# Patient Record
Sex: FEMALE | Race: BLACK OR AFRICAN AMERICAN | Employment: FULL TIME | URBAN - METROPOLITAN AREA
[De-identification: names, ages, dates, MRNs, and addresses within clinical notes are randomized per-mention and may not be internally consistent; named-entity substitution may affect disease eponyms.]

---

## 2017-05-05 ENCOUNTER — TELEPHONE (OUTPATIENT)
Dept: FAMILY MEDICINE CLINIC | Age: 57
End: 2017-05-05

## 2017-05-05 NOTE — TELEPHONE ENCOUNTER
Pt has an appointment with Dr. Sanya Rosas ([phone]) on 5/8/17 at 2:15 pm and wants to know if we can do a referral for her to [address] [place] [address] for menopausal issues.

## 2017-05-05 NOTE — TELEPHONE ENCOUNTER
Pt contacted office regarding referral. Pt states that she needs a referral for gynecology. After reviewing Pt chart I informed her that she hadn't been seen since 2014 and would need an appointment. She still wanted the call routed to Dr. Angel Anand. Pt states that she sees Dr. Sarah Euceda at [facility]. Dr. Angel Anand please advise.

## 2017-05-18 ENCOUNTER — OFFICE VISIT (OUTPATIENT)
Dept: FAMILY MEDICINE CLINIC | Age: 57
End: 2017-05-18

## 2017-05-18 ENCOUNTER — HOSPITAL ENCOUNTER (OUTPATIENT)
Dept: LAB | Age: 57
Discharge: HOME | End: 2017-05-18
Payer: OTHER GOVERNMENT

## 2017-05-18 VITALS
OXYGEN SATURATION: 98 % | RESPIRATION RATE: 18 BRPM | BODY MASS INDEX: 29.08 KG/M2 | HEART RATE: 61 BPM | SYSTOLIC BLOOD PRESSURE: 109 MMHG | WEIGHT: 158 LBS | TEMPERATURE: 97.1 F | HEIGHT: 62 IN | DIASTOLIC BLOOD PRESSURE: 66 MMHG

## 2017-05-18 DIAGNOSIS — G47.00 INSOMNIA, UNSPECIFIED TYPE: ICD-10-CM

## 2017-05-18 DIAGNOSIS — Z98.84 S/P GASTRIC BYPASS: ICD-10-CM

## 2017-05-18 DIAGNOSIS — N95.9 MENOPAUSAL AND PERIMENOPAUSAL DISORDER: Primary | ICD-10-CM

## 2017-05-18 DIAGNOSIS — F43.81 PROLONGED GRIEF REACTION: ICD-10-CM

## 2017-05-18 DIAGNOSIS — I10 UNSPECIFIED ESSENTIAL HYPERTENSION: ICD-10-CM

## 2017-05-18 DIAGNOSIS — E78.5 HYPERLIPIDEMIA, UNSPECIFIED HYPERLIPIDEMIA TYPE: ICD-10-CM

## 2017-05-18 DIAGNOSIS — F32.A DEPRESSION, UNSPECIFIED DEPRESSION TYPE: ICD-10-CM

## 2017-05-18 LAB
ALBUMIN SERPL BCP-MCNC: 4 G/DL (ref 3.4–5)
ALBUMIN/GLOB SERPL: 1.1 {RATIO} (ref 0.8–1.7)
ALP SERPL-CCNC: 72 U/L (ref 45–117)
ALT SERPL-CCNC: 14 U/L (ref 13–56)
ANION GAP BLD CALC-SCNC: 9 MMOL/L (ref 3–18)
AST SERPL W P-5'-P-CCNC: 22 U/L (ref 15–37)
BASOPHILS # BLD AUTO: 0 K/UL (ref 0–0.06)
BASOPHILS # BLD: 0 % (ref 0–2)
BILIRUB SERPL-MCNC: 0.4 MG/DL (ref 0.2–1)
BUN SERPL-MCNC: 15 MG/DL (ref 7–18)
BUN/CREAT SERPL: 23 (ref 12–20)
CALCIUM SERPL-MCNC: 9.7 MG/DL (ref 8.5–10.1)
CHLORIDE SERPL-SCNC: 98 MMOL/L (ref 100–108)
CHOLEST SERPL-MCNC: 253 MG/DL
CO2 SERPL-SCNC: 32 MMOL/L (ref 21–32)
CREAT SERPL-MCNC: 0.65 MG/DL (ref 0.6–1.3)
DIFFERENTIAL METHOD BLD: ABNORMAL
EOSINOPHIL # BLD: 0.1 K/UL (ref 0–0.4)
EOSINOPHIL NFR BLD: 1 % (ref 0–5)
ERYTHROCYTE [DISTWIDTH] IN BLOOD BY AUTOMATED COUNT: 15.2 % (ref 11.6–14.5)
FOLATE SERPL-MCNC: >20 NG/ML (ref 3.1–17.5)
GLOBULIN SER CALC-MCNC: 3.5 G/DL (ref 2–4)
GLUCOSE SERPL-MCNC: 91 MG/DL (ref 74–99)
HCT VFR BLD AUTO: 37.2 % (ref 35–45)
HDLC SERPL-MCNC: 132 MG/DL (ref 40–60)
HDLC SERPL: 1.9 {RATIO} (ref 0–5)
HGB BLD-MCNC: 12 G/DL (ref 12–16)
IRON SATN MFR SERPL: 24 %
IRON SERPL-MCNC: 109 UG/DL (ref 50–175)
LDLC SERPL CALC-MCNC: 106.8 MG/DL (ref 0–100)
LIPID PROFILE,FLP: ABNORMAL
LYMPHOCYTES # BLD AUTO: 47 % (ref 21–52)
LYMPHOCYTES # BLD: 2.1 K/UL (ref 0.9–3.6)
MCH RBC QN AUTO: 29.7 PG (ref 24–34)
MCHC RBC AUTO-ENTMCNC: 32.3 G/DL (ref 31–37)
MCV RBC AUTO: 92.1 FL (ref 74–97)
MONOCYTES # BLD: 0.3 K/UL (ref 0.05–1.2)
MONOCYTES NFR BLD AUTO: 7 % (ref 3–10)
NEUTS SEG # BLD: 2.1 K/UL (ref 1.8–8)
NEUTS SEG NFR BLD AUTO: 45 % (ref 40–73)
PLATELET # BLD AUTO: 272 K/UL (ref 135–420)
PMV BLD AUTO: 11.5 FL (ref 9.2–11.8)
POTASSIUM SERPL-SCNC: 4.3 MMOL/L (ref 3.5–5.5)
PROT SERPL-MCNC: 7.5 G/DL (ref 6.4–8.2)
RBC # BLD AUTO: 4.04 M/UL (ref 4.2–5.3)
SODIUM SERPL-SCNC: 139 MMOL/L (ref 136–145)
TIBC SERPL-MCNC: 458 UG/DL (ref 250–450)
TRIGL SERPL-MCNC: 71 MG/DL (ref ?–150)
TSH SERPL DL<=0.05 MIU/L-ACNC: 1.1 UIU/ML (ref 0.36–3.74)
VIT B12 SERPL-MCNC: >2000 PG/ML (ref 211–911)
VLDLC SERPL CALC-MCNC: 14.2 MG/DL
WBC # BLD AUTO: 4.7 K/UL (ref 4.6–13.2)

## 2017-05-18 PROCEDURE — 83540 ASSAY OF IRON: CPT | Performed by: FAMILY MEDICINE

## 2017-05-18 PROCEDURE — 82607 VITAMIN B-12: CPT | Performed by: FAMILY MEDICINE

## 2017-05-18 PROCEDURE — 80061 LIPID PANEL: CPT | Performed by: FAMILY MEDICINE

## 2017-05-18 PROCEDURE — 85025 COMPLETE CBC W/AUTO DIFF WBC: CPT | Performed by: FAMILY MEDICINE

## 2017-05-18 PROCEDURE — 80053 COMPREHEN METABOLIC PANEL: CPT | Performed by: FAMILY MEDICINE

## 2017-05-18 PROCEDURE — 84443 ASSAY THYROID STIM HORMONE: CPT | Performed by: FAMILY MEDICINE

## 2017-05-18 PROCEDURE — 36415 COLL VENOUS BLD VENIPUNCTURE: CPT | Performed by: FAMILY MEDICINE

## 2017-05-18 PROCEDURE — 84425 ASSAY OF VITAMIN B-1: CPT | Performed by: FAMILY MEDICINE

## 2017-05-18 PROCEDURE — 82306 VITAMIN D 25 HYDROXY: CPT | Performed by: FAMILY MEDICINE

## 2017-05-18 RX ORDER — HYDROCHLOROTHIAZIDE 25 MG/1
25 TABLET ORAL DAILY
COMMUNITY
Start: 2017-03-20 | End: 2017-11-21 | Stop reason: SDUPTHER

## 2017-05-18 RX ORDER — ESOMEPRAZOLE MAGNESIUM 40 MG/1
40 CAPSULE, DELAYED RELEASE ORAL DAILY
COMMUNITY
Start: 2016-03-25 | End: 2018-03-08 | Stop reason: SDUPTHER

## 2017-05-18 RX ORDER — TRAZODONE HYDROCHLORIDE 100 MG/1
100-150 TABLET ORAL
Qty: 45 TAB | Refills: 5 | Status: SHIPPED | OUTPATIENT
Start: 2017-05-18 | End: 2017-08-03 | Stop reason: ALTCHOICE

## 2017-05-18 RX ORDER — ESZOPICLONE 3 MG/1
TABLET, FILM COATED ORAL
COMMUNITY
Start: 2017-04-03 | End: 2017-05-18 | Stop reason: ALTCHOICE

## 2017-05-18 RX ORDER — ALPRAZOLAM 0.5 MG/1
0.5 TABLET ORAL
COMMUNITY
Start: 2017-03-05 | End: 2017-08-03 | Stop reason: SDUPTHER

## 2017-05-18 NOTE — PROGRESS NOTES
Manish Castro 55 y.o. female   Chief Complaint   Patient presents with    Follow-up    Hypertension    Menopause     Premarin 0.3 mg not effective, and causing weight gain.  Insomnia     Lunesta 3 mg not effective, pt previously tried and failed [facility]. 1. Have you been to the ER, urgent care clinic since your last visit? Hospitalized since your last visit? No    2. Have you seen or consulted any other health care providers outside of the [facility] since your last visit? Include any pap smears or colon screening.  No

## 2017-05-18 NOTE — PROGRESS NOTES
HISTORY OF PRESENT ILLNESS  Kanu Laguerre is a 55 y.o. female. Chief Complaint   Patient presents with    Follow-up    Hypertension    Menopause     Premarin 0.3 mg not effective, and causing weight gain.  Insomnia     Lunesta 3 mg not effective, pt previously tried and failed [place]. HPI  Pt last seen 2014. She reports that since that time, her mother  of an MI. She has been dealing with depression. She has not seen anyone or been taking any meds for this but does have panic attacks. Her coworker has been prescribing anxiolytics for her. Pt does not visit her dad as often as she would like because it is difficult to be there since her mom  3 yrs ago. Pt is now more easily able to go visit her mother's grave. Pt also reports that she is in menopause. Her gyn is prescribing premarin but she does not feel that it is helping. She has been seeing Dr Roxanna Douglas at [facility] but would like to see someone different. Pt c/o having no sex drive. This has been an issue in the past that was better addressed by Dr Yuni Zavaleta prior to his [facility]. Pt would like to see Dr Malik Ugarte at [facility]. She has an appt for 2017. Pt thinks that she may have a rectocele. She has had trouble with her bowels. Pt had a colonoscopy last month at [facility]. Since then, she has had a sharp pain with stooling. This occurs whether the stool is hard or soft. Review of Systems   Constitutional: Negative. HENT: Negative. Respiratory: Negative. Cardiovascular: Negative. Psychiatric/Behavioral: Positive for depression. The patient is nervous/anxious and has insomnia. All other systems reviewed and are negative. Physical Exam  Physical Exam   Nursing note and vitals reviewed. Constitutional: She is oriented to person, place, and time. She appears well-developed and well-nourished. HENT:   Head: Normocephalic and atraumatic.    Right Ear: External ear normal.   Left Ear: External ear normal.   Nose: Nose normal.   Eyes: Conjunctivae and EOM are normal.   Neck: Normal range of motion. Neck supple. No JVD present. Carotid bruit is not present. No thyromegaly present. Cardiovascular: Normal rate, regular rhythm, normal heart sounds and intact distal pulses. Exam reveals no gallop and no friction rub. No murmur heard. Pulmonary/Chest: Effort normal and breath sounds normal. She has no wheezes. She has no rhonchi. She has no rales. Abdominal: Soft. Bowel sounds are normal.   Musculoskeletal: Normal range of motion. Neurological: She is alert and oriented to person, place, and time. Coordination normal.   Skin: Skin is warm and dry. Psychiatric: She has a normal mood and affect. Her behavior is normal. Judgment and thought content normal.     ASSESSMENT and Jen Metcalf was seen today for follow-up, hypertension, menopause and insomnia. Diagnoses and all orders for this visit:    Menopausal and perimenopausal disorder  -     REFERRAL TO OBSTETRICS AND GYNECOLOGY    Unspecified essential hypertension  -     METABOLIC PANEL, COMPREHENSIVE; Future  -     CBC WITH AUTOMATED DIFF; Future  -     LIPID PANEL; Future  -     TSH 3RD GENERATION; Future  Stable, cont pres tx plan. Hyperlipidemia, unspecified hyperlipidemia type  -     METABOLIC PANEL, COMPREHENSIVE; Future  -     CBC WITH AUTOMATED DIFF; Future  -     LIPID PANEL; Future  -     TSH 3RD GENERATION; Future    S/P gastric bypass  -     METABOLIC PANEL, COMPREHENSIVE; Future  -     CBC WITH AUTOMATED DIFF; Future  -     LIPID PANEL; Future  -     TSH 3RD GENERATION; Future  -     VITAMIN D, 25 HYDROXY; Future  -     IRON PROFILE; Future  -     VITAMIN B12 & FOLATE;  Future  -     VITAMIN B1, WHOLE BLOOD; Future    Depression, unspecified depression type  -     REFERRAL TO PSYCHOLOGY    Prolonged grief reaction  -     REFERRAL TO PSYCHOLOGY      Follow-up Disposition: 1 month; sooner prn

## 2017-05-18 NOTE — MR AVS SNAPSHOT
Visit Information Date & Time Provider Department Dept. Phone Encounter #  
 5/18/2017 10:15 AM Amador Ruiz MD [address] [id number] Your Appointments 6/15/2017 10:15 AM  
Follow Up with Amador Ruiz MD  
[address] (--) Appt Note: Follow up; Follow up [address] [address] [address] [address] [phone]  
  
   
 [address] [address] [address] [address] Upcoming Health Maintenance Date Due DTaP/Tdap/Td series (1 - Tdap) 10/13/1982 FOBT Q 1 YEAR AGE 50-75 10/13/2011 INFLUENZA AGE 9 TO ADULT 8/1/2017 BREAST CANCER SCRN MAMMOGRAM 11/10/2017 Allergies as of 5/18/2017  Review Complete On: 5/18/2017 By: Amador Ruiz MD  
  
 Severity Noted Reaction Type Reactions Ultram [Tramadol] High 05/10/2010    Other (comments) Can't urinate Current Immunizations  Never Reviewed Name Date H1N1 FLU VACCINE 11/2/2009 Influenza Vaccine 11/19/2013 Not reviewed this visit You Were Diagnosed With   
  
 Codes Comments Menopausal and perimenopausal disorder    -  Primary ICD-10-CM: N95.9 ICD-9-CM: 627.9 Unspecified essential hypertension     ICD-10-CM: I10 
ICD-9-CM: 401.9 Hyperlipidemia, unspecified hyperlipidemia type     ICD-10-CM: E78.5 ICD-9-CM: 272.4 S/P gastric bypass     ICD-10-CM: V17.41 ICD-9-CM: V45.86 Depression, unspecified depression type     ICD-10-CM: F32.9 ICD-9-CM: 490 Prolonged grief reaction     ICD-10-CM: F43.29 ICD-9-CM: 309.1 Insomnia, unspecified type     ICD-10-CM: G47.00 ICD-9-CM: 780.52 Vitals BP Pulse Temp Resp Height(growth percentile) Weight(growth percentile) 109/66 61 97.1 °F (36.2 °C) (Oral) 18 5' 2\" (1.575 m) 158 lb (71.7 kg) LMP SpO2 BMI OB Status Smoking Status 12/31/1996 98% 28.9 kg/m2 Hysterectomy Former Smoker Vitals History BMI and BSA Data Body Mass Index Body Surface Area 28.9 kg/m 2 1.77 m 2 Preferred Pharmacy Pharmacy Name Phone [facility] [address] [phone] Your Updated Medication List  
  
   
This list is accurate as of: 5/18/17  4:30 PM.  Always use your most recent med list.  
  
  
  
  
 albuterol 90 mcg/actuation inhaler Commonly known as:  PROVENTIL HFA, VENTOLIN HFA, PROAIR HFA Take 2 Puffs by inhalation every four (4) hours as needed for Wheezing. ALPRAZolam 0.5 mg tablet Commonly known as:  XANAX  
  
 amLODIPine 10 mg tablet Commonly known as:  Sharion [place] Take 1 Tab by mouth daily. biotin 500 mcg Cap Take 500 mcg by mouth daily. calcium carbonate 600 mg calcium (1,500 mg) tablet Commonly known as:  Donzell November Take 600 mg by mouth three (3) times daily. CALCIUM PHOSPHATE-VITAMIN D3 PO Take 1 Tab by Mouth Once a Day. cetirizine 10 mg tablet Commonly known as:  ZYRTEC Take 1 Tab by mouth nightly. [facility] Take  by mouth two (2) times a day. diclofenac 1 % Gel Commonly known as:  VOLTAREN Apply 4 g to affected area four (4) times daily. esomeprazole 40 mg capsule Commonly known as:  NEXIUM  
40 mg.  
  
 fluticasone 50 mcg/actuation nasal spray Commonly known as:  Kandi Mad 2 Sprays by Both Nostrils route daily. hydroCHLOROthiazide 25 mg tablet Commonly known as:  HYDRODIURIL LORazepam 0.5 mg tablet Commonly known as:  ATIVAN Take 0.5-1 tabs by mouth twice daily as needed. omeprazole 10 mg capsule Commonly known as:  PRILOSEC  
TAKE 1 CAPSULE BY MOUTH 2 TIMES A DAY  
  
 ondansetron 4 mg disintegrating tablet Commonly known as:  ZOFRAN ODT Take 4 mg by mouth every eight (8) hours as needed. PREMARIN 0.3 mg tablet Generic drug:  conjugated estrogens Take  by mouth. telmisartan 80 mg tablet Commonly known as:  Jordis Grass Take 1 Tab by mouth daily. traZODone 100 mg tablet Commonly known as:  Redge Dahlgren Take 1-1.5 Tabs by mouth nightly. triamcinolone acetonide 0.1 % topical cream  
Commonly known as:  KENALOG Apply  to affected area two (2) times daily as needed for Skin Irritation. use thin layer Vitamin B Complex-Vit B12 1,200 mcg/mL Drop  
by SubLINGual route daily. zolpidem 10 mg tablet Commonly known as:  AMBIEN  
TAKE ONE TABLET BY MOUTH EVERY NIGHT AT BEDTIME Prescriptions Sent to Pharmacy Refills  
 traZODone (DESYREL) 100 mg tablet 5 Sig: Take 1-1.5 Tabs by mouth nightly. Class: Normal  
 Pharmacy: [id number] [address] [place] [address],[address], [address] Ph #: [phone] Route: Oral  
  
We Performed the Following REFERRAL TO OBSTETRICS AND GYNECOLOGY [REF51 Custom] REFERRAL TO PSYCHOLOGY [NJS54 Custom] Referral Information Referral ID Referred By Referred To  
  
 [id number] Lady KIM MD Joaquin Suarez [address] [place] [address] Phone: [phone] Fax: [phone] Visits Status Start Date End Date  
 20 New Request 5/18/17 5/18/18 If your referral has a status of pending review or denied, additional information will be sent to support the outcome of this decision. Referral ID Referred By Referred To  
 [id number] David Hamm Not Available Visits Status Start Date End Date 1 New Request 5/18/17 5/18/18 If your referral has a status of pending review or denied, additional information will be sent to support the outcome of this decision. Patient Instructions Please contact our office if you have any questions about your visit today. Introducing [facility]! Margarita Webb introduces [email or web address] patient portal. Now you can access parts of your medical record, email your doctor's office, and request medication refills online.    
 
1. In your internet browser, go to [email or web address] ([email or web address] 2. Click on the First Time User? Click Here link in the Sign In box. You will see the New Member Sign Up page. 3. Enter your [employer] Access Code exactly as it appears below. You will not need to use this code after youve completed the sign-up process. If you do not sign up before the expiration date, you must request a new code. · [employer] Access Code: [email or web address] Expires: 8/16/2017 10:09 AM 
 
4. Enter the last four digits of your Social Security Number (xxxx) and Date of Birth (mm/dd/yyyy) as indicated and click Submit. You will be taken to the next sign-up page. 5. Create a [employer] ID. This will be your [employer] login ID and cannot be changed, so think of one that is secure and easy to remember. 6. Create a [employer] password. You can change your password at any time. 7. Enter your Password Reset Question and Answer. This can be used at a later time if you forget your password. 8. Enter your e-mail address. You will receive e-mail notification when new information is available in [address] 9. Click Sign Up. You can now view and download portions of your medical record. 10. Click the Download Summary menu link to download a portable copy of your medical information. If you have questions, please visit the Frequently Asked Questions section of the [employer] website. Remember, [employer] is NOT to be used for urgent needs. For medical emergencies, dial 911. Now available from your iPhone and Android! Please provide this summary of care documentation to your next provider. Your primary care clinician is listed as Geni Ivory. If you have any questions after today's visit, please call [phone].

## 2017-05-19 ENCOUNTER — TELEPHONE (OUTPATIENT)
Dept: FAMILY MEDICINE CLINIC | Age: 57
End: 2017-05-19

## 2017-05-19 LAB — 25(OH)D3 SERPL-MCNC: 26.9 NG/ML (ref 30–100)

## 2017-05-19 NOTE — TELEPHONE ENCOUNTER
Pt took Trazodone yesterday to help her sleep. She got no sleep last night and her mind still keep going on and she felt groggy throughout the day. She is not going to take this. She will continue with her Kodi Douglas and will discuss other options at her next appointment.

## 2017-05-20 LAB — VIT B1 BLD-SCNC: 87 NMOL/L (ref 66.5–200)

## 2017-05-31 ENCOUNTER — TELEPHONE (OUTPATIENT)
Dept: FAMILY MEDICINE CLINIC | Age: 57
End: 2017-05-31

## 2017-05-31 DIAGNOSIS — F32.A DEPRESSION, UNSPECIFIED DEPRESSION TYPE: Primary | ICD-10-CM

## 2017-05-31 DIAGNOSIS — F43.81 PROLONGED GRIEF REACTION: ICD-10-CM

## 2017-05-31 RX ORDER — ESCITALOPRAM OXALATE 10 MG/1
10 TABLET ORAL DAILY
Qty: 30 TAB | Refills: 5 | Status: SHIPPED | OUTPATIENT
Start: 2017-05-31 | End: 2017-08-03 | Stop reason: ALTCHOICE

## 2017-05-31 NOTE — TELEPHONE ENCOUNTER
Pt saw her psychiatrist, Dr. Ed Ayala today and she recommended that Dr. Alexa Virgen put the patient on Lexapro. She will be faxing over a report from her visit. Pt would like this to go to [facility] if it will be a long term medication. If not she uses [address]

## 2017-06-26 ENCOUNTER — TELEPHONE (OUTPATIENT)
Dept: FAMILY MEDICINE CLINIC | Age: 57
End: 2017-06-26

## 2017-06-26 NOTE — TELEPHONE ENCOUNTER
Pt called and is requesting a refill on Eszopiclone (Lunesta) 3 mg tablet. She would [facility].

## 2017-07-10 DIAGNOSIS — F32.A DEPRESSION, UNSPECIFIED DEPRESSION TYPE: ICD-10-CM

## 2017-07-10 DIAGNOSIS — F43.81 PROLONGED GRIEF REACTION: ICD-10-CM

## 2017-07-10 RX ORDER — ESCITALOPRAM OXALATE 10 MG/1
10 TABLET ORAL DAILY
Qty: 90 TAB | Refills: 3 | OUTPATIENT
Start: 2017-07-10

## 2017-07-13 NOTE — TELEPHONE ENCOUNTER
Pt is due for a follow up appt with DR. Rodriguez. Unable to reach the patient on several different attempts.

## 2017-07-26 ENCOUNTER — TELEPHONE (OUTPATIENT)
Dept: FAMILY MEDICINE CLINIC | Age: 57
End: 2017-07-26

## 2017-08-03 ENCOUNTER — OFFICE VISIT (OUTPATIENT)
Dept: FAMILY MEDICINE CLINIC | Age: 57
End: 2017-08-03

## 2017-08-03 ENCOUNTER — HOSPITAL ENCOUNTER (OUTPATIENT)
Dept: LAB | Age: 57
Discharge: HOME | End: 2017-08-03
Payer: OTHER GOVERNMENT

## 2017-08-03 VITALS
HEART RATE: 75 BPM | TEMPERATURE: 98 F | BODY MASS INDEX: 29.35 KG/M2 | DIASTOLIC BLOOD PRESSURE: 78 MMHG | WEIGHT: 159.5 LBS | SYSTOLIC BLOOD PRESSURE: 125 MMHG | HEIGHT: 62 IN | OXYGEN SATURATION: 99 %

## 2017-08-03 DIAGNOSIS — R06.2 WHEEZING: ICD-10-CM

## 2017-08-03 DIAGNOSIS — Z98.84 S/P GASTRIC BYPASS: ICD-10-CM

## 2017-08-03 DIAGNOSIS — Z51.81 MEDICATION MONITORING ENCOUNTER: ICD-10-CM

## 2017-08-03 DIAGNOSIS — I10 UNSPECIFIED ESSENTIAL HYPERTENSION: Primary | ICD-10-CM

## 2017-08-03 DIAGNOSIS — F41.9 ANXIETY: ICD-10-CM

## 2017-08-03 DIAGNOSIS — E78.5 HYPERLIPIDEMIA, UNSPECIFIED HYPERLIPIDEMIA TYPE: ICD-10-CM

## 2017-08-03 DIAGNOSIS — N95.1 HOT FLASHES DUE TO MENOPAUSE: ICD-10-CM

## 2017-08-03 PROCEDURE — 80307 DRUG TEST PRSMV CHEM ANLYZR: CPT | Performed by: FAMILY MEDICINE

## 2017-08-03 RX ORDER — ESZOPICLONE 2 MG/1
2 TABLET, FILM COATED ORAL
COMMUNITY
End: 2017-09-14 | Stop reason: SDUPTHER

## 2017-08-03 RX ORDER — ALBUTEROL SULFATE 90 UG/1
2 AEROSOL, METERED RESPIRATORY (INHALATION)
Qty: 3 INHALER | Refills: 3 | Status: SHIPPED | COMMUNITY
Start: 2017-08-03 | End: 2019-12-13 | Stop reason: ALTCHOICE

## 2017-08-03 RX ORDER — VENLAFAXINE HYDROCHLORIDE 37.5 MG/1
CAPSULE, EXTENDED RELEASE ORAL
Qty: 166 CAP | Refills: 0 | Status: SHIPPED | OUTPATIENT
Start: 2017-08-03 | End: 2017-11-13 | Stop reason: SDUPTHER

## 2017-08-03 RX ORDER — ALPRAZOLAM 0.5 MG/1
0.5 TABLET ORAL
Qty: 90 TAB | Refills: 0 | Status: SHIPPED | OUTPATIENT
Start: 2017-08-03 | End: 2018-01-12 | Stop reason: SDUPTHER

## 2017-08-03 NOTE — MR AVS SNAPSHOT
Visit Information Date & Time Provider Department Dept. Phone Encounter #  
 8/3/2017 10:30 AM Keyana West MD [address] [id number] Your Appointments 8/11/2017 11:30 AM  
PHYSICAL with MD Madisyn Delgado [address] (--) Appt Note: [address] [address]  
  
    
 9/21/2017 10:30 AM  
Follow Up with MD Madisyn Dlegado [address] (--) Appt Note: [address] [facility] [address] [phone]  
  
   
 [address] [facility] [address] Upcoming Health Maintenance Date Due DTaP/Tdap/Td series (1 - Tdap) 10/13/1982 INFLUENZA AGE 9 TO ADULT 8/1/2017 BREAST CANCER SCRN MAMMOGRAM 11/10/2017 COLONOSCOPY 1/12/2027 Allergies as of 8/3/2017  Review Complete On: 8/3/2017 By: Keyana West MD  
  
 Severity Noted Reaction Type Reactions Tioconazole  03/16/2016    Itching Current Immunizations  Never Reviewed Name Date H1N1 FLU VACCINE 11/2/2009 Influenza Vaccine 11/19/2013 Not reviewed this visit You Were Diagnosed With   
  
 Codes Comments Unspecified essential hypertension    -  Primary ICD-10-CM: I10 
ICD-9-CM: 401.9 Hyperlipidemia, unspecified hyperlipidemia type     ICD-10-CM: E78.5 ICD-9-CM: 272.4 S/P gastric bypass     ICD-10-CM: M07.59 ICD-9-CM: V45.86 Wheezing     ICD-10-CM: R06.2 ICD-9-CM: 786.07 Anxiety     ICD-10-CM: F41.9 ICD-9-CM: 300.00 Hot flashes due to menopause     ICD-10-CM: N95.1 ICD-9-CM: 627.2 Medication monitoring encounter     ICD-10-CM: Z51.81 
ICD-9-CM: V58.83 Vitals BP Pulse Temp Height(growth percentile) Weight(growth percentile) LMP  
 125/78 75 98 °F (36.7 °C) (Oral) 5' 2\" (1.575 m) 159 lb 8 oz (72.3 kg) 12/31/1996 SpO2 BMI OB Status Smoking Status 99% 29.17 kg/m2 Hysterectomy Former Smoker BMI and BSA Data Body Mass Index Body Surface Area  
 29.17 kg/m 2 1.78 m 2 Preferred Pharmacy Pharmacy Name Phone [address] [facility] [phone] Your Updated Medication List  
  
   
This list is accurate as of: 8/3/17 11:58 AM.  Always use your most recent med list.  
  
  
  
  
 albuterol 90 mcg/actuation inhaler Commonly known as:  PROVENTIL HFA, VENTOLIN HFA, PROAIR HFA Take 2 Puffs by inhalation every four (4) hours as needed for Wheezing. ALPRAZolam 0.5 mg tablet Commonly known as:  Nancy Hopes Take 1 Tab by mouth daily as needed. amLODIPine 10 mg tablet Commonly known as:  Td Ramires Take 1 Tab by mouth daily. biotin 500 mcg Cap Take 500 mcg by mouth daily. calcium carbonate 600 mg calcium (1,500 mg) tablet Commonly known as:  Onluis enrique Deniselbach Take 600 mg by mouth daily. CALCIUM PHOSPHATE-VITAMIN D3 PO Take 1 Tab by Mouth Once a Day. cetirizine 10 mg tablet Commonly known as:  ZYRTEC Take 1 Tab by mouth nightly. [facility] [facility] Take  by mouth two (2) times a day. diclofenac 1 % Gel Commonly known as:  VOLTAREN Apply 4 g to affected area four (4) times daily. esomeprazole 40 mg capsule Commonly known as:  Harlon Sleeper Take 40 mg by mouth daily. fluticasone 50 mcg/actuation nasal spray Commonly known as:  Cyndra Puna 2 Sprays by Both Nostrils route daily. hydroCHLOROthiazide 25 mg tablet Commonly known as:  HYDRODIURIL Take 25 mg by mouth daily. LUNESTA 2 mg tablet Generic drug:  eszopiclone Take 2 mg by mouth nightly. omeprazole 10 mg capsule Commonly known as:  PRILOSEC  
TAKE 1 CAPSULE BY MOUTH 2 TIMES A DAY  
  
 ondansetron 4 mg disintegrating tablet Commonly known as:  ZOFRAN ODT Take 4 mg by mouth every eight (8) hours as needed. PREMARIN 0.45 mg tablet Generic drug:  estrogens (conjugated) Take 1 Tab by mouth daily. telmisartan 80 mg tablet Commonly known as:  Patricia Ignacio Take 1 Tab by mouth daily. triamcinolone acetonide 0.1 % topical cream  
Commonly known as:  KENALOG Apply  to affected area two (2) times daily as needed for Skin Irritation. use thin layer  
  
 venlafaxine-SR 37.5 mg capsule Commonly known as:  EFFEXOR-XR Take 1 pill daily x 2 wks, then increase to 2 pills daily. Vitamin B Complex-Vit B12 1,200 mcg/mL Drop  
by SubLINGual route daily. zolpidem 10 mg tablet Commonly known as:  AMBIEN  
TAKE ONE TABLET BY MOUTH EVERY NIGHT AT BEDTIME Prescriptions Printed Refills ALPRAZolam (XANAX) 0.5 mg tablet 0 Sig: Take 1 Tab by mouth daily as needed. Class: Print Route: Oral  
  
Prescriptions Sent to Mail Order Refills  
 albuterol (PROVENTIL HFA, VENTOLIN HFA, PROAIR HFA) 90 mcg/actuation inhaler 3 Sig: Take 2 Puffs by inhalation every four (4) hours as needed for Wheezing. Class: Mail Order Pharmacy: [address], [address] Ph #: [phone] Route: Inhalation Prescriptions Sent to Pharmacy Refills  
 venlafaxine-SR (EFFEXOR-XR) 37.5 mg capsule 0 Sig: Take 1 pill daily x 2 wks, then increase to 2 pills daily. Class: Normal  
 Pharmacy: [address], [address] Ph #: [phone] We Performed the Following REFERRAL TO PULMONARY DISEASE [QFZ32 Custom] Referral Information Referral ID Referred By Referred To  
  
 [id number] Claudia KIM MD   
   [address] [place] [address] [address] [facility] [place], [address] [facility] [address] Phone: [phone] Fax: [phone] Visits Status Start Date End Date 1 New Request 8/3/17 8/3/18 If your referral has a status of pending review or denied, additional information will be sent to support the outcome of this decision. Patient Instructions Please contact our office if you have any questions about your visit today. Introducing [facility]! Mic Mayo introduces [employer] patient portal. Now you can access parts of your medical record, email your doctor's office, and request medication refills online. 1. In your internet browser, go to [email or web address] 2. Click on the First Time User? Click Here link in the Sign In box. You will see the New Member Sign Up page. 3. Enter your [employer] Access Code exactly as it appears below. You will not need to use this code after youve completed the sign-up process. If you do not sign up before the expiration date, you must request a new code. · [employer] Access Code: [email or web address] Expires: 8/16/2017 10:09 AM 
 
4. Enter the last four digits of your Social Security Number (xxxx) and Date of Birth (mm/dd/yyyy) as indicated and click Submit. You will be taken to the next sign-up page. 5. Create a [employer] ID. This will be your [employer] login ID and cannot be changed, so think of one that is secure and easy to remember. 6. Create a [employer] password. You can change your password at any time. 7. Enter your Password Reset Question and Answer. This can be used at a later time if you forget your password. 8. Enter your e-mail address. You will receive e-mail notification when new information is available in [address] 9. Click Sign Up. You can now view and download portions of your medical record. 10. Click the Download Summary menu link to download a portable copy of your medical information. If you have questions, please visit the Frequently Asked Questions section of the [employer] website. Remember, [employer] is NOT to be used for urgent needs. For medical emergencies, dial 911. Now available from your iPhone and Android! Please provide this summary of care documentation to your next provider. Your primary care clinician is listed as Christie American. If you have any questions after today's visit, please call [phone].

## 2017-08-03 NOTE — PROGRESS NOTES
Chief Complaint   Patient presents with    Hypertension    Depression     stopped Lexapro, makes her filled tired    Insomnia     stopped trazadone, not effective    Cholesterol Problem    Constipation       Health Maintenance Due   Topic Date Due    DTaP/Tdap/Td series (1 - Tdap) 10/13/1982    FOBT Q 1 YEAR AGE 50-75  10/13/2011    INFLUENZA AGE 9 TO ADULT  08/01/2017    BREAST CANCER SCRN MAMMOGRAM  11/10/2017

## 2017-08-03 NOTE — LETTER
Name:Sebastian Fonseca :1960 MR #:[id number] Provider Name:Nikki Briant Angelucci, MD  
*MKVV-042* BSMG-491 () Page 1 of 5 Initial [facility] CONTROLLED SUBSTANCE AGREEMENT I may be prescribed medications that are controlled substances as part  of my treatment plan for management of my medical condition(s). The goal of my treatment plan is to maintain and/or improve my health and wellbeing. Because controlled substances have an increased risk of abuse or harm, continual re-evaluation is needed determine if the goals of my treatment plan are being met for my safety and the safety of others. Katerin Braswell  am entering into this Controlled Substance Agreement with my provider, Nicho Miller MD at [place] [address] . I understand that successful treatment requires mutual trust and honesty between me and my provider. I understand that there are state and federal laws and regulations which apply to the medications that my provider may prescribe that must be followed. I understand there are risks and benefits ts of taking the medicines that my provider may prescribe. I understand and agree that following this Agreement is necessary in continuing my provider-patient relationship and success of my treatment plan. As a part of my treatment plan, I agree to the following: COMMUNICATION: 
 
1. I will communicate fully with my provider about my medical condition(s), including the effect on my daily life and how well my medications are helping. I will tell my provider all of the medications that I take for any reason, including medications I receive from another health care provider, and will notify my provider about all issues, problems or concerns, including any side effects, which may be related to my medications. I understand that this information allows my provider to adjust my treatment plan to help manage my medical condition.  I understand that this information will become part of my permanent medical record. 2. I will notify my provider if I have a history of alcohol/drug misuse/addiction or if I have had treatment for alcohol/drug addiction in the past, or if I have a new problem with or concern about alcohol/drug use/addiction, because this increases the likelihood of high risk behaviors and may lead to serious medical conditions. 3. Females Only: I will notify my provider if I am or become pregnant, or if I intend to become pregnant, or if I intend to breastfeed. I understand that communication of these issues with my provider is important, due to possible effects my medication could have on an unborn fetus or breastfeeding child. Name:Sebastian Elizondo :1960 MR #:[id number] Provider Name:Batool Hall MD  
*CUBP-972* BSMG-491 () Page 2 of 5 Initial [facility] MISUSE OF MEDICATIONS / DRUGS: 
 
1. I agree to take all controlled substances as prescribed, and will not misuse or abuse any controlled substances prescribed by my provider. For my safety, I will not increase the amount of medicine I take without first talking with and getting permission from my provider. 2. If I have a medical emergency, another health care provider may prescribe me medication. If I seek emergency treatment, I will notify my provider within seventy-two (72) hours. 3. I understand that my provider may discuss my use and/or possible misuse/abuse of controlled substances and alcohol, as appropriate, with any health care provider involved in my care, pharmacist or legal authority. ILLEGAL DRUGS: 
 
1. I will not use illegal drugs of any kind, including but not limited to marijuana, heroin, cocaine, or any prescription drug which is not prescribed to me. DRUG DIVERSION / PRESCRIPTION FRAUD: 
 
1. I will not share, sell, trade, give away, or otherwise misuse my prescriptions or medications. 2. I will not alter any prescriptions provided to me by my provider. SINGLE PROVIDER: 
 
1. I agree that all controlled substances that I take will be prescribed only by my provider (or his/her covering provider) under this Agreement. This agreement does not prevent me from seeking emergency medical treatment or receiving pain management related to a surgery. PROTECTING MEDICATIONS: 
 
1. I am responsible for keeping my prescriptions and medications in a safe and secure place including safeguarding them from loss or theft. I understand that lost, stolen or damaged/destroyed prescriptions or medications will not be replaced. Name:.Anahi Figueroa :1960 MR #:[id number] Provider Name:Batool Mendieta MD  
*KXJF-731* BSMG-491 () Page 3 of 5 Initial [facility] PRESCRIPTION RENEWALS/REFILLS: 
 
1. I will follow my controlled substance medication schedule as prescribed by my provider. 2. I understand and agree that I will make any requests for renewals or refills of my prescriptions only at the time of an office visit or during my providers regular office hours subject to the prescription refill requirements of the individual practice. 3. I understand that my provider may not call in prescriptions for controlled substances to my pharmacy. 4. I understand that my provider may adjust or discontinue these medications as deemed appropriate for my medical treatment plan. This Agreement does not guarantee the prescription of controlled medications. 5. I agree that if my medications are adjusted or discontinued, I will properly dispose of any remaining medications. I understand that I will be required to dispose of any remaining controlled medications prior to being provided with any prescriptions for other controlled medications.  
 
 
1. I authorize my provider and my pharmacy to cooperate fully with any local, state, or federal law enforcement agency in the investigation of any possible misuse, sale, or other diversion of my controlled substance prescriptions or medications. RISKS: 
 
 
1. I understand that if I do not adhere to this Agreement in any way, my provider may change my prescriptions, stop prescribing controlled substances or end our provider-patient relationship. 2. If my provider decides to stop prescribing medication, or decides to end our provider-patient relationship,my provider may require that I taper my medications slowly. If necessary, my provider may also provide a prescription for other medications to treat my withdrawal symptoms. UNDERSTANDING THIS AGREEMENT: 
 
I understand that my provider may adjust or stop my prescriptions for controlled substances based on my medical condition and my treatment plan. I understand that this Agreement does not guarantee that I will be prescribed medications or controlled substances. I understand that controlled substances may be just one part 
of my treatment plan. My initial on each page and my signature below shows that I have read each page of this Agreement, I have had an opportunity to ask questions, and all of my questions have been answered to my satisfaction by my provider. By signing below, I agree to comply with this Agreement, and I understand that if I do not follow the Agreements listed above, my provider may stop 
 
 
 
_________________________________________  Date/Time 8/3/2017 11:44 AM   
             (Patient Signature)

## 2017-08-03 NOTE — PROGRESS NOTES
HISTORY OF PRESENT ILLNESS  Jennifer Lezama is a 55 y.o. female. HPI  Pt is here for follow up of htn, depression/anxiety. Pt had labs on 5/18/17. Labs reviewed in detail with pt. Pt does need medication refills today. New concerns today: pt saw Dr Juliana Art (therapist) with improvement of her feelings regarding her mother's death. This was helpful. Pt notes that her brother was not receptive to her efforts to reconcile their relationship. Pt was advised to try melatonin but it gave her a ha. She is taking xanax daily, usually before going to work. She normally just takes 1/2 tab. Pt is hesitant to try an ssri. She has been on lexapro and wellbutrin in the past.      Pt reports that trazodone puts her to sleep but she feels like she is still awake. Pt feels that she sleeps more soundly with lunesta. Pt has seen gyn for her hot flashes. Her HRT was increased. It is working well for hot flashes. Pt requests refill of albuterol. She feels that she is wheezing at night if she has been outside. Review of Systems   Constitutional: Negative. HENT: Negative. Respiratory: Negative. Cardiovascular: Negative. Psychiatric/Behavioral: Positive for depression. The patient has insomnia. All other systems reviewed and are negative. Physical Exam  Physical Exam   Nursing note and vitals reviewed. Constitutional: She is oriented to person, place, and time. She appears well-developed and well-nourished. HENT:   Head: Normocephalic and atraumatic. Right Ear: External ear normal.   Left Ear: External ear normal.   Nose: Nose normal.   Eyes: Conjunctivae and EOM are normal.   Neck: Normal range of motion. Neck supple. No JVD present. Carotid bruit is not present. No thyromegaly present. Cardiovascular: Normal rate, regular rhythm, normal heart sounds and intact distal pulses. Exam reveals no gallop and no friction rub. No murmur heard.   Pulmonary/Chest: Effort normal and breath sounds normal. She has no wheezes. She has no rhonchi. She has no rales. Abdominal: Soft. Bowel sounds are normal.   Musculoskeletal: Normal range of motion. Neurological: She is alert and oriented to person, place, and time. Coordination normal.   Skin: Skin is warm and dry. Psychiatric: She has a normal mood and affect. Her behavior is normal. Judgment and thought content normal.     ASSESSMENT and PLAN  Diagnoses and all orders for this visit:    1. Unspecified essential hypertension  Stable, cont pres tx plan. 2. Hyperlipidemia, unspecified hyperlipidemia type  Stable, cont pres tx plan. 3. S/P gastric bypass  Stable, cont pres tx plan. 4. Wheezing  -     albuterol (PROVENTIL HFA, VENTOLIN HFA, PROAIR HFA) 90 mcg/actuation inhaler; Take 2 Puffs by inhalation every four (4) hours as needed for Wheezing.  -     REFERRAL TO PULMONARY DISEASE    5. Anxiety  -     ALPRAZolam (XANAX) 0.5 mg tablet; Take 1 Tab by mouth daily as needed. -     venlafaxine-SR (EFFEXOR-XR) 37.5 mg capsule; Take 1 pill daily x 2 wks, then increase to 2 pills daily. (Patient taking differently: Take 37.5 mg by mouth daily. Take 1 pill daily x 2 wks, then increase to 2 pills daily. )  -     COMPLIANCE DRUG SCREEN/PRESCRIPTION MONITORING; Future    6. Hot flashes due to menopause  -     venlafaxine-SR (EFFEXOR-XR) 37.5 mg capsule; Take 1 pill daily x 2 wks, then increase to 2 pills daily. (Patient taking differently: Take 37.5 mg by mouth daily. Take 1 pill daily x 2 wks, then increase to 2 pills daily.)    7. Medication monitoring encounter  -     COMPLIANCE DRUG SCREEN/PRESCRIPTION MONITORING; Future      Follow-up Disposition: 1 month; sooner prn  Over 40 min spent with pt. Greater than 50% of this time spent in counseling. Pt had many concerns today; all issues discussed in detail.

## 2017-08-11 LAB
DRUGS UR: NORMAL
PDF, 451356: NORMAL

## 2017-08-30 ENCOUNTER — OFFICE VISIT (OUTPATIENT)
Dept: FAMILY MEDICINE CLINIC | Age: 57
End: 2017-08-30

## 2017-08-30 VITALS
WEIGHT: 155 LBS | TEMPERATURE: 97.4 F | RESPIRATION RATE: 18 BRPM | BODY MASS INDEX: 28.52 KG/M2 | OXYGEN SATURATION: 99 % | HEIGHT: 62 IN | DIASTOLIC BLOOD PRESSURE: 73 MMHG | HEART RATE: 74 BPM | SYSTOLIC BLOOD PRESSURE: 123 MMHG

## 2017-08-30 DIAGNOSIS — J01.00 ACUTE NON-RECURRENT MAXILLARY SINUSITIS: Primary | ICD-10-CM

## 2017-08-30 DIAGNOSIS — Z98.84 S/P GASTRIC BYPASS: ICD-10-CM

## 2017-08-30 DIAGNOSIS — L30.9 DERMATITIS: ICD-10-CM

## 2017-08-30 DIAGNOSIS — R10.13 EPIGASTRIC ABDOMINAL PAIN: ICD-10-CM

## 2017-08-30 RX ORDER — AMOXICILLIN AND CLAVULANATE POTASSIUM 875; 125 MG/1; MG/1
1 TABLET, FILM COATED ORAL 2 TIMES DAILY
Qty: 20 TAB | Refills: 0 | Status: SHIPPED | OUTPATIENT
Start: 2017-08-30 | End: 2017-09-09

## 2017-08-30 NOTE — PROGRESS NOTES
HISTORY OF PRESENT ILLNESS  Bartolo Sexton Hulon Gitelman is a 55 y.o. female. Chief Complaint   Patient presents with    Ear Pain     Left ear x 1 week     Sinus Pain     with pressure x 1 week       HPI  Pt here for eval. She c/o b maxillary sinus pressure as well as left ear pain x 1 wk. She thinks she has a sinus infection. She has used a neti pot as well as zicam.  It has not improved. She has also tried flonase. Pt c/o rash on her buttocks. It is itchy at times. Pt is concerned about a hernia. She feels that she is burping frequently, hears gastric noises, and has a feeling of fullness. She has improvement with mylanta. She takes nexium periodically. She stopped about 1-2 months ago. Review of Systems   Constitutional: Negative. HENT: Positive for congestion and ear pain. Gastrointestinal: Positive for abdominal pain. Skin: Positive for itching and rash. All other systems reviewed and are negative. Physical Exam  Physical Exam   Nursing note and vitals reviewed. Constitutional: She is oriented to person, place, and time. She appears well-developed and well-nourished. HENT:   Head: Normocephalic and atraumatic. Right Ear: External ear normal.   Left Ear: External ear normal.   Nose: Nose normal.   Eyes: Conjunctivae and EOM are normal.   Neck: Normal range of motion. Neck supple. No JVD present. Carotid bruit is not present. No thyromegaly present. Cardiovascular: Normal rate, regular rhythm, normal heart sounds and intact distal pulses. Exam reveals no gallop and no friction rub. No murmur heard. Pulmonary/Chest: Effort normal and breath sounds normal. She has no wheezes. She has no rhonchi. She has no rales. Abdominal: Soft. Bowel sounds are normal.   Musculoskeletal: Normal range of motion. Neurological: She is alert and oriented to person, place, and time. Coordination normal.   Skin: Skin is warm and dry.    Hyperpigmented lesions on buttocks and in gluteal cleft.  Psychiatric: She has a normal mood and affect. Her behavior is normal. Judgment and thought content normal.     ASSESSMENT and PLAN  Diagnoses and all orders for this visit:    1. Acute non-recurrent maxillary sinusitis  -     amoxicillin-clavulanate (AUGMENTIN) 875-125 mg per tablet; Take 1 Tab by mouth two (2) times a day for 10 days. 2. S/P gastric bypass  -     REFERRAL TO BARIATRIC SURGERY    3. Dermatitis  Recommend trial of antifungal cream bid x 2 wks; refer to derm if not improved after 2 wks.      4. Epigastric abdominal pain  -     REFERRAL TO BARIATRIC SURGERY      Follow-up Disposition: 3 months; sooner prn

## 2017-08-30 NOTE — PROGRESS NOTES
Dorothea Martinez 55 y.o. female   Chief Complaint   Patient presents with    Ear Pain     Left ear x 1 week     Sinus Pain     with pressure x 1 week         1. Have you been to the ER, urgent care clinic since your last visit? Hospitalized since your last visit? No    2. Have you seen or consulted any other health care providers outside of the [address] [place] [address] since your last visit? Include any pap smears or colon screening.  No

## 2017-09-15 RX ORDER — ESZOPICLONE 2 MG/1
2 TABLET, FILM COATED ORAL
Qty: 30 TAB | Refills: 0 | Status: SHIPPED | OUTPATIENT
Start: 2017-09-15 | End: 2017-09-26 | Stop reason: SDUPTHER

## 2017-09-25 ENCOUNTER — TELEPHONE (OUTPATIENT)
Dept: FAMILY MEDICINE CLINIC | Age: 57
End: 2017-09-25

## 2017-09-26 RX ORDER — ESZOPICLONE 2 MG/1
2 TABLET, FILM COATED ORAL
Qty: 90 TAB | Refills: 0 | Status: SHIPPED | OUTPATIENT
Start: 2017-09-26 | End: 2017-12-29 | Stop reason: SDUPTHER

## 2017-10-14 DIAGNOSIS — F41.9 ANXIETY: ICD-10-CM

## 2017-10-14 DIAGNOSIS — N95.1 HOT FLASHES DUE TO MENOPAUSE: ICD-10-CM

## 2017-11-13 DIAGNOSIS — L30.9 DERMATITIS: Primary | ICD-10-CM

## 2017-11-13 DIAGNOSIS — F41.9 ANXIETY: ICD-10-CM

## 2017-11-13 DIAGNOSIS — N95.1 HOT FLASHES DUE TO MENOPAUSE: ICD-10-CM

## 2017-11-13 DIAGNOSIS — Z12.31 ENCOUNTER FOR SCREENING MAMMOGRAM FOR MALIGNANT NEOPLASM OF BREAST: ICD-10-CM

## 2017-11-13 NOTE — TELEPHONE ENCOUNTER
Requested Prescriptions     Pending Prescriptions Disp Refills    venlafaxine-SR (EFFEXOR-XR) 37.5 mg capsule 166 Cap 0     Sig: Take 1 pill daily x 2 wks, then increase to 2 pills daily. Pt stated that she would like 30 day sent to pharmacy. Pt is also concerned if she should still be taking 2 pills with her dosage. Please advise. Patient also stated that she would need a referral for Mammogram as well as Dermatology due to rash. Please advise.

## 2017-11-16 RX ORDER — VENLAFAXINE HYDROCHLORIDE 37.5 MG/1
75 CAPSULE, EXTENDED RELEASE ORAL DAILY
Qty: 60 CAP | Refills: 0 | Status: SHIPPED | OUTPATIENT
Start: 2017-11-16 | End: 2017-11-21 | Stop reason: SDUPTHER

## 2017-11-21 ENCOUNTER — OFFICE VISIT (OUTPATIENT)
Dept: FAMILY MEDICINE CLINIC | Age: 57
End: 2017-11-21

## 2017-11-21 ENCOUNTER — HOSPITAL ENCOUNTER (OUTPATIENT)
Dept: LAB | Age: 57
Discharge: HOME | End: 2017-11-21
Payer: OTHER GOVERNMENT

## 2017-11-21 VITALS
BODY MASS INDEX: 27.97 KG/M2 | DIASTOLIC BLOOD PRESSURE: 72 MMHG | OXYGEN SATURATION: 99 % | TEMPERATURE: 96.8 F | HEIGHT: 62 IN | WEIGHT: 152 LBS | SYSTOLIC BLOOD PRESSURE: 118 MMHG | HEART RATE: 81 BPM | RESPIRATION RATE: 18 BRPM

## 2017-11-21 DIAGNOSIS — N95.1 HOT FLASHES DUE TO MENOPAUSE: ICD-10-CM

## 2017-11-21 DIAGNOSIS — I10 ESSENTIAL HYPERTENSION: ICD-10-CM

## 2017-11-21 DIAGNOSIS — Z98.84 S/P GASTRIC BYPASS: ICD-10-CM

## 2017-11-21 DIAGNOSIS — I10 ESSENTIAL HYPERTENSION: Primary | ICD-10-CM

## 2017-11-21 DIAGNOSIS — J30.89 OTHER ALLERGIC RHINITIS: ICD-10-CM

## 2017-11-21 DIAGNOSIS — F41.9 ANXIETY: ICD-10-CM

## 2017-11-21 DIAGNOSIS — R51.9 CHRONIC INTRACTABLE HEADACHE, UNSPECIFIED HEADACHE TYPE: ICD-10-CM

## 2017-11-21 DIAGNOSIS — G89.29 CHRONIC INTRACTABLE HEADACHE, UNSPECIFIED HEADACHE TYPE: ICD-10-CM

## 2017-11-21 LAB
ALBUMIN SERPL-MCNC: 4.1 G/DL (ref 3.4–5)
ALBUMIN/GLOB SERPL: 1.2 {RATIO} (ref 0.8–1.7)
ALP SERPL-CCNC: 77 U/L (ref 45–117)
ALT SERPL-CCNC: 17 U/L (ref 13–56)
ANION GAP SERPL CALC-SCNC: 12 MMOL/L (ref 3–18)
AST SERPL-CCNC: 25 U/L (ref 15–37)
BASOPHILS # BLD: 0 K/UL (ref 0–0.06)
BASOPHILS NFR BLD: 1 % (ref 0–2)
BILIRUB SERPL-MCNC: 0.5 MG/DL (ref 0.2–1)
BUN SERPL-MCNC: 9 MG/DL (ref 7–18)
BUN/CREAT SERPL: 14 (ref 12–20)
CALCIUM SERPL-MCNC: 9.5 MG/DL (ref 8.5–10.1)
CHLORIDE SERPL-SCNC: 101 MMOL/L (ref 100–108)
CHOLEST SERPL-MCNC: 248 MG/DL
CO2 SERPL-SCNC: 30 MMOL/L (ref 21–32)
CREAT SERPL-MCNC: 0.65 MG/DL (ref 0.6–1.3)
DIFFERENTIAL METHOD BLD: ABNORMAL
EOSINOPHIL # BLD: 0 K/UL (ref 0–0.4)
EOSINOPHIL NFR BLD: 1 % (ref 0–5)
ERYTHROCYTE [DISTWIDTH] IN BLOOD BY AUTOMATED COUNT: 14.4 % (ref 11.6–14.5)
FOLATE SERPL-MCNC: >20 NG/ML (ref 3.1–17.5)
GLOBULIN SER CALC-MCNC: 3.4 G/DL (ref 2–4)
GLUCOSE SERPL-MCNC: 89 MG/DL (ref 74–99)
HCT VFR BLD AUTO: 36.1 % (ref 35–45)
HDLC SERPL-MCNC: 142 MG/DL (ref 40–60)
HDLC SERPL: 1.7 {RATIO} (ref 0–5)
HGB BLD-MCNC: 11.6 G/DL (ref 12–16)
IRON SATN MFR SERPL: 21 %
IRON SERPL-MCNC: 92 UG/DL (ref 50–175)
LDLC SERPL CALC-MCNC: 91.4 MG/DL (ref 0–100)
LIPID PROFILE,FLP: ABNORMAL
LYMPHOCYTES # BLD: 2 K/UL (ref 0.9–3.6)
LYMPHOCYTES NFR BLD: 45 % (ref 21–52)
MCH RBC QN AUTO: 29.4 PG (ref 24–34)
MCHC RBC AUTO-ENTMCNC: 32.1 G/DL (ref 31–37)
MCV RBC AUTO: 91.6 FL (ref 74–97)
MONOCYTES # BLD: 0.3 K/UL (ref 0.05–1.2)
MONOCYTES NFR BLD: 8 % (ref 3–10)
NEUTS SEG # BLD: 1.9 K/UL (ref 1.8–8)
NEUTS SEG NFR BLD: 45 % (ref 40–73)
PLATELET # BLD AUTO: 312 K/UL (ref 135–420)
PMV BLD AUTO: 12 FL (ref 9.2–11.8)
POTASSIUM SERPL-SCNC: 4.5 MMOL/L (ref 3.5–5.5)
PROT SERPL-MCNC: 7.5 G/DL (ref 6.4–8.2)
RBC # BLD AUTO: 3.94 M/UL (ref 4.2–5.3)
SODIUM SERPL-SCNC: 143 MMOL/L (ref 136–145)
TIBC SERPL-MCNC: 433 UG/DL (ref 250–450)
TRIGL SERPL-MCNC: 73 MG/DL (ref ?–150)
VIT B12 SERPL-MCNC: 1323 PG/ML (ref 211–911)
VLDLC SERPL CALC-MCNC: 14.6 MG/DL
WBC # BLD AUTO: 4.2 K/UL (ref 4.6–13.2)

## 2017-11-21 PROCEDURE — 85025 COMPLETE CBC W/AUTO DIFF WBC: CPT | Performed by: FAMILY MEDICINE

## 2017-11-21 PROCEDURE — 83540 ASSAY OF IRON: CPT | Performed by: FAMILY MEDICINE

## 2017-11-21 PROCEDURE — 80061 LIPID PANEL: CPT | Performed by: FAMILY MEDICINE

## 2017-11-21 PROCEDURE — 82607 VITAMIN B-12: CPT | Performed by: FAMILY MEDICINE

## 2017-11-21 PROCEDURE — 84425 ASSAY OF VITAMIN B-1: CPT | Performed by: FAMILY MEDICINE

## 2017-11-21 PROCEDURE — 36415 COLL VENOUS BLD VENIPUNCTURE: CPT | Performed by: FAMILY MEDICINE

## 2017-11-21 PROCEDURE — 80053 COMPREHEN METABOLIC PANEL: CPT | Performed by: FAMILY MEDICINE

## 2017-11-21 RX ORDER — CETIRIZINE HCL 10 MG
10 TABLET ORAL
Qty: 90 TAB | Refills: 1 | Status: SHIPPED | OUTPATIENT
Start: 2017-11-21 | End: 2018-05-02 | Stop reason: SDUPTHER

## 2017-11-21 RX ORDER — VENLAFAXINE HYDROCHLORIDE 75 MG/1
75 CAPSULE, EXTENDED RELEASE ORAL DAILY
Qty: 90 CAP | Refills: 1 | Status: SHIPPED | OUTPATIENT
Start: 2017-11-21 | End: 2018-05-02 | Stop reason: SDUPTHER

## 2017-11-21 RX ORDER — HYDROCHLOROTHIAZIDE 25 MG/1
25 TABLET ORAL DAILY
Qty: 90 TAB | Refills: 1 | Status: SHIPPED | OUTPATIENT
Start: 2017-11-21 | End: 2017-12-29 | Stop reason: SDUPTHER

## 2017-11-21 NOTE — PROGRESS NOTES
Tahir Yates 65 y.o. female   Chief Complaint   Patient presents with    Follow-up    Hypertension    Cholesterol Problem    Allergic Rhinitis    Medication Refill         1. Have you been to the ER, urgent care clinic since your last visit? Hospitalized since your last visit? No    2. Have you seen or consulted any other health care providers outside of the [address] [place] [address] since your last visit? Include any pap smears or colon screening.  No

## 2017-11-21 NOTE — PROGRESS NOTES
HISTORY OF PRESENT ILLNESS  Christianne Adu Meryle Kea is a 65 y.o. female. Chief Complaint   Patient presents with    Follow-up    Hypertension    Cholesterol Problem    Allergic Rhinitis    Medication Refill       HPI  Pt is here for follow up of Htn, lipids, and Allergic Rhinitis. Pt reports that her stomach pain improved when she resumed her nexium so she did not see bariatric surg. Her wheezing stopped after using the abx so she is not using the inhalers. Pt states that she is fasting today for labs. Pt does need medication refills today. Pt requests electronic refills. New concerns today:  Pt c/o ha in the back of her head. Sometimes it is so bad that she cannot lift her head from the pillow. Review of Systems   Constitutional: Negative. HENT: Negative. Respiratory: Negative. Cardiovascular: Negative. Neurological: Positive for headaches. All other systems reviewed and are negative. Physical Exam  Physical Exam   Nursing note and vitals reviewed. Constitutional: She is oriented to person, place, and time. She appears well-developed and well-nourished. HENT:   Head: Normocephalic and atraumatic. Right Ear: External ear normal.   Left Ear: External ear normal.   Nose: Nose normal.   Eyes: Conjunctivae and EOM are normal.   Neck: Normal range of motion. Neck supple. No JVD present. Carotid bruit is not present. No thyromegaly present. Cardiovascular: Normal rate, regular rhythm, normal heart sounds and intact distal pulses. Exam reveals no gallop and no friction rub. No murmur heard. Pulmonary/Chest: Effort normal and breath sounds normal. She has no wheezes. She has no rhonchi. She has no rales. Abdominal: Soft. Bowel sounds are normal.   Musculoskeletal: Normal range of motion. Neurological: She is alert and oriented to person, place, and time. Coordination normal.   Skin: Skin is warm and dry. Psychiatric: She has a normal mood and affect.  Her behavior is normal. Judgment and thought content normal.     ASSESSMENT and PLAN  Diagnoses and all orders for this visit:    1. Essential hypertension  -     hydroCHLOROthiazide (HYDRODIURIL) 25 mg tablet; Take 1 Tab by mouth daily.  -     CBC WITH AUTOMATED DIFF; Future  -     METABOLIC PANEL, COMPREHENSIVE; Future  -     LIPID PANEL; Future  Stable, cont pres tx plan. 2. Anxiety  -     venlafaxine-SR (EFFEXOR-XR) 75 mg capsule; Take 1 Cap by mouth daily. 3. Hot flashes due to menopause  -     venlafaxine-SR (EFFEXOR-XR) 75 mg capsule; Take 1 Cap by mouth daily. 4. Other allergic rhinitis  -     cetirizine (ZYRTEC) 10 mg tablet; Take 1 Tab by mouth nightly. 5. Chronic intractable headache, unspecified headache type  -     REFERRAL TO NEUROLOGY    6. S/P gastric bypass  -     CBC WITH AUTOMATED DIFF; Future  -     METABOLIC PANEL, COMPREHENSIVE; Future  -     LIPID PANEL; Future  -     IRON PROFILE; Future  -     VITAMIN B12 & FOLATE;  Future  -     VITAMIN B1, WHOLE BLOOD; Future      Follow-up Disposition: 3 months; sooner prn

## 2017-11-26 LAB — VIT B1 BLD-SCNC: 62.8 NMOL/L (ref 66.5–200)

## 2017-11-28 ENCOUNTER — TELEPHONE (OUTPATIENT)
Dept: FAMILY MEDICINE CLINIC | Age: 57
End: 2017-11-28

## 2017-11-28 NOTE — TELEPHONE ENCOUNTER
Pt called and stated that she came into office on Nov 21 st for sinus infection. Pt stated that she is not feeling better and symptoms are getting worst, Yellow mucus and congestion. Asked nurse Jeison Avelar and she stated that patient would need an appointment. Pt stated that she was just seen and spoke with  About condition and does not feel that she needs to come back in. Also pt express that she [place] far way. Pt stated that she would like to know if Dr. Carin Howard herself would like her to come in for appointment. Pt is requesting an Antibiotic for sinus infection along with Diflucan. Pt states she gets an yeast infection anytime she takes antibiotic please advise.

## 2017-12-07 ENCOUNTER — TELEPHONE (OUTPATIENT)
Dept: FAMILY MEDICINE CLINIC | Age: 57
End: 2017-12-07

## 2017-12-07 NOTE — TELEPHONE ENCOUNTER
Pt needs an order for an annual mammogram faxed to [facility] so she can make an appointment.   J#[phone]

## 2017-12-11 NOTE — PROGRESS NOTES
Please notify pt:  Cont to hold B12 for another month before resuming. If she has a separate vit B1, she should be taking that daily.

## 2017-12-29 DIAGNOSIS — G47.00 INSOMNIA, UNSPECIFIED TYPE: Primary | ICD-10-CM

## 2017-12-29 DIAGNOSIS — I10 ESSENTIAL HYPERTENSION: ICD-10-CM

## 2017-12-29 RX ORDER — TELMISARTAN 80 MG/1
80 TABLET ORAL DAILY
Qty: 90 TAB | Refills: 3 | Status: SHIPPED | OUTPATIENT
Start: 2017-12-29 | End: 2018-08-02 | Stop reason: SDUPTHER

## 2017-12-29 RX ORDER — HYDROCHLOROTHIAZIDE 25 MG/1
25 TABLET ORAL DAILY
Qty: 90 TAB | Refills: 1 | Status: SHIPPED | OUTPATIENT
Start: 2017-12-29 | End: 2020-01-21 | Stop reason: SDUPTHER

## 2017-12-29 RX ORDER — ESZOPICLONE 2 MG/1
2 TABLET, FILM COATED ORAL
Qty: 90 TAB | Refills: 0 | Status: SHIPPED | OUTPATIENT
Start: 2017-12-29 | End: 2018-01-12 | Stop reason: SDUPTHER

## 2017-12-29 NOTE — TELEPHONE ENCOUNTER
From: Cesar Duran  To: Jose Carlos Soares MD  Sent: 12/29/2017 12:00 PM EST  Subject: Medication Renewal Request    Original authorizing provider: MD Kelly Jackson.  Jordana Fernandes would like a refill of the following medications:  telmisartan (MICARDIS) 80 mg tablet Jose Carlos Soares MD]  eszopiclone (LUNESTA) 2 mg tablet Jose Carlos Soares MD]  hydroCHLOROthiazide (HYDRODIURIL) 25 mg tablet Jose Carlos Soares MD]    Preferred pharmacy: [facility]:

## 2018-01-12 DIAGNOSIS — F41.9 ANXIETY: ICD-10-CM

## 2018-01-12 DIAGNOSIS — G47.00 INSOMNIA, UNSPECIFIED TYPE: ICD-10-CM

## 2018-01-15 RX ORDER — ALPRAZOLAM 0.5 MG/1
0.5 TABLET ORAL
Qty: 90 TAB | Refills: 0 | OUTPATIENT
Start: 2018-01-15 | End: 2020-03-11 | Stop reason: ALTCHOICE

## 2018-01-15 RX ORDER — ESZOPICLONE 2 MG/1
2 TABLET, FILM COATED ORAL
Qty: 90 TAB | Refills: 0 | OUTPATIENT
Start: 2018-01-15 | End: 2018-08-02 | Stop reason: DRUGHIGH

## 2018-01-25 LAB — MAMMOGRAPHY, EXTERNAL: NEGATIVE

## 2018-03-08 ENCOUNTER — OFFICE VISIT (OUTPATIENT)
Dept: FAMILY MEDICINE CLINIC | Age: 58
End: 2018-03-08

## 2018-03-08 VITALS
WEIGHT: 155 LBS | DIASTOLIC BLOOD PRESSURE: 83 MMHG | BODY MASS INDEX: 28.52 KG/M2 | HEART RATE: 84 BPM | HEIGHT: 62 IN | RESPIRATION RATE: 16 BRPM | OXYGEN SATURATION: 97 % | SYSTOLIC BLOOD PRESSURE: 125 MMHG

## 2018-03-08 DIAGNOSIS — Z98.84 S/P GASTRIC BYPASS: ICD-10-CM

## 2018-03-08 DIAGNOSIS — L30.9 DERMATITIS: ICD-10-CM

## 2018-03-08 DIAGNOSIS — Z79.890 POSTMENOPAUSAL HRT (HORMONE REPLACEMENT THERAPY): ICD-10-CM

## 2018-03-08 DIAGNOSIS — I10 ESSENTIAL HYPERTENSION: Primary | ICD-10-CM

## 2018-03-08 DIAGNOSIS — N81.6 RECTOCELE: ICD-10-CM

## 2018-03-08 DIAGNOSIS — E78.5 HYPERLIPIDEMIA, UNSPECIFIED HYPERLIPIDEMIA TYPE: ICD-10-CM

## 2018-03-08 DIAGNOSIS — K21.9 GASTROESOPHAGEAL REFLUX DISEASE, ESOPHAGITIS PRESENCE NOT SPECIFIED: ICD-10-CM

## 2018-03-08 RX ORDER — OMEPRAZOLE/SODIUM BICARBONATE 40; 1680 MG/1; MG/1
POWDER, FOR SUSPENSION ORAL
COMMUNITY
End: 2018-03-08 | Stop reason: CLARIF

## 2018-03-08 RX ORDER — TRIAMCINOLONE ACETONIDE 1 MG/G
OINTMENT TOPICAL 2 TIMES DAILY
Qty: 80 G | Refills: 1 | Status: SHIPPED | OUTPATIENT
Start: 2018-03-08 | End: 2019-12-13 | Stop reason: ALTCHOICE

## 2018-03-08 RX ORDER — PANTOPRAZOLE SODIUM 40 MG/1
40 TABLET, DELAYED RELEASE ORAL DAILY
Qty: 90 TAB | Refills: 3 | Status: SHIPPED | OUTPATIENT
Start: 2018-03-08 | End: 2018-05-14 | Stop reason: ALTCHOICE

## 2018-03-08 RX ORDER — PANTOPRAZOLE SODIUM 40 MG/1
40 TABLET, DELAYED RELEASE ORAL DAILY
Qty: 90 TAB | Refills: 3 | Status: SHIPPED | OUTPATIENT
Start: 2018-03-08 | End: 2018-03-08 | Stop reason: SDUPTHER

## 2018-03-08 RX ORDER — OMEPRAZOLE/SODIUM BICARBONATE 40; 1680 MG/1; MG/1
40 POWDER, FOR SUSPENSION ORAL DAILY
Qty: 90 PACKET | Refills: 1 | Status: CANCELLED | OUTPATIENT
Start: 2018-03-08

## 2018-03-08 NOTE — PROGRESS NOTES
Yoshi Meneses 65 y.o. female   Chief Complaint   Patient presents with    Follow-up    Hypertension    Labs     completed on 11-21-18    Anxiety    Menopause    Medication Refill         1. Have you been to the ER, urgent care clinic since your last visit? Hospitalized since your last visit? No    2. Have you seen or consulted any other health care providers outside of the [address] [place] [address] since your last visit? Include any pap smears or colon screening.  No

## 2018-03-08 NOTE — PROGRESS NOTES
HISTORY OF PRESENT ILLNESS  Valla Albe Gita Severance is a 65 y.o. female. Chief Complaint   Patient presents with    Follow-up    Hypertension    Labs     completed on 11-21-18    Anxiety    Menopause    Medication Refill       HPI  Patient is here for a  follow up of Htn, lipids, and menopause. Patient had labs on 11-21-18. Labs reviewed in detail with patient     Patient does need medication refills today. Patient requests printed refills. New concerns today: rash on the right thigh for x 2 months. Patient reports that the rash in small in size, and has itching. Patient reports that the rash has not spread to other body parts. She has tried neosporin, triamcinolone cream, and aquaphor. Pt has seen neuro (Dr Sangeetha Peres) for eval of her memory concerns and posterior ha. He reviewed an old mri but he is ordering another for eval of her current concerns. Pt has started taking the B1 supplement. Pt needs referral to her gyn for f/u of her hrt. Pt has a rectocele that remains bothersome. She has pain with stooling. Pt would like to see someone. Review of Systems   Constitutional: Negative. HENT: Negative. Respiratory: Negative. Cardiovascular: Negative. Genitourinary:        Rectocele with associated pain during stooling   Skin: Positive for itching and rash. All other systems reviewed and are negative. Physical Exam  Physical Exam   Nursing note and vitals reviewed. Constitutional: She is oriented to person, place, and time. She appears well-developed and well-nourished. HENT:   Head: Normocephalic and atraumatic. Right Ear: External ear normal.   Left Ear: External ear normal.   Nose: Nose normal.   Eyes: Conjunctivae and EOM are normal.   Neck: Normal range of motion. Neck supple. No JVD present. Carotid bruit is not present. No thyromegaly present. Cardiovascular: Normal rate, regular rhythm, normal heart sounds and intact distal pulses.   Exam reveals no gallop and no friction rub. No murmur heard. Pulmonary/Chest: Effort normal and breath sounds normal. She has no wheezes. She has no rhonchi. She has no rales. Abdominal: Soft. Bowel sounds are normal.   Musculoskeletal: Normal range of motion. Neurological: She is alert and oriented to person, place, and time. Coordination normal.   Skin: Skin is warm and dry. Psychiatric: She has a normal mood and affect. Her behavior is normal. Judgment and thought content normal.     ASSESSMENT and PLAN  Diagnoses and all orders for this visit:    1. Dermatitis  -     triamcinolone acetonide (KENALOG) 0.1 % ointment; Apply  to affected area two (2) times a day. use thin layer. Pt to use bid x 2 wks. Would refer if not improved. 2. Gastroesophageal reflux disease, esophagitis presence not specified  -   Trial:   pantoprazole (PROTONIX) 40 mg tablet; Take 1 Tab by mouth daily. 3. Postmenopausal HRT (hormone replacement therapy)  -     REFERRAL TO OBSTETRICS AND GYNECOLOGY    4. Rectocele  -     REFERRAL TO FEMALE PELVIC MEDICINE AND RECONSTRUCTIVE SURGERY    5. Essential hypertension  Stable, cont pres tx plan. Labs prior to next appt. 6. Hyperlipidemia, unspecified hyperlipidemia type  Labs prior to next appt. 7. S/P gastric bypass  Stable, cont pres tx plan. Labs prior to next appt.      Follow-up Disposition: 3 months; sooner prn

## 2018-03-19 ENCOUNTER — TELEPHONE (OUTPATIENT)
Dept: FAMILY MEDICINE CLINIC | Age: 58
End: 2018-03-19

## 2018-03-19 NOTE — TELEPHONE ENCOUNTER
Pt has decided she would like to go ahead with the referral to Dr. Jayme Yoder at [facility] in [address] [place] [address] [phone].

## 2018-05-02 DIAGNOSIS — J30.89 OTHER ALLERGIC RHINITIS: ICD-10-CM

## 2018-05-02 DIAGNOSIS — N95.1 HOT FLASHES DUE TO MENOPAUSE: ICD-10-CM

## 2018-05-02 DIAGNOSIS — F41.9 ANXIETY: ICD-10-CM

## 2018-05-03 RX ORDER — VENLAFAXINE HYDROCHLORIDE 75 MG/1
CAPSULE, EXTENDED RELEASE ORAL
Qty: 90 CAP | Refills: 1 | Status: SHIPPED | OUTPATIENT
Start: 2018-05-03 | End: 2018-09-10 | Stop reason: SDUPTHER

## 2018-05-03 RX ORDER — CETIRIZINE HCL 10 MG
TABLET ORAL
Qty: 90 TAB | Refills: 1 | Status: SHIPPED | OUTPATIENT
Start: 2018-05-03 | End: 2020-03-27 | Stop reason: SDUPTHER

## 2018-05-07 NOTE — TELEPHONE ENCOUNTER
protonix is not working,pt said the nexium is the only one that works--pls could this be called in---would also like all prescription to be written for her to

## 2018-05-08 RX ORDER — VENLAFAXINE HYDROCHLORIDE 37.5 MG/1
CAPSULE, EXTENDED RELEASE ORAL
Qty: 166 CAP | Refills: 0 | OUTPATIENT
Start: 2018-05-08

## 2018-05-14 RX ORDER — ESOMEPRAZOLE MAGNESIUM 40 MG/1
40 CAPSULE, DELAYED RELEASE ORAL DAILY
Qty: 90 CAP | Refills: 1 | Status: SHIPPED | OUTPATIENT
Start: 2018-05-14 | End: 2018-09-10 | Stop reason: SDUPTHER

## 2018-06-04 RX ORDER — ESOMEPRAZOLE MAGNESIUM 40 MG/1
40 CAPSULE, DELAYED RELEASE ORAL DAILY
Qty: 90 CAP | Refills: 1 | Status: CANCELLED | OUTPATIENT
Start: 2018-06-04

## 2018-06-04 NOTE — TELEPHONE ENCOUNTER
Requested Prescriptions     Pending Prescriptions Disp Refills    esomeprazole (NEXIUM) 40 mg capsule 90 Cap 1     Sig: Take 1 Cap by mouth daily.      To [facility]  [address],[place]--pt said nexium needs prior auth,it is the only medication that works for her

## 2018-07-10 DIAGNOSIS — I10 ESSENTIAL HYPERTENSION: ICD-10-CM

## 2018-07-10 NOTE — TELEPHONE ENCOUNTER
Pt called requesting refill for medication . Requested Prescriptions     Pending Prescriptions Disp Refills    amLODIPine (NORVASC) 10 mg tablet 90 Tab 3     Sig: Take 1 Tab by mouth daily. Pt also stated that she is still on leave form surgery. Please advise.

## 2018-07-12 RX ORDER — AMLODIPINE BESYLATE 10 MG/1
10 TABLET ORAL DAILY
Qty: 90 TAB | Refills: 3 | Status: SHIPPED | OUTPATIENT
Start: 2018-07-12 | End: 2020-06-29 | Stop reason: SDUPTHER

## 2018-08-02 ENCOUNTER — HOSPITAL ENCOUNTER (OUTPATIENT)
Dept: LAB | Age: 58
Discharge: HOME | End: 2018-08-02
Payer: OTHER GOVERNMENT

## 2018-08-02 ENCOUNTER — OFFICE VISIT (OUTPATIENT)
Dept: FAMILY MEDICINE CLINIC | Age: 58
End: 2018-08-02

## 2018-08-02 VITALS
TEMPERATURE: 97.6 F | WEIGHT: 156 LBS | SYSTOLIC BLOOD PRESSURE: 119 MMHG | HEIGHT: 62 IN | RESPIRATION RATE: 14 BRPM | BODY MASS INDEX: 28.71 KG/M2 | DIASTOLIC BLOOD PRESSURE: 73 MMHG | HEART RATE: 99 BPM

## 2018-08-02 DIAGNOSIS — I10 ESSENTIAL HYPERTENSION: Primary | ICD-10-CM

## 2018-08-02 DIAGNOSIS — E78.5 HYPERLIPIDEMIA, UNSPECIFIED HYPERLIPIDEMIA TYPE: ICD-10-CM

## 2018-08-02 DIAGNOSIS — Z51.81 MEDICATION MONITORING ENCOUNTER: ICD-10-CM

## 2018-08-02 DIAGNOSIS — G47.00 INSOMNIA, UNSPECIFIED TYPE: ICD-10-CM

## 2018-08-02 DIAGNOSIS — Z98.84 S/P GASTRIC BYPASS: ICD-10-CM

## 2018-08-02 DIAGNOSIS — N81.6 PROCTOCELE: ICD-10-CM

## 2018-08-02 DIAGNOSIS — R07.89 COSTOCHONDRAL CHEST PAIN: ICD-10-CM

## 2018-08-02 DIAGNOSIS — I10 ESSENTIAL HYPERTENSION: ICD-10-CM

## 2018-08-02 LAB
ALBUMIN SERPL-MCNC: 3.7 G/DL (ref 3.4–5)
ALBUMIN/GLOB SERPL: 1 {RATIO} (ref 0.8–1.7)
ALP SERPL-CCNC: 75 U/L (ref 45–117)
ALT SERPL-CCNC: 12 U/L (ref 13–56)
ANION GAP SERPL CALC-SCNC: 9 MMOL/L (ref 3–18)
AST SERPL-CCNC: 22 U/L (ref 15–37)
BILIRUB SERPL-MCNC: 0.4 MG/DL (ref 0.2–1)
BUN SERPL-MCNC: 14 MG/DL (ref 7–18)
BUN/CREAT SERPL: 20 (ref 12–20)
CALCIUM SERPL-MCNC: 9.1 MG/DL (ref 8.5–10.1)
CHLORIDE SERPL-SCNC: 104 MMOL/L (ref 100–108)
CHOLEST SERPL-MCNC: 267 MG/DL
CO2 SERPL-SCNC: 32 MMOL/L (ref 21–32)
CREAT SERPL-MCNC: 0.71 MG/DL (ref 0.6–1.3)
FOLATE SERPL-MCNC: >20 NG/ML (ref 3.1–17.5)
GLOBULIN SER CALC-MCNC: 3.6 G/DL (ref 2–4)
GLUCOSE SERPL-MCNC: 91 MG/DL (ref 74–99)
HDLC SERPL-MCNC: 148 MG/DL (ref 40–60)
HDLC SERPL: 1.8 {RATIO} (ref 0–5)
IRON SATN MFR SERPL: 17 %
IRON SERPL-MCNC: 77 UG/DL (ref 50–175)
LDLC SERPL CALC-MCNC: 103.4 MG/DL (ref 0–100)
LIPID PROFILE,FLP: ABNORMAL
POTASSIUM SERPL-SCNC: 4.8 MMOL/L (ref 3.5–5.5)
PROT SERPL-MCNC: 7.3 G/DL (ref 6.4–8.2)
SODIUM SERPL-SCNC: 145 MMOL/L (ref 136–145)
TIBC SERPL-MCNC: 446 UG/DL (ref 250–450)
TRIGL SERPL-MCNC: 78 MG/DL (ref ?–150)
VIT B12 SERPL-MCNC: 1217 PG/ML (ref 211–911)
VLDLC SERPL CALC-MCNC: 15.6 MG/DL

## 2018-08-02 PROCEDURE — 83550 IRON BINDING TEST: CPT | Performed by: FAMILY MEDICINE

## 2018-08-02 PROCEDURE — 80053 COMPREHEN METABOLIC PANEL: CPT | Performed by: FAMILY MEDICINE

## 2018-08-02 PROCEDURE — 36415 COLL VENOUS BLD VENIPUNCTURE: CPT | Performed by: FAMILY MEDICINE

## 2018-08-02 PROCEDURE — 82306 VITAMIN D 25 HYDROXY: CPT | Performed by: FAMILY MEDICINE

## 2018-08-02 PROCEDURE — 80061 LIPID PANEL: CPT | Performed by: FAMILY MEDICINE

## 2018-08-02 PROCEDURE — 82607 VITAMIN B-12: CPT | Performed by: FAMILY MEDICINE

## 2018-08-02 PROCEDURE — 84425 ASSAY OF VITAMIN B-1: CPT | Performed by: FAMILY MEDICINE

## 2018-08-02 RX ORDER — LANOLIN ALCOHOL/MO/W.PET/CERES
CREAM (GRAM) TOPICAL DAILY
COMMUNITY
End: 2019-12-13 | Stop reason: ALTCHOICE

## 2018-08-02 RX ORDER — TELMISARTAN 80 MG/1
80 TABLET ORAL DAILY
Qty: 90 TAB | Refills: 3 | Status: SHIPPED | OUTPATIENT
Start: 2018-08-02 | End: 2020-03-04 | Stop reason: SDUPTHER

## 2018-08-02 RX ORDER — POLYETHYLENE GLYCOL 3350 17 G/17G
POWDER, FOR SOLUTION ORAL
COMMUNITY
Start: 2018-07-03 | End: 2019-12-13 | Stop reason: ALTCHOICE

## 2018-08-02 RX ORDER — ALPRAZOLAM 0.5 MG/1
0.5 TABLET ORAL
Qty: 90 TAB | Refills: 0 | Status: CANCELLED | OUTPATIENT
Start: 2018-08-02

## 2018-08-02 RX ORDER — ESZOPICLONE 3 MG/1
3 TABLET, FILM COATED ORAL
Qty: 90 TAB | Refills: 0 | Status: SHIPPED | OUTPATIENT
Start: 2018-08-02 | End: 2018-11-09 | Stop reason: SDUPTHER

## 2018-08-02 NOTE — PROGRESS NOTES
Chief Complaint Patient presents with  Follow-up  Hypertension  Cholesterol Problem  GERD  Insomnia Patient would like to discuss a dosage increase of the lunesta.  Medication Refill 1. Have you been to the ER, urgent care clinic since your last visit? Hospitalized since your last visit? Yes [facility] for surgery 5-29-18 2. Have you seen or consulted any other health care providers outside of the [address] [place] [address] since your last visit? Include any pap smears or colon screening. 5-29-18 surgery

## 2018-08-02 NOTE — PROGRESS NOTES
HISTORY OF PRESENT ILLNESS Chelsey Pérez is a 65 y.o. female. Chief Complaint Patient presents with  Follow-up  Hypertension  Cholesterol Problem  GERD  Insomnia Patient would like to discuss a dosage increase of the lunesta.  Medication Refill HPI Patient is here for a  follow up of Htn, insomnia, GERD, and Cholesterol. Patient had labs completed in office today. Patient does need medication refills today. Patient requests printed refills. New concerns today: Patient would like to increase the dosage of her lunesta from 2 mg to 3 mg. Patient was given lunesta 3 mg at epicurio.  With the lunesta 2mg, she has awakenings during the night. Pt reports that on 5/29/18 she had surgical repair of a rectocele and posterior vaginal prolapse. It went uneventfully. Pt was out of work for 7 wks; she has been instructed to have 12 wks of pelvic rest.   
 
Pt also c/o L chest pain. She is concerned and wonders if she needs an xray. She feels it more with adduction of the L arm or deep inspiration. She has had a recent nl mammogram.   
 
Review of Systems Constitutional: Negative. HENT: Negative. Respiratory: Negative. Cardiovascular: Positive for chest pain. Negative for palpitations. Psychiatric/Behavioral: The patient has insomnia. All other systems reviewed and are negative. Physical Exam  
Constitutional: She is oriented to person, place, and time. She appears well-developed and well-nourished. No distress. HENT:  
Head: Normocephalic and atraumatic. Right Ear: External ear normal.  
Left Ear: External ear normal.  
Nose: Nose normal.  
Eyes: Conjunctivae and EOM are normal.  
Neck: Normal range of motion. Neck supple. No JVD present. No thyromegaly present. Cardiovascular: Normal rate, regular rhythm and normal heart sounds. Exam reveals no gallop and no friction rub. No murmur heard.  
Pulmonary/Chest: Effort normal and breath sounds normal. She has no wheezes. She has no rhonchi. She has no rales. She exhibits tenderness. Musculoskeletal: Normal range of motion. Lymphadenopathy:  
  She has no cervical adenopathy. Neurological: She is alert and oriented to person, place, and time. Coordination normal.  
Skin: Skin is warm and dry. Psychiatric: She has a normal mood and affect. Her behavior is normal. Judgment and thought content normal.  
Nursing note and vitals reviewed. ASSESSMENT and PLAN Diagnoses and all orders for this visit: 1. Essential hypertension 
-     telmisartan (MICARDIS) 80 mg tablet; Take 1 Tab by mouth daily. Stable, cont pres tx plan. 2. Medication monitoring encounter 
-     COMPLIANCE DRUG SCREEN/PRESCRIPTION MONITORING; Future 3. Insomnia, unspecified type 
-     eszopiclone (LUNESTA) 3 mg tablet; Take 1 Tab by mouth nightly. Max Daily Amount: 3 mg. Dose increased as per treatment rendered during recent inpt admission. 4. Costochondral chest pain Pt reassured. Suspect surgical positioning as etiology. No further testing indicated at this time. 5. Proctocele Healing well per pt.   Care as per gyn team.  
 
Follow-up Disposition: 3 months; sooner prn

## 2018-08-03 LAB — 25(OH)D3 SERPL-MCNC: 16.5 NG/ML (ref 30–100)

## 2018-08-05 LAB — VIT B1 BLD-SCNC: 130.9 NMOL/L (ref 66.5–200)

## 2018-08-09 ENCOUNTER — TELEPHONE (OUTPATIENT)
Dept: FAMILY MEDICINE CLINIC | Age: 58
End: 2018-08-09

## 2018-08-10 NOTE — TELEPHONE ENCOUNTER
LVM for patient to return my call. Per DR. Rodriguez the patient will need to go to the ER if she is experiencing more pain in the chest, but a xray is not warranted at this time.

## 2018-09-10 DIAGNOSIS — N95.1 HOT FLASHES DUE TO MENOPAUSE: ICD-10-CM

## 2018-09-10 DIAGNOSIS — F41.9 ANXIETY: ICD-10-CM

## 2018-09-10 NOTE — TELEPHONE ENCOUNTER
Patient needs a medication refill patient would like to come and  the scripts. Please advise    Requested Prescriptions     Pending Prescriptions Disp Refills    esomeprazole (NEXIUM) 40 mg capsule 90 Cap 1     Sig: Take 1 Cap by mouth daily.     venlafaxine-SR (EFFEXOR-XR) 75 mg capsule 90 Cap 1

## 2018-09-12 RX ORDER — ESOMEPRAZOLE MAGNESIUM 40 MG/1
40 CAPSULE, DELAYED RELEASE ORAL DAILY
Qty: 90 CAP | Refills: 1 | Status: SHIPPED | OUTPATIENT
Start: 2018-09-12 | End: 2020-04-06

## 2018-09-12 RX ORDER — VENLAFAXINE HYDROCHLORIDE 75 MG/1
CAPSULE, EXTENDED RELEASE ORAL
Qty: 90 CAP | Refills: 1 | Status: SHIPPED | OUTPATIENT
Start: 2018-09-12 | End: 2019-12-13 | Stop reason: ALTCHOICE

## 2019-03-14 LAB — MAMMOGRAPHY, EXTERNAL: NORMAL

## 2019-12-13 ENCOUNTER — OFFICE VISIT (OUTPATIENT)
Dept: FAMILY MEDICINE CLINIC | Age: 59
End: 2019-12-13

## 2019-12-13 ENCOUNTER — HOSPITAL ENCOUNTER (OUTPATIENT)
Dept: LAB | Age: 59
Discharge: HOME | End: 2019-12-13
Payer: OTHER GOVERNMENT

## 2019-12-13 VITALS
SYSTOLIC BLOOD PRESSURE: 137 MMHG | RESPIRATION RATE: 18 BRPM | WEIGHT: 160.8 LBS | DIASTOLIC BLOOD PRESSURE: 86 MMHG | HEART RATE: 83 BPM | HEIGHT: 62 IN | TEMPERATURE: 97.9 F | BODY MASS INDEX: 29.59 KG/M2

## 2019-12-13 DIAGNOSIS — G47.00 INSOMNIA, UNSPECIFIED TYPE: ICD-10-CM

## 2019-12-13 DIAGNOSIS — I10 ESSENTIAL HYPERTENSION: Primary | ICD-10-CM

## 2019-12-13 DIAGNOSIS — Z51.81 MEDICATION MONITORING ENCOUNTER: ICD-10-CM

## 2019-12-13 DIAGNOSIS — G89.29 CHRONIC PAIN OF RIGHT THUMB: ICD-10-CM

## 2019-12-13 DIAGNOSIS — E78.5 HYPERLIPIDEMIA, UNSPECIFIED HYPERLIPIDEMIA TYPE: ICD-10-CM

## 2019-12-13 DIAGNOSIS — D50.9 IRON DEFICIENCY ANEMIA, UNSPECIFIED IRON DEFICIENCY ANEMIA TYPE: ICD-10-CM

## 2019-12-13 DIAGNOSIS — Z98.84 S/P GASTRIC BYPASS: ICD-10-CM

## 2019-12-13 DIAGNOSIS — M79.644 CHRONIC PAIN OF RIGHT THUMB: ICD-10-CM

## 2019-12-13 PROCEDURE — 80307 DRUG TEST PRSMV CHEM ANLYZR: CPT

## 2019-12-13 RX ORDER — ALPRAZOLAM 0.5 MG/1
0.5 TABLET ORAL
Qty: 90 TAB | Refills: 0 | Status: CANCELLED | OUTPATIENT
Start: 2019-12-13

## 2019-12-13 RX ORDER — ESZOPICLONE 3 MG/1
3 TABLET, FILM COATED ORAL
Qty: 90 TAB | Refills: 0 | Status: SHIPPED | OUTPATIENT
Start: 2019-12-13 | End: 2020-03-11 | Stop reason: SDUPTHER

## 2019-12-13 RX ORDER — ESTROGENS, CONJUGATED 0.3 MG/1
0.3 TABLET, FILM COATED ORAL
COMMUNITY
Start: 2019-11-03 | End: 2020-02-11 | Stop reason: SDUPTHER

## 2019-12-13 NOTE — PROGRESS NOTES
Gladis Santana presents today for   Chief Complaint   Patient presents with    Hypertension     follow up    Insomnia    Labs     completed 8/2/18    Medication Refill       Gladis Santana preferred language for health care discussion is english/other. Is someone accompanying this pt? no    Is the patient using any DME equipment during [address]? no    Depression Screening:  3 most recent PHQ Screens 12/13/2019   Little interest or pleasure in doing things Not at all   Feeling down, depressed, irritable, or hopeless Not at all   Total Score PHQ 2 0       Learning Assessment:  Learning Assessment 12/13/2019   PRIMARY LEARNER Patient   HIGHEST LEVEL OF EDUCATION - PRIMARY LEARNER  SOME COLLEGE   BARRIERS PRIMARY LEARNER NONE   CO-LEARNER CAREGIVER No   PRIMARY LANGUAGE ENGLISH   LEARNER PREFERENCE PRIMARY LISTENING     -     -     -     -   ANSWERED BY self   RELATIONSHIP SELF       Abuse Screening:  Abuse Screening Questionnaire 12/13/2019   Do you ever feel afraid of your partner? N   Are you in a relationship with someone who physically or mentally threatens you? N   Is it safe for you to go home? Y       Generalized Anxiety  No flowsheet data found. Health Maintenance Due   Topic Date Due    DTaP/Tdap/Td series (1 - Tdap) 10/13/1972    Shingrix Vaccine Age 50> (1 of 2) 10/13/2011    BREAST CANCER SCRN MAMMOGRAM  01/25/2019    Influenza Age 9 to Adult  08/01/2019   . Health Maintenance reviewed and discussed and ordered per Provider. Tomnic Max is updated on all     Coordination of Care:  1. Have you been to the ER, urgent care clinic since your last visit? Hospitalized since your last visit? no    2. Have you seen or consulted any other health care providers outside of the [address] [place] [address] since your last visit? Include any pap smears or colon screening. no      Advance Directive:  1. Do you have an advance directive in place? Patient Reply:no    2.  If not, would you like material regarding how to put one in place?  Patient Reply: no  Last  Checked 12/13/19  Last UDS Checked 8/3/17  Last Pain contract signed: 8/3/17

## 2019-12-13 NOTE — LETTER
Name:Sebastian Hollingsworth :1960 MR #:[id number] Provider Name:Micah Rodriguez MD  
*ICZR-651* BSMG-491 () Page 1 of 5 Initial [employer] CONTROLLED SUBSTANCE AGREEMENT I may be prescribed medications that are controlled substances as part  of my treatment plan for management of my medical condition(s). The goal of my treatment plan is to maintain and/or improve my health and wellbeing. Because controlled substances have an increased risk of abuse or harm, continual re-evaluation is needed determine if the goals of my treatment plan are being met for my safety and the safety of others. Mari Aleman  am entering into this Controlled Substance Agreement with my provider, Kady Shipman MD at [place] [address] . I understand that successful treatment requires mutual trust and honesty between me and my provider. I understand that there are state and federal laws and regulations which apply to the medications that my provider may prescribe that must be followed. I understand there are risks and benefits ts of taking the medicines that my provider may prescribe. I understand and agree that following this Agreement is necessary in continuing my provider-patient relationship and success of my treatment plan. As a part of my treatment plan, I agree to the following: COMMUNICATION: 
 
1. I will communicate fully with my provider about my medical condition(s), including the effect on my daily life and how well my medications are helping. I will tell my provider all of the medications that I take for any reason, including medications I receive from another health care provider, and will notify my provider about all issues, problems or concerns, including any side effects, which may be related to my medications. I understand that this information allows my provider to adjust my treatment plan to help manage my medical condition.  I understand that this information will become part of my permanent medical record. 2. I will notify my provider if I have a history of alcohol/drug misuse/addiction or if I have had treatment for alcohol/drug addiction in the past, or if I have a new problem with or concern about alcohol/drug use/addiction, because this increases the likelihood of high risk behaviors and may lead to serious medical conditions. 3. Females Only: I will notify my provider if I am or become pregnant, or if I intend to become pregnant, or if I intend to breastfeed. I understand that communication of these issues with my provider is important, due to possible effects my medication could have on an unborn fetus or breastfeeding child. Name:Sebastian Hollingsworth :1960 MR #:[id number] Provider Name:Micah Rodriguez MD  
*LGBX-709* BSMG-491 () Page 2 of 5 Initial [employer] MISUSE OF MEDICATIONS / DRUGS: 
 
1. I agree to take all controlled substances as prescribed, and will not misuse or abuse any controlled substances prescribed by my provider. For my safety, I will not increase the amount of medicine I take without first talking with and getting permission from my provider. 2. If I have a medical emergency, another health care provider may prescribe me medication. If I seek emergency treatment, I will notify my provider within seventy-two (72) hours. 3. I understand that my provider may discuss my use and/or possible misuse/abuse of controlled substances and alcohol, as appropriate, with any health care provider involved in my care, pharmacist or legal authority. ILLEGAL DRUGS: 
 
1. I will not use illegal drugs of any kind, including but not limited to marijuana, heroin, cocaine, or any prescription drug which is not prescribed to me. DRUG DIVERSION / PRESCRIPTION FRAUD: 
 
1. I will not share, sell, trade, give away, or otherwise misuse my prescriptions or medications. 2. I will not alter any prescriptions provided to me by my provider. SINGLE PROVIDER: 
 
1. I agree that all controlled substances that I take will be prescribed only by my provider (or his/her covering provider) under this Agreement. This agreement does not prevent me from seeking emergency medical treatment or receiving pain management related to a surgery. PROTECTING MEDICATIONS: 
 
1. I am responsible for keeping my prescriptions and medications in a safe and secure place including safeguarding them from loss or theft. I understand that lost, stolen or damaged/destroyed prescriptions or medications will not be replaced. Name:.Anahi Weiner :1960 MR #:[id number] Provider Name:Alka Rodriguez MD  
*MFQD-188* BSMG-491 () Page 3 of 5 Initial [employer] PRESCRIPTION RENEWALS/REFILLS: 
 
1. I will follow my controlled substance medication schedule as prescribed by my provider. 2. I understand and agree that I will make any requests for renewals or refills of my prescriptions only at the time of an office visit or during my providers regular office hours subject to the prescription refill requirements of the individual practice. 3. I understand that my provider may not call in prescriptions for controlled substances to my pharmacy. 4. I understand that my provider may adjust or discontinue these medications as deemed appropriate for my medical treatment plan. This Agreement does not guarantee the prescription of controlled medications. 5. I agree that if my medications are adjusted or discontinued, I will properly dispose of any remaining medications. I understand that I will be required to dispose of any remaining controlled medications prior to being provided with any prescriptions for other controlled medications.  
 
 
1. I authorize my provider and my pharmacy to cooperate fully with any local, state, or federal law enforcement agency in the investigation of any possible misuse, sale, or other diversion of my controlled substance prescriptions or medications. RISKS: 
 
 
1. I understand that if I do not adhere to this Agreement in any way, my provider may change my prescriptions, stop prescribing controlled substances or end our provider-patient relationship. 2. If my provider decides to stop prescribing medication, or decides to end our provider-patient relationship,my provider may require that I taper my medications slowly. If necessary, my provider may also provide a prescription for other medications to treat my withdrawal symptoms. UNDERSTANDING THIS AGREEMENT: 
 
I understand that my provider may adjust or stop my prescriptions for controlled substances based on my medical condition and my treatment plan. I understand that this Agreement does not guarantee that I will be prescribed medications or controlled substances. I understand that controlled substances may be just one part 
of my treatment plan. My initial on each page and my signature below shows that I have read each page of this Agreement, I have had an opportunity to ask questions, and all of my questions have been answered to my satisfaction by my provider. By signing below, I agree to comply with this Agreement, and I understand that if I do not follow the Agreements listed above, my provider may stop 
 
 
 
_________________________________________  Date/Time 12/13/2019 2:04 PM   
             (Patient Signature)

## 2019-12-13 NOTE — PROGRESS NOTES
Chief Complaint   Patient presents with    Hypertension     follow up    Insomnia    Labs     completed 8/2/18    Medication Refill         HPI    Chrissy Bruce is a 63 y.[facility]. [facility]. [facility] and was seen at the [facility]. She was dissatisfied with her care and has returned. No recent labs. Patient does need medication refills today. New concerns today: pt c/o recent R thumb pain. She thinks it was injured when interacting with a family member. Pt was concerned about whether her pain could be due to gout. Pt has been stressed recently. A nephew was killed 3 months ago. Additionally, her sister's alcoholism has been causing problems for the family as well. Pt cont to have issues with insomnia. It is controlled with lunesta. Pt needs a refill of lunesta today. Review of Systems   Constitutional: Negative. HENT: Negative. Respiratory: Negative. Cardiovascular: Negative. Psychiatric/Behavioral: The patient is nervous/anxious and has insomnia. All other systems reviewed and are negative. Physical Exam  Physical Exam   Nursing note and vitals reviewed. Constitutional: She is oriented to person, place, and time. She appears well-developed and well-nourished. HENT:   Head: Normocephalic and atraumatic. Right Ear: External ear normal.   Left Ear: External ear normal.   Nose: Nose normal.   Eyes: Conjunctivae and EOM are normal.   Neck: Normal range of motion. Neck supple. No JVD present. Carotid bruit is not present. No thyromegaly present. Cardiovascular: Normal rate, regular rhythm, normal heart sounds and intact distal pulses. Exam reveals no gallop and no friction rub. No murmur heard. Pulmonary/Chest: Effort normal and breath sounds normal. She has no wheezes. She has no rhonchi. She has no rales. Abdominal: Soft. Bowel sounds are normal.   Musculoskeletal: Normal range of motion. Neurological: She is alert and oriented to person, place, and time. Coordination normal.   Skin: Skin is warm and dry. Psychiatric: She has a normal mood and affect. Her behavior is normal. Judgment and thought content normal.     Diagnoses and all orders for this visit:    1. Essential hypertension  -     METABOLIC PANEL, COMPREHENSIVE; Future  -     LIPID PANEL; Future  Stable, cont pres tx plan. 2. Hyperlipidemia, unspecified hyperlipidemia type  -     METABOLIC PANEL, COMPREHENSIVE; Future  -     LIPID PANEL; Future    3. S/P gastric bypass  -     CBC WITH AUTOMATED DIFF; Future  -     TSH 3RD GENERATION; Future  -     VITAMIN D, 25 HYDROXY; Future  -     VITAMIN B12 & FOLATE; Future  -     IRON PROFILE; Future  -     VITAMIN B1, WHOLE BLOOD; Future    4. Iron deficiency anemia, unspecified iron deficiency anemia type  -     CBC WITH AUTOMATED DIFF; Future  -     IRON PROFILE; Future    5. Insomnia, unspecified type  -     eszopiclone (LUNESTA) 3 mg tablet; Take 1 Tab by mouth nightly. Max Daily Amount: 3 mg.  -     COMPLIANCE DRUG SCREEN/PRESCRIPTION MONITORING; Future    6. Medication monitoring encounter  -     COMPLIANCE DRUG SCREEN/PRESCRIPTION MONITORING; Future    7. Chronic pain of right thumb  Pt reassured. Lenora Lia of her pain and rapid recovery seem to speak against gouty arthritis. Follow-up and Dispositions    · Return in about 3 months (around 3/13/2020) for high blood pressure, lab review, high cholesterol, anemia.

## 2019-12-20 LAB — DRUGS UR: NORMAL

## 2019-12-22 LAB
25(OH)D3 SERPL-MCNC: 37.7 NG/ML (ref 32–100)
A-G RATIO,AGRAT: 1.4 RATIO (ref 1.1–2.6)
ABSOLUTE LYMPHOCYTE COUNT, 10803: 2 K/UL (ref 1–4.8)
ALBUMIN SERPL-MCNC: 4 G/DL (ref 3.5–5)
ALP SERPL-CCNC: 72 U/L (ref 25–115)
ALT SERPL-CCNC: 8 U/L (ref 5–40)
ANION GAP SERPL CALC-SCNC: 12.4 MMOL/L
AST SERPL W P-5'-P-CCNC: 23 U/L (ref 10–37)
BASOPHILS # BLD: 0 K/UL (ref 0–0.2)
BASOPHILS NFR BLD: 1 % (ref 0–2)
BILIRUB SERPL-MCNC: 0.3 MG/DL (ref 0.2–1.2)
BUN SERPL-MCNC: 15 MG/DL (ref 6–22)
CALCIUM SERPL-MCNC: 9.2 MG/DL (ref 8.4–10.5)
CHLORIDE SERPL-SCNC: 101 MMOL/L (ref 98–110)
CHOLEST SERPL-MCNC: 243 MG/DL (ref 110–200)
CO2 SERPL-SCNC: 30 MMOL/L (ref 20–32)
CREAT SERPL-MCNC: 0.5 MG/DL (ref 0.5–1.2)
EOSINOPHIL # BLD: 0.1 K/UL (ref 0–0.5)
EOSINOPHIL NFR BLD: 2 % (ref 0–6)
ERYTHROCYTE [DISTWIDTH] IN BLOOD BY AUTOMATED COUNT: 13.5 % (ref 10–15.5)
FE % SATURATION,PSAT: 23 % (ref 20–50)
FERRITIN SERPL-MCNC: 37 NG/ML (ref 10–291)
FOLATE,FOL: >20 NG/ML
GFRAA, 66117: >60
GFRNA, 66118: >60
GLOBULIN,GLOB: 2.8 G/DL (ref 2–4)
GLUCOSE SERPL-MCNC: 87 MG/DL (ref 70–99)
GRANULOCYTES,GRANS: 48 % (ref 40–75)
HCT VFR BLD AUTO: 36.1 % (ref 35.1–48)
HDLC SERPL-MCNC: 106 MG/DL
HDLC SERPL-MCNC: 2.3 MG/DL (ref 0–5)
HGB BLD-MCNC: 11.7 G/DL (ref 11.7–16)
IRON,IRN: 88 MCG/DL (ref 30–160)
LDL/HDL RATIO,LDHD: 1.2
LDLC SERPL CALC-MCNC: 122 MG/DL (ref 50–99)
LYMPHOCYTES, LYMLT: 40 % (ref 20–45)
MCH RBC QN AUTO: 30 PG (ref 26–34)
MCHC RBC AUTO-ENTMCNC: 32 G/DL (ref 31–36)
MCV RBC AUTO: 92 FL (ref 81–99)
MONOCYTES # BLD: 0.5 K/UL (ref 0.1–1)
MONOCYTES NFR BLD: 9 % (ref 3–12)
NEUTROPHILS # BLD AUTO: 2.4 K/UL (ref 1.8–7.7)
NON-HDL CHOLESTEROL, 011976: 137 MG/DL
PLATELET # BLD AUTO: 288 K/UL (ref 140–440)
PMV BLD AUTO: 11.8 FL (ref 9–13)
POTASSIUM SERPL-SCNC: 3.7 MMOL/L (ref 3.5–5.5)
PROT SERPL-MCNC: 6.8 G/DL (ref 6.4–8.3)
RBC # BLD AUTO: 3.94 M/UL (ref 3.8–5.2)
SODIUM SERPL-SCNC: 143 MMOL/L (ref 133–145)
TIBC,TIBC: 376 MCG/DL (ref 228–428)
TRIGL SERPL-MCNC: 73 MG/DL (ref 40–149)
TSH SERPL DL<=0.005 MIU/L-ACNC: 1.34 MCU/ML (ref 0.27–4.2)
UIBC SERPL-MCNC: 288 MCG/DL (ref 110–370)
VIT B12 SERPL-MCNC: 1099 PG/ML (ref 211–911)
VITAMIN B1, WHOLE BLOOD, 66250: 98.3 NMOL/L (ref 66.5–200)
VLDLC SERPL CALC-MCNC: 15 MG/DL (ref 8–30)
WBC # BLD AUTO: 5.1 K/UL (ref 4–11)

## 2020-01-21 DIAGNOSIS — I10 ESSENTIAL HYPERTENSION: ICD-10-CM

## 2020-01-21 NOTE — TELEPHONE ENCOUNTER
Requested Prescriptions     Pending Prescriptions Disp Refills    hydroCHLOROthiazide (HYDRODIURIL) 25 mg tablet 90 Tab 1     Sig: Take 1 Tab by mouth daily.

## 2020-01-21 NOTE — TELEPHONE ENCOUNTER
PCP: Prudence Adkins MD    Last appt: 12/13/2019  Future Appointments   Date Time Provider Vernon Lawson   1/22/2020 11:15 AM MD OZZY Clinton None   3/12/2020 12:45 PM MD OZZY [place] None       Requested Prescriptions     Pending Prescriptions Disp Refills    hydroCHLOROthiazide (HYDRODIURIL) 25 mg tablet 90 Tab 1     Sig: Take 1 Tab by mouth daily.

## 2020-01-22 ENCOUNTER — OFFICE VISIT (OUTPATIENT)
Dept: FAMILY MEDICINE CLINIC | Age: 60
End: 2020-01-22

## 2020-01-22 VITALS
SYSTOLIC BLOOD PRESSURE: 107 MMHG | BODY MASS INDEX: 30 KG/M2 | HEIGHT: 62 IN | DIASTOLIC BLOOD PRESSURE: 76 MMHG | HEART RATE: 102 BPM | RESPIRATION RATE: 18 BRPM | WEIGHT: 163 LBS | TEMPERATURE: 98.2 F

## 2020-01-22 DIAGNOSIS — Z98.84 S/P GASTRIC BYPASS: ICD-10-CM

## 2020-01-22 DIAGNOSIS — E78.5 HYPERLIPIDEMIA, UNSPECIFIED HYPERLIPIDEMIA TYPE: ICD-10-CM

## 2020-01-22 DIAGNOSIS — R05.9 COUGH: ICD-10-CM

## 2020-01-22 DIAGNOSIS — J11.00 INFLUENZA AND PNEUMONIA: Primary | ICD-10-CM

## 2020-01-22 DIAGNOSIS — I10 ESSENTIAL HYPERTENSION: ICD-10-CM

## 2020-01-22 DIAGNOSIS — R52 BODY ACHES: ICD-10-CM

## 2020-01-22 DIAGNOSIS — D50.9 IRON DEFICIENCY ANEMIA, UNSPECIFIED IRON DEFICIENCY ANEMIA TYPE: ICD-10-CM

## 2020-01-22 LAB
QUICKVUE INFLUENZA TEST: POSITIVE
VALID INTERNAL CONTROL?: YES

## 2020-01-22 RX ORDER — AZITHROMYCIN 250 MG/1
TABLET, FILM COATED ORAL
COMMUNITY
Start: 2020-01-20 | End: 2020-03-11 | Stop reason: ALTCHOICE

## 2020-01-22 RX ORDER — ALBUTEROL SULFATE 90 UG/1
2 AEROSOL, METERED RESPIRATORY (INHALATION)
Qty: 1 INHALER | Refills: 0 | Status: SHIPPED | OUTPATIENT
Start: 2020-01-22 | End: 2022-04-26 | Stop reason: SDUPTHER

## 2020-01-22 RX ORDER — BENZONATATE 100 MG/1
CAPSULE ORAL
COMMUNITY
Start: 2020-01-20 | End: 2020-03-11 | Stop reason: ALTCHOICE

## 2020-01-22 RX ORDER — VENLAFAXINE HYDROCHLORIDE 75 MG/1
CAPSULE, EXTENDED RELEASE ORAL
COMMUNITY
Start: 2019-12-23 | End: 2020-03-11 | Stop reason: SDUPTHER

## 2020-01-22 RX ORDER — OSELTAMIVIR PHOSPHATE 75 MG/1
75 CAPSULE ORAL 2 TIMES DAILY
Qty: 10 CAP | Refills: 0 | Status: SHIPPED | OUTPATIENT
Start: 2020-01-22 | End: 2020-01-27

## 2020-01-22 RX ORDER — AMOXICILLIN AND CLAVULANATE POTASSIUM 562.5; 437.5; 62.5 MG/1; MG/1; MG/1
TABLET, FILM COATED, EXTENDED RELEASE ORAL
COMMUNITY
Start: 2020-01-20 | End: 2020-03-11 | Stop reason: ALTCHOICE

## 2020-01-22 RX ORDER — HYDROCHLOROTHIAZIDE 25 MG/1
25 TABLET ORAL DAILY
Qty: 90 TAB | Refills: 1 | Status: SHIPPED | OUTPATIENT
Start: 2020-01-22 | End: 2020-07-23 | Stop reason: SDUPTHER

## 2020-01-22 NOTE — PROGRESS NOTES
Chief Complaint   Patient presents with   [facility] Follow Up     [facility] for Pneumonia. HISTORY OF PRESENT ILLNESS  Favian Major is a 63 y.o. female. HPI  Pt here for f/u of ER visit on 1/20/20 for pna. She has been around a coworker who had the flu. When she began to have a cough, ha, ear pain, chest discomfort, she thought it was the flu. Pt had a cxr that showed LLL pna. She notes that she did have pain in her L side/back last week. Pt was prescribed azith, augmentin, and tessalon. She does not feel the tessalon is helping at this point. Pt is concerned that she may also have the flu due to the discomfort she has and the fact that other coworkers have now been dx with pna as well. Review of Systems   Constitutional: Positive for chills and malaise/fatigue. HENT: Positive for sore throat. Negative for congestion. Respiratory: Positive for cough. Cardiovascular: Positive for chest pain. Musculoskeletal: Positive for myalgias. All other systems reviewed and are negative. Physical Exam  Vitals signs and nursing note reviewed. Constitutional:       General: She is not in acute distress. Appearance: Normal appearance. She is ill-appearing. HENT:      Head: Normocephalic and atraumatic. Right Ear: Tympanic membrane, ear canal and external ear normal.      Left Ear: Tympanic membrane, ear canal and external ear normal.      Nose: Mucosal edema present. Right Sinus: No maxillary sinus tenderness or frontal sinus tenderness. Left Sinus: No maxillary sinus tenderness or frontal sinus tenderness. Mouth/Throat:      Lips: Pink. Mouth: Mucous membranes are moist.   Eyes:      Extraocular Movements: Extraocular movements intact. Conjunctiva/sclera: Conjunctivae normal.   Neck:      Musculoskeletal: Normal range of motion. Cardiovascular:      Rate and Rhythm: Normal rate and regular rhythm. Heart sounds:  No murmur. No friction rub. No gallop. Pulmonary:      Effort: Pulmonary effort is normal. No respiratory distress. Breath sounds: Wheezing present. No rhonchi or rales. Musculoskeletal: Normal range of motion. Skin:     General: Skin is warm and dry. Neurological:      Mental Status: She is alert and oriented to person, place, and time. Coordination: Coordination normal.   Psychiatric:         Mood and Affect: Mood normal.         Behavior: Behavior normal.         Thought Content: Thought content normal.         Judgment: Judgment normal.       Results for Lazarus Aden (MRN [id number]) as of 1/27/2020 22:19   Ref. Range 1/22/2020 12:11   [employer] Influenza test Latest Ref Range: Negative  Positive     ASSESSMENT and PLAN  Diagnoses and all orders for this visit:    1. Influenza and pneumonia  -     oseltamivir (TAMIFLU) 75 mg capsule; Take 1 Cap by mouth two (2) times a day for 5 days. -     XR CHEST PA LAT; Future    2. Body aches  -     AMB POC RAPID INFLUENZA TEST    3. Cough  -     albuterol (PROVENTIL HFA, VENTOLIN HFA, PROAIR HFA) 90 mcg/actuation inhaler; Take 2 Puffs by inhalation every four (4) hours as needed for Wheezing. Follow-up and Dispositions    · Return if symptoms worsen or fail to improve.

## 2020-01-22 NOTE — PROGRESS NOTES
Balwinder Caal presents today for   Chief Complaint   Patient presents with   [address] Follow Up     1/20/20 [facility] [place] [facility] for Pneumonia. Balwinder Caal preferred language for health care discussion is english/other. Is someone accompanying this pt? no    Is the patient using any DME equipment during [address]? no    Depression Screening:  3 most recent PHQ Screens 1/22/2020   Little interest or pleasure in doing things Not at all   Feeling down, depressed, irritable, or hopeless Not at all   Total Score PHQ 2 0       Learning Assessment:  Learning Assessment 1/22/2020   PRIMARY LEARNER Patient   HIGHEST LEVEL OF EDUCATION - PRIMARY LEARNER  SOME COLLEGE   BARRIERS PRIMARY LEARNER NONE   CO-LEARNER CAREGIVER No   PRIMARY LANGUAGE ENGLISH   LEARNER PREFERENCE PRIMARY LISTENING     -     -     -     -   ANSWERED BY self   RELATIONSHIP SELF       Abuse Screening:  Abuse Screening Questionnaire 1/22/2020   Do you ever feel afraid of your partner? N   Are you in a relationship with someone who physically or mentally threatens you? N   Is it safe for you to go home? Y       Generalized Anxiety  No flowsheet data found. Health Maintenance Due   Topic Date Due    DTaP/Tdap/Td series (1 - Tdap) 10/13/1972    Shingrix Vaccine Age 50> (1 of 2) 10/13/2011    BREAST CANCER SCRN MAMMOGRAM  01/25/2019    Influenza Age 9 to Adult  08/01/2019   . Health Maintenance reviewed and discussed and ordered per Provider. Balwinder Caal is updated on all     Coordination of Care:  1. Have you been to the ER, urgent care clinic since your last visit? Hospitalized since your last visit? Yes 1/20/20 [facility] [place] [facility] for Pneumonia. 2. Have you seen or consulted any other health care providers outside of the [address] [place] [address] since your last visit? Include any pap smears or colon screening. no      Advance Directive:  1. Do you have an advance directive in place?  Patient Reply:no    2. If not, would you like material regarding how to put one in place?  Patient Reply: no

## 2020-01-22 NOTE — LETTER
NOTIFICATION OF RETURN TO WORK / SCHOOL 
 
1/22/2020 12:22 PM 
 
Ms. Jose Ortiz 2018 [address] [facility] To Whom It May Concern: 
 
Jose Ortiz was under the care of Rishabh Cornelius from 1/22/2020 to 1/24/2020. She will be able to return to work/school on 1/27/2020 with no restrictions. If there are questions or concerns please have the patient contact our office. Sincerely, Regina Tavera MD

## 2020-02-11 NOTE — TELEPHONE ENCOUNTER
PCP: Pavan Da Silva MD    Last appt: 1/22/2020  Future Appointments   Date Time Provider Vernon Lawson   3/12/2020 12:45 PM Pavan Da Silva MD [facility] None       Requested Prescriptions     Pending Prescriptions Disp Refills    PREMARIN 0.3 mg tablet       Sig: Take 1 Tab by mouth.     venlafaxine-SR (EFFEXOR-XR) 75 mg capsule

## 2020-02-14 NOTE — TELEPHONE ENCOUNTER
I have attempted without success to contact this patient by phone to I left a message stating to call the office with correct dosage on edication.

## 2020-02-19 RX ORDER — VENLAFAXINE HYDROCHLORIDE 75 MG/1
CAPSULE, EXTENDED RELEASE ORAL
OUTPATIENT
Start: 2020-02-19

## 2020-02-19 RX ORDER — ESTROGENS, CONJUGATED 0.3 MG/1
0.3 TABLET, FILM COATED ORAL DAILY
Qty: 90 TAB | Refills: 1 | Status: SHIPPED | OUTPATIENT
Start: 2020-02-19 | End: 2020-03-11 | Stop reason: ALTCHOICE

## 2020-02-19 NOTE — TELEPHONE ENCOUNTER
Pt called and stated that the correct dosage is 0.3mg and she is completely out of medication, pt stated that she needs a 30 day sent to [facility] on [address] in [place] and would like the 90 day to be sent to [facility] in [place].

## 2020-03-04 DIAGNOSIS — I10 ESSENTIAL HYPERTENSION: ICD-10-CM

## 2020-03-04 NOTE — TELEPHONE ENCOUNTER
Pt called requesting refill for medication . Requested Prescriptions     Pending Prescriptions Disp Refills    telmisartan (MICARDIS) 80 mg tablet 90 Tab 3     Sig: Take 1 Tab by mouth daily.

## 2020-03-05 NOTE — TELEPHONE ENCOUNTER
PCP: Eduarda Glaser MD    Last appt: 1/22/2020  Future Appointments   Date Time Provider Vernon Lawson   4/23/2020 10:45 AM Eduarda Glaser MD [facility] None       Requested Prescriptions     Pending Prescriptions Disp Refills    telmisartan (MICARDIS) 80 mg tablet 90 Tab 3     Sig: Take 1 Tab by mouth daily.

## 2020-03-07 ENCOUNTER — HOSPITAL ENCOUNTER (OUTPATIENT)
Dept: GENERAL RADIOLOGY | Age: 60
Discharge: HOME | End: 2020-03-07
Payer: OTHER GOVERNMENT

## 2020-03-07 DIAGNOSIS — J11.00 INFLUENZA AND PNEUMONIA: ICD-10-CM

## 2020-03-07 PROCEDURE — 71046 X-RAY EXAM CHEST 2 VIEWS: CPT

## 2020-03-08 RX ORDER — TELMISARTAN 80 MG/1
80 TABLET ORAL DAILY
Qty: 90 TAB | Refills: 3 | Status: SHIPPED | OUTPATIENT
Start: 2020-03-08 | End: 2020-08-27 | Stop reason: SDUPTHER

## 2020-03-11 ENCOUNTER — OFFICE VISIT (OUTPATIENT)
Dept: FAMILY MEDICINE CLINIC | Age: 60
End: 2020-03-11

## 2020-03-11 VITALS
HEART RATE: 78 BPM | TEMPERATURE: 98.2 F | HEIGHT: 62 IN | RESPIRATION RATE: 18 BRPM | SYSTOLIC BLOOD PRESSURE: 115 MMHG | WEIGHT: 162.2 LBS | DIASTOLIC BLOOD PRESSURE: 78 MMHG | BODY MASS INDEX: 29.85 KG/M2

## 2020-03-11 DIAGNOSIS — G47.00 INSOMNIA, UNSPECIFIED TYPE: ICD-10-CM

## 2020-03-11 DIAGNOSIS — N95.1 HOT FLASHES DUE TO MENOPAUSE: ICD-10-CM

## 2020-03-11 DIAGNOSIS — M25.562 ACUTE PAIN OF LEFT KNEE: ICD-10-CM

## 2020-03-11 DIAGNOSIS — R10.2 PELVIC PAIN IN FEMALE: Primary | ICD-10-CM

## 2020-03-11 RX ORDER — ESZOPICLONE 3 MG/1
3 TABLET, FILM COATED ORAL
Qty: 90 TAB | Refills: 1 | Status: SHIPPED | OUTPATIENT
Start: 2020-03-11 | End: 2020-08-23 | Stop reason: SDUPTHER

## 2020-03-11 RX ORDER — VENLAFAXINE HYDROCHLORIDE 75 MG/1
75 CAPSULE, EXTENDED RELEASE ORAL DAILY
Qty: 90 CAP | Refills: 1 | Status: SHIPPED | OUTPATIENT
Start: 2020-03-11 | End: 2020-06-24 | Stop reason: SDUPTHER

## 2020-03-11 NOTE — PROGRESS NOTES
Chief Complaint   Patient presents with    Abdominal Pain     Patient stated that she been having lower left abdomen pain x 1 week. Patient stated that her pain is 4/10.  Leg Pain     Patient stated that she had fallen on left knee and now have left knee pain that travel to left thigh x 2 weeks.  Medication Refill     HISTORY OF PRESENT ILLNESS  Jazmine Bryant is a 63 y.o. female. HPI  Pt here for eval of LLQ abd pain. She has had this pain for 1 wk. She was concerned that it could be due to incomplete stooling. She took a laxative and has been moving her bowels. The stool is dark in color. She has pain in certain positions, it hurts when she walks and when she gets up out of the bed. Pt has hx of hyst but does still have her ovaries. The pain is not alleviated by tylenol. Pt reports that she fell 2 wks ago. She was walking on the sidewalk when her foot got caught in a crack and fell onto the L knee. She did scrape the knee just above the patellar tendon. However, she has had ongoing pain at the top of the tibia. She had pain in the lower leg as well as pain that would travel up the inner thigh. Pt needs med refills today. Review of Systems   Constitutional: Negative. HENT: Negative. Respiratory: Negative. Cardiovascular: Negative. Gastrointestinal: Positive for abdominal pain. Negative for diarrhea, nausea and vomiting. Musculoskeletal: Positive for joint pain. All other systems reviewed and are negative. Physical Exam  Vitals signs and nursing note reviewed. Constitutional:       General: She is not in acute distress. Appearance: Normal appearance. She is not ill-appearing. HENT:      Head: Normocephalic and atraumatic. Right Ear: External ear normal.      Left Ear: External ear normal.      Nose: Nose normal.   Eyes:      Extraocular Movements: Extraocular movements intact.       Conjunctiva/sclera: Conjunctivae normal.   Neck: Musculoskeletal: Normal range of motion. Cardiovascular:      Rate and Rhythm: Normal rate and regular rhythm. Heart sounds: No murmur. No friction rub. No gallop. Pulmonary:      Effort: Pulmonary effort is normal.      Breath sounds: Normal breath sounds. No wheezing, rhonchi or rales. Abdominal:      General: Bowel sounds are normal. There is no distension. Palpations: Abdomen is soft. There is no mass. Tenderness: There is no abdominal tenderness. Musculoskeletal: Normal range of motion. Left knee: She exhibits bony tenderness. She exhibits no swelling, no ecchymosis and normal alignment. No medial joint line, no lateral joint line, no MCL, no LCL and no patellar tendon tenderness noted. Skin:     General: Skin is warm and dry. Neurological:      Mental Status: She is alert and oriented to person, place, and time. Coordination: Coordination normal.   Psychiatric:         Mood and Affect: Mood normal.         Behavior: Behavior normal.         Thought Content: Thought content normal.         Judgment: Judgment normal.       ASSESSMENT and PLAN  Diagnoses and all orders for this visit:    1. Pelvic pain in female  -     [address]; Future    2. Insomnia, unspecified type  -     eszopiclone (LUNESTA) 3 mg tablet; Take 1 Tab by mouth nightly. Max Daily Amount: 3 mg.    3. Acute pain of left knee  Pt reassured. Likelihood of fx low due to location of ttp. Cont otc analgesia prn.    4. Hot flashes due to menopause  -     venlafaxine-SR (EFFEXOR-XR) 75 mg capsule; Take 1 Cap by mouth daily. Follow-up and Dispositions    · Return if symptoms worsen or fail to improve.

## 2020-03-11 NOTE — PROGRESS NOTES
Hanover Trinidad presents today for   Chief Complaint   Patient presents with    Abdominal Pain     Patient stated that she been having lower left abdomen pain x 1 week. Patient stated that her pain is 4/10.  Leg Pain     Patient stated that she had fallen on left knee and now have left knee pain that travel to left thigh x 2 weeks. Wyatt Espositomond preferred language for health care discussion is english/other. Is someone accompanying this pt? no    Is the patient using any DME equipment during [address]? no    Depression Screening:  3 most recent PHQ Screens 1/22/2020   Little interest or pleasure in doing things Not at all   Feeling down, depressed, irritable, or hopeless Not at all   Total Score PHQ 2 0       Learning Assessment:  Learning Assessment 1/22/2020   PRIMARY LEARNER Patient   HIGHEST LEVEL OF EDUCATION - PRIMARY LEARNER  SOME COLLEGE   BARRIERS PRIMARY LEARNER NONE   CO-LEARNER CAREGIVER No   PRIMARY LANGUAGE ENGLISH   LEARNER PREFERENCE PRIMARY LISTENING     -     -     -     -   ANSWERED BY self   RELATIONSHIP SELF       Abuse Screening:  Abuse Screening Questionnaire 1/22/2020   Do you ever feel afraid of your partner? N   Are you in a relationship with someone who physically or mentally threatens you? N   Is it safe for you to go home? Y       Generalized Anxiety  No flowsheet data found. Health Maintenance Due   Topic Date Due    DTaP/Tdap/Td series (1 - Tdap) 10/13/1982    Shingrix Vaccine Age 50> (1 of 2) 10/13/2011    Influenza Age 5 to Adult  08/01/2019    Breast Cancer Screen Mammogram  03/14/2020   . Health Maintenance reviewed and discussed and ordered per Provider. Wyatt Amos is updated on all     Coordination of Care:  1. Have you been to the ER, urgent care clinic since your last visit? Hospitalized since your last visit? no    2. Have you seen or consulted any other health care providers outside of the [address] [place] [address] since your last visit? Include any pap smears or colon screening. no      Advance Directive:  1. Do you have an advance directive in place? Patient Reply:no    2. If not, would you like material regarding how to put one in place?  Patient Reply: no

## 2020-03-12 NOTE — PROGRESS NOTES
Called patient and chart review was done. Patient was advised per- Dr. Krystina Agustin that her chest x-ray was normal. Patient stated that is great.

## 2020-03-27 DIAGNOSIS — J30.89 OTHER ALLERGIC RHINITIS: ICD-10-CM

## 2020-03-29 RX ORDER — CETIRIZINE HCL 10 MG
10 TABLET ORAL DAILY
Qty: 90 TAB | Refills: 1 | Status: SHIPPED | OUTPATIENT
Start: 2020-03-29 | End: 2020-11-19 | Stop reason: SDUPTHER

## 2020-04-03 DIAGNOSIS — R10.2 PELVIC PAIN IN FEMALE: ICD-10-CM

## 2020-04-06 RX ORDER — ESOMEPRAZOLE MAGNESIUM 40 MG/1
CAPSULE, DELAYED RELEASE ORAL
Qty: 90 CAP | Refills: 3 | Status: SHIPPED | OUTPATIENT
Start: 2020-04-06 | End: 2021-03-01

## 2020-04-15 ENCOUNTER — VIRTUAL VISIT (OUTPATIENT)
Dept: FAMILY MEDICINE CLINIC | Age: 60
End: 2020-04-15

## 2020-04-15 DIAGNOSIS — R51.9 SINUS HEADACHE: ICD-10-CM

## 2020-04-15 DIAGNOSIS — J01.00 ACUTE NON-RECURRENT MAXILLARY SINUSITIS: Primary | ICD-10-CM

## 2020-04-15 DIAGNOSIS — R05.9 COUGH: ICD-10-CM

## 2020-04-15 RX ORDER — BENZONATATE 100 MG/1
100 CAPSULE ORAL
Qty: 21 CAP | Refills: 0 | Status: SHIPPED | OUTPATIENT
Start: 2020-04-15 | End: 2020-04-22

## 2020-04-15 RX ORDER — AZELASTINE 1 MG/ML
1 SPRAY, METERED NASAL 2 TIMES DAILY
Qty: 1 BOTTLE | Refills: 0 | Status: SHIPPED | OUTPATIENT
Start: 2020-04-15 | End: 2020-11-21 | Stop reason: SDUPTHER

## 2020-04-15 RX ORDER — AMOXICILLIN AND CLAVULANATE POTASSIUM 875; 125 MG/1; MG/1
1 TABLET, FILM COATED ORAL EVERY 12 HOURS
Qty: 14 TAB | Refills: 0 | Status: SHIPPED | OUTPATIENT
Start: 2020-04-15 | End: 2020-04-22

## 2020-04-15 NOTE — LETTER
NOTIFICATION RETURN TO WORK / SCHOOL    4/15/2020 12:18 PM    Ms. Gregorio Almodovar  [address] [facility] [address]      To Whom It May Concern:    Gregorio Almodovar is currently under the care of Rishabh Cornelius. She will return to work/school on: 4/22/2020    If there are questions or concerns please have the patient contact our office.         Sincerely,      [place] NP

## 2020-04-15 NOTE — PROGRESS NOTES
Cindy Ellis presents today for   Chief Complaint   Patient presents with    Sinus Infection     coughing, sneezing, sinus pressure, stuffy nose. Sx started 1 week ago       Cindy Ellis preferred language for health care discussion is english/other. Is someone accompanying this pt? no    Is the patient using any DME equipment during [address]? no    Depression Screening:  3 most recent PHQ Screens 1/22/2020   Little interest or pleasure in doing things Not at all   Feeling down, depressed, irritable, or hopeless Not at all   Total Score PHQ 2 0       Learning Assessment:  Learning Assessment 1/22/2020   PRIMARY LEARNER Patient   HIGHEST LEVEL OF EDUCATION - PRIMARY LEARNER  SOME COLLEGE   BARRIERS PRIMARY LEARNER NONE   CO-LEARNER CAREGIVER No   PRIMARY LANGUAGE ENGLISH   LEARNER PREFERENCE PRIMARY LISTENING     -     -     -     -   ANSWERED BY self   RELATIONSHIP SELF       Abuse Screening:  Abuse Screening Questionnaire 1/22/2020   Do you ever feel afraid of your partner? N   Are you in a relationship with someone who physically or mentally threatens you? N   Is it safe for you to go home? Y       Generalized Anxiety  No flowsheet data found. Health Maintenance Due   Topic Date Due    DTaP/Tdap/Td series (1 - Tdap) 10/13/1982    Shingrix Vaccine Age 50> (1 of 2) 10/13/2011   . Health Maintenance reviewed and discussed and ordered per Provider. Coordination of Care:  1. Have you been to the ER, urgent care clinic since your last visit? Hospitalized since your last visit? no    2. Have you seen or consulted any other health care providers outside of the [address] [place] [address] since your last visit? Include any pap smears or colon screening.  no      Advance Directive:  Discussed 3/11/20

## 2020-04-15 NOTE — PROGRESS NOTES
Consent: Eugenia Rogers, who was seen by synchronous (real-time) audio-video technology, and/or her healthcare decision maker, is aware that this patient-initiated, Telehealth encounter on 4/15/2020 is a billable service, with coverage as determined by her insurance carrier. She is aware that she may receive a bill and has provided verbal consent to proceed: Yes. Patient verified name, , and address. Assessment & Plan:   Diagnoses and all orders for this visit:    1. Acute non-recurrent maxillary sinusitis  -     amoxicillin-clavulanate (AUGMENTIN) 875-125 mg per tablet; Take 1 Tab by mouth every twelve (12) hours for 7 days. -     azelastine (ASTELIN) 137 mcg (0.1 %) nasal spray; 1 [place] by Both Nostrils route two (2) times a day. Use in each nostril as directed    2. Cough  -     benzonatate (TESSALON) 100 mg capsule; Take 1 Cap by mouth three (3) times daily as needed for Cough for up to 7 days. 3. Sinus headache        Medication, side effects, possible allergic reactions and warnings reviewed with patient. Patient verbalized understanding. OTC tylenol for sinus and headache. Follow-up and Dispositions    · Return if symptoms worsen or fail to improve, for sinusitis, cough, headache. Social isolatoin  Letter for work given. Home for one week. I spent at least 25 minutes with this established patient, and >50% of the time was spent counseling and/or coordinating care regarding sinusitis, cough, headache, medications, social isolatoin, when to seek emergent or urgent care if symptoms worsent  712  Subjective:   Eugenia Rogers is a 63 y.o. female who was seen for Sinus Infection (coughing, sneezing, sinus pressure, stuffy nose. Sx started 1 week ago)  Reports infection for about a week and a half. She has used Zyrtec and tried to use the Foodist. She has used the Astelin. She reports sinus pressure and her eyes are constantly watering.  She reports pressure under her cheek bones and her teeth ar hurting. She has had some chills. She has yellow mucus with her cough and the cough is mainly at night. She reports her cough is worse at night. She does not feel anything is working. She denies any fevers. Reports her nose is stuffy. 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 She does have to pull her mask down as she is not able to breath. She reports she did have pneumonia a few months ago and was giving an inhaler. She uses this with her shortness of breath and her wheezing and this does help. She denies fevers. Prior to Admission medications    Medication Sig Start Date End Date Taking? Authorizing Provider   amoxicillin-clavulanate (AUGMENTIN) 875-125 mg per tablet Take 1 Tab by mouth every twelve (12) hours for 7 days. 4/15/20 4/22/20 Yes Beauty Comfort B, NP   azelastine (ASTELIN) 137 mcg (0.1 %) nasal spray 1 [place] by Both Nostrils route two (2) times a day. Use in each nostril as directed 4/15/20  Yes Beauty Comfort B, STEFFANY   benzonatate (TESSALON) 100 mg capsule Take 1 Cap by mouth three (3) times daily as needed for Cough for up to 7 days. 4/15/20 4/22/20 Yes Beauty Comfort B, NP   esomeprazole (NEXIUM) 40 mg capsule TAKE 1 CAPSULE DAILY 4/6/20  Yes Geeta Pablo MD   cetirizine (ZYRTEC) 10 mg tablet Take 1 Tab by mouth daily. 3/29/20  Yes Geeta Pablo MD   eszopiclone (LUNESTA) 3 mg tablet Take 1 Tab by mouth nightly. Max Daily Amount: 3 mg. 3/11/20  Yes Geeta Pablo MD   venlafaxine-SR [facility] 75 mg capsule Take 1 Cap by mouth daily. 3/11/20  Yes Geeta Pablo MD   telmisartan (MICARDIS) 80 mg tablet Take 1 Tab by mouth daily. 3/8/20  Yes Geeta Pablo MD   conjugated estrogens (PREMARIN) 0.3 mg tablet Take 1 Tab by mouth daily. 2/19/20  Yes Geeta Pablo MD   hydroCHLOROthiazide (HYDRODIURIL) 25 mg tablet Take 1 Tab by mouth daily.  1/22/20  Yes Janet Rodriguez MD   albuterol (PROVENTIL HFA, VENTOLIN HFA, PROAIR HFA) 90 mcg/actuation inhaler Take 2 Puffs by inhalation every four (4) hours as needed for Wheezing. 1/22/20  Yes Jonathan Rodriguez MD   amLODIPine (NORVASC) 10 mg tablet Take 1 Tab by mouth daily. 7/12/18  Yes Vashti Franklin MD   fluticasone (FLONASE) 50 mcg/actuation nasal spray 2 Sprays by Both Nostrils route daily. 6/19/14  Yes Jonathan Rodriguez MD   PEDIATRIC MULTIVIT COMB #19/FA (CHILDREN'S MULTI-VIT GUMMIES PO) Take  by mouth two (2) times a day. Yes Provider, Historical   calcium carbonate (CALTREX) 600 mg (1,500 mg) tablet Take 600 mg by mouth daily. Yes Provider, Historical   Vitamin B Complex-Vit B12 1,200 mcg/mL Drop by SubLINGual route daily. Yes Provider, Historical     Allergies   Allergen Reactions    Tioconazole Itching       Patient Active Problem List   Diagnosis Code    Essential hypertension I10    [facility]erebral [facility] I63.30    Hyperlipidemia E78.5    AR (allergic rhinitis) J30.9    GERD (gastroesophageal reflux disease) K21.9    S/P gastric bypass Z98.84    Thrombocytosis (HCC) D47.3     Patient Active Problem List    Diagnosis Date Noted    Thrombocytosis ([facility] 12/30/2013     Priority: 1 - One    S/P gastric bypass 08/14/2012    GERD (gastroesophageal reflux disease) 07/05/2011    Hyperlipidemia     AR (allergic rhinitis)     Essential hypertension 10/07/2010    Cerebral thrombosis with cerebral infarction ([facility] 10/07/2010     Current Outpatient Medications   Medication Sig Dispense Refill    amoxicillin-clavulanate (AUGMENTIN) 875-125 mg per tablet Take 1 Tab by mouth every twelve (12) hours for 7 days. 14 Tab 0    azelastine (ASTELIN) 137 mcg (0.1 %) nasal spray 1 [place] by Both Nostrils route two (2) times a day. Use in each nostril as directed 1 Bottle 0    benzonatate (TESSALON) 100 mg capsule Take 1 Cap by mouth three (3) times daily as needed for Cough for up to 7 days.  21 Cap 0    esomeprazole (NEXIUM) 40 mg capsule TAKE 1 CAPSULE DAILY 90 Cap 3    cetirizine (ZYRTEC) 10 mg tablet Take 1 Tab by mouth daily. 90 Tab 1    eszopiclone (LUNESTA) 3 mg tablet Take 1 Tab by mouth nightly. Max Daily Amount: 3 mg. 90 Tab 1    venlafaxine-SR (EFFEXOR-XR) 75 mg capsule Take 1 Cap by mouth daily. 90 Cap 1    telmisartan (MICARDIS) 80 mg tablet Take 1 Tab by mouth daily. 90 Tab 3    conjugated estrogens (PREMARIN) 0.3 mg tablet Take 1 Tab by mouth daily. 30 Tab 1    hydroCHLOROthiazide (HYDRODIURIL) 25 mg tablet Take 1 Tab by mouth daily. 90 Tab 1    albuterol (PROVENTIL HFA, VENTOLIN HFA, PROAIR HFA) 90 mcg/actuation inhaler Take 2 Puffs by inhalation every four (4) hours as needed for Wheezing. 1 Inhaler 0    amLODIPine (NORVASC) 10 mg tablet Take 1 Tab by mouth daily. 90 Tab 3    fluticasone (FLONASE) 50 mcg/actuation nasal spray 2 Sprays by Both Nostrils route daily. 3 Bottle 3    PEDIATRIC MULTIVIT COMB #19/FA (CHILDREN'S MULTI-VIT GUMMIES PO) Take  by mouth two (2) times a day.  calcium carbonate (CALTREX) 600 mg (1,500 mg) tablet Take 600 mg by mouth daily.  Vitamin B Complex-Vit B12 1,200 mcg/mL Drop by SubLINGual route daily.          Allergies   Allergen Reactions    Tioconazole Itching     Past Medical History:   Diagnosis Date    AR (allergic rhinitis)     Cerebral thrombosis with cerebral infarction ([facility] 9/10    Depression     GERD (gastroesophageal reflux disease) 7/5/2011    HTN (hypertension)     Hyperlipidemia     Ruptured appendix     Uterine prolapse      Past Surgical History:   Procedure Laterality Date    HX APPENDECT[facility]  11/2/13    [facility]    HX BREAST REDUCTION  2002    HX GASTRIC BYPASS  4/16/12    HX HAMMER TOE REPAIR  1986    right 5th toe    HX TOTAL ABDOMINAL HYSTERECTOMY  2004    LAP,CHOLECYSTECTOMY  2002    REPR VAGINAL PROLAPSE,UTEROSACRAL  05/29/2018    [facility]     Family History   Problem Relation Age of Onset    Hypertension Mother     Heart Attack Mother     Arthritis-osteo Father     Cancer Father         prostate    Hypertension Father     Stroke Father         CVA    Heart Attack Maternal Grandmother         MI    Osteoporosis Maternal Grandfather         Alzheimer's Disease    Other Paternal Grandmother     Heart Attack Paternal Grandfather         MI    Other Other         1 sib complications from Gastric Bypass    Hypertension Other         1 sib    Stroke Brother      Social History     Tobacco Use    Smoking status: Former Smoker     Packs/day: 0.50     Years: 15.00     Pack years: 7.50     Types: Cigarettes     Last attempt to quit: 2002     Years since quittin.2    Smokeless tobacco: Never Used   Substance Use Topics    Alcohol use: Yes     Comment: occassional wine       Review of Systems   Constitutional: Positive for chills. Negative for fever. HENT: Positive for congestion and sinus pain. Negative for ear pain and sore throat. Respiratory: Positive for cough, sputum production and shortness of breath. Cardiovascular: Negative for chest pain. Endo/Heme/Allergies: Positive for environmental allergies.            Objective:   Vital Signs: (As obtained by patient/caregiver at home)  Visit Vitals  LMP 1996        Constitutional: [x] Appears well-developed and well-nourished [x] No apparent distress      [] Abnormal -     Mental status: [x] Alert and awake  [x] Oriented to person/place/time [x] Able to follow commands    [] Abnormal -     Eyes:   EOM    [x]  Normal    [] Abnormal -   Sclera  [x]  Normal    [] Abnormal -          Discharge [x]  None visible   [] Abnormal -     HENT: [x] Normocephalic, atraumatic  [] Abnormal -   [x] Mouth/Throat: Mucous membranes are moist  Tenderness noted when patient pressed over maxillary sinus area  External Ears [x] Normal  [] Abnormal -    Neck: [x] No visualized mass [] Abnormal -     Pulmonary/Chest: [x] Respiratory effort normal   [x] No visualized signs of difficulty breathing or respiratory distress        [] Abnormal -      Musculoskeletal:   [x] Normal gait with no signs of ataxia         [x] Normal range of motion of neck        [] Abnormal -     Neurological:        [x] No Facial Asymmetry (Cranial nerve 7 motor function) (limited exam due to video visit)          [x] No gaze palsy        [] Abnormal -          Skin:        [x] No significant exanthematous lesions or discoloration noted on facial skin         [] Abnormal -            Psychiatric:       [x] Normal Affect [] Abnormal -        [x] No Hallucinations    We discussed the expected course, resolution and complications of the diagnosis(es) in detail. Medication risks, benefits, costs, interactions, and alternatives were discussed as indicated. I advised her to contact the office if her condition worsens, changes or fails to improve as anticipated. She expressed understanding with the diagnosis(es) and plan. Giorgio Ayala is a 63 y.o. female being evaluated by a video visit encounter for concerns as above. A caregiver was present when appropriate. Due to this being a TeleHealth encounter (During ONCER-86 public health emergency), evaluation of the following organ systems was limited: Vitals/Constitutional/EENT/Resp/CV/GI//MS/Neuro/Skin/Heme-Lymph-Imm. Pursuant to the emergency declaration under the [facility] waiver authority and the [employer] and [employer] Act, this Virtual  Visit was conducted, with patient's (and/or legal guardian's) consent, to reduce the patient's risk of exposure to COVID-19 and provide necessary medical care. Services were provided through a video synchronous discussion virtually to substitute for in-person clinic visit. Patient and provider were located at their individual homes.         Joaquin Nance NP

## 2020-05-04 ENCOUNTER — TELEPHONE (OUTPATIENT)
Dept: FAMILY MEDICINE CLINIC | Age: 60
End: 2020-05-04

## 2020-05-04 NOTE — TELEPHONE ENCOUNTER
Pt called and stated that she was prescribed and antibiotic and feels that she now has a yeast infection, pt stated that everything else is fine. Please advise.

## 2020-05-06 ENCOUNTER — TELEPHONE (OUTPATIENT)
Dept: FAMILY MEDICINE CLINIC | Age: 60
End: 2020-05-06

## 2020-05-06 RX ORDER — FLUCONAZOLE 150 MG/1
150 TABLET ORAL DAILY
Qty: 1 TAB | Refills: 0 | Status: SHIPPED | OUTPATIENT
Start: 2020-05-06 | End: 2020-05-07

## 2020-06-24 DIAGNOSIS — N95.1 HOT FLASHES DUE TO MENOPAUSE: ICD-10-CM

## 2020-06-26 RX ORDER — VENLAFAXINE HYDROCHLORIDE 75 MG/1
75 CAPSULE, EXTENDED RELEASE ORAL DAILY
Qty: 90 CAP | Refills: 1 | Status: SHIPPED | OUTPATIENT
Start: 2020-06-26 | End: 2020-07-23 | Stop reason: DRUGHIGH

## 2020-06-29 DIAGNOSIS — I10 ESSENTIAL HYPERTENSION: ICD-10-CM

## 2020-07-06 RX ORDER — AMLODIPINE BESYLATE 10 MG/1
10 TABLET ORAL DAILY
Qty: 90 TAB | Refills: 1 | Status: SHIPPED | OUTPATIENT
Start: 2020-07-06 | End: 2020-12-17 | Stop reason: SDUPTHER

## 2020-07-23 ENCOUNTER — VIRTUAL VISIT (OUTPATIENT)
Dept: FAMILY MEDICINE CLINIC | Age: 60
End: 2020-07-23

## 2020-07-23 DIAGNOSIS — N95.9 MENOPAUSAL DISORDER: ICD-10-CM

## 2020-07-23 DIAGNOSIS — F41.9 ANXIETY: ICD-10-CM

## 2020-07-23 DIAGNOSIS — L98.9 SKIN LESION: ICD-10-CM

## 2020-07-23 DIAGNOSIS — Z98.84 S/P GASTRIC BYPASS: ICD-10-CM

## 2020-07-23 DIAGNOSIS — I10 ESSENTIAL HYPERTENSION: Primary | ICD-10-CM

## 2020-07-23 RX ORDER — VENLAFAXINE HYDROCHLORIDE 37.5 MG/1
37.5 CAPSULE, EXTENDED RELEASE ORAL DAILY
Qty: 14 CAP | Refills: 0 | Status: SHIPPED | OUTPATIENT
Start: 2020-07-23 | End: 2020-08-06

## 2020-07-23 RX ORDER — BUPROPION HYDROCHLORIDE 150 MG/1
150 TABLET ORAL
Qty: 90 TAB | Refills: 1 | Status: SHIPPED | OUTPATIENT
Start: 2020-07-23 | End: 2020-10-01 | Stop reason: SINTOL

## 2020-07-23 RX ORDER — HYDROCHLOROTHIAZIDE 25 MG/1
25 TABLET ORAL DAILY
Qty: 90 TAB | Refills: 1 | Status: SHIPPED | OUTPATIENT
Start: 2020-07-23 | End: 2020-10-22 | Stop reason: SDUPTHER

## 2020-07-23 NOTE — PROGRESS NOTES
Kristen Madera presents today for   Chief Complaint   Patient presents with    Hypertension     Follow up    Medication Refill     Questions related to med refills. Is someone accompanying this pt? No    Is the patient using any DME equipment during OV? No    Depression Screening:  3 most recent PHQ Screens 1/22/2020   Little interest or pleasure in doing things Not at all   Feeling down, depressed, irritable, or hopeless Not at all   Total Score PHQ 2 0       Learning Assessment:  Learning Assessment 1/22/2020   PRIMARY LEARNER Patient   HIGHEST LEVEL OF EDUCATION - PRIMARY LEARNER  SOME COLLEGE   BARRIERS PRIMARY LEARNER NONE   CO-LEARNER CAREGIVER No   PRIMARY LANGUAGE ENGLISH   LEARNER PREFERENCE PRIMARY LISTENING     -     -     -     -   ANSWERED BY self   RELATIONSHIP SELF       Abuse Screening:  Abuse Screening Questionnaire 1/22/2020   Do you ever feel afraid of your partner? N   Are you in a relationship with someone who physically or mentally threatens you? N   Is it safe for you to go home? Y         Health Maintenance Due   Topic Date Due    DTaP/Tdap/Td series (1 - Tdap) 10/13/1982    Shingrix Vaccine Age 50> (1 of 2) 10/13/2011   . Health Maintenance reviewed and discussed and ordered per Provider. Coordination of Care  1. Have you been to the ER, urgent care clinic since your last visit? Hospitalized since your last visit? No    2. Have you seen or consulted any other health care providers outside of the [address] [place] [address] since your last visit? Include any pap smears or colon screening. No      Advance Directive:  1. Do you have an advance directive in place? Patient Reply:No    2. If not, would you like material regarding how to put one in place?  Patient Reply: No

## 2020-07-23 NOTE — PROGRESS NOTES
Oneida Emerson is a 63 y.o. female who was seen by synchronous (real-time) audio-video technology on 7/23/2020 for Hypertension (Follow up) and Medication Refill (Questions related to med refills.)        Assessment & Plan:   Diagnoses and all orders for this visit:    1. Essential hypertension  -     hydroCHLOROthiazide (HYDRODIURIL) 25 mg tablet; Take 1 Tab by mouth daily.  -     METABOLIC PANEL, COMPREHENSIVE; Future  -     LIPID PANEL; Future    2. Anxiety  -     venlafaxine-SR (EFFEXOR-XR) 37.5 mg capsule; Take 1 Cap by mouth daily for 14 days. -     buPROPion XL (WELLBUTRIN XL) 150 mg tablet; Take 1 Tab by mouth every morning. 3. Menopausal disorder  -     conjugated estrogens (PREMARIN) 0.3 mg tablet; Take 1 Tab by mouth daily. 4. Skin lesion  -     REFERRAL TO DERMATOLOGY    5. S/P gastric bypass  -     METABOLIC PANEL, COMPREHENSIVE; Future  -     LIPID PANEL; Future  -     VITAMIN D, 25 HYDROXY; Future  -     IRON PROFILE; Future  -     VITAMIN B12 & FOLATE; Future  -     VITAMIN B1, WHOLE BLOOD; Future  -     CBC WITH AUTOMATED DIFF; Future      The complexity of medical decision making for this visit is moderate   Follow-up and Dispositions    · Return in about 6 weeks (around 9/3/2020). I spent at least 30 minutes on this visit with this established patient. 712  Subjective:   Patient contacted via doxy. me. Pt has not been checking her bp recently. Her last check was wnl. Pt has been doing well overall. Pt would like to try wellbutrin in place of her effexor. She still has many social stressors. Pt has an itchy rash on her leg that has not responded well to antifungal cream or steroid creams. Prior to Admission medications    Medication Sig Start Date End Date Taking? Authorizing Provider   hydroCHLOROthiazide (HYDRODIURIL) 25 mg tablet Take 1 Tab by mouth daily.  7/23/20  Yes Margot Joseph MD   conjugated estrogens (PREMARIN) 0.3 mg tablet Take 1 Tab by mouth daily. 7/23/20  Yes Sherlyn Painting MD   venlafaxine-SR [facility] 37.5 mg capsule Take 1 Cap by mouth daily for 14 days. 7/23/20 8/6/20 Yes Sherlyn Painting MD   buPROPion XL (WELLBUTRIN XL) 150 mg tablet Take 1 Tab by mouth every morning. 7/23/20  Yes Adia Rodriguez MD   amLODIPine (NORVASC) 10 mg tablet Take 1 Tab by mouth daily. 7/6/20  Yes Sherlyn Painting MD   azelastine (ASTELIN) 137 mcg (0.1 %) nasal spray 1 [place] by Both Nostrils route two (2) times a day. Use in each nostril as directed 4/15/20  Yes Shirline Rubi B, NP   esomeprazole (NEXIUM) 40 mg capsule TAKE 1 CAPSULE DAILY 4/6/20  Yes Sherlyn Painting MD   cetirizine (ZYRTEC) 10 mg tablet Take 1 Tab by mouth daily. 3/29/20  Yes Sherlyn Painting MD   eszopiclone (LUNESTA) 3 mg tablet Take 1 Tab by mouth nightly. Max Daily Amount: 3 mg. 3/11/20  Yes Sherlyn Painting MD   telmisartan (MICARDIS) 80 mg tablet Take 1 Tab by mouth daily. 3/8/20  Yes Sherlyn Painting MD   albuterol (PROVENTIL HFA, VENTOLIN HFA, PROAIR HFA) 90 mcg/actuation inhaler Take 2 Puffs by inhalation every four (4) hours as needed for Wheezing. 1/22/20  Yes Adia Rodriguez MD   PEDIATRIC MULTIVIT COMB #19/FA (CHILDREN'S MULTI-VIT GUMMIES PO) Take  by mouth two (2) times a day. Yes Provider, Historical   calcium carbonate (CALTREX) 600 mg (1,500 mg) tablet Take 600 mg by mouth daily. Yes Provider, Historical   Vitamin B Complex-Vit B12 1,200 mcg/mL Drop by SubLINGual route daily. Yes Provider, Historical   fluticasone (FLONASE) 50 mcg/actuation nasal spray 2 Sprays by Both Nostrils route daily.  6/19/14   Sherlyn Painting MD     Patient Active Problem List   Diagnosis Code    Essential hypertension I10    Cerebral thrombosis with cerebral infarction ([facility] [address] I63.30    Hyperlipidemia E78.5    AR (allergic rhinitis) J30.9    GERD (gastroesophageal reflux disease) K21.9    S/P gastric bypass Z98.84    Thrombocytosis (HCC) D47.3     Current Outpatient Medications Medication Sig Dispense Refill    hydroCHLOROthiazide (HYDRODIURIL) 25 mg tablet Take 1 Tab by mouth daily. 90 Tab 1    conjugated estrogens (PREMARIN) 0.3 mg tablet Take 1 Tab by mouth daily. 90 Tab 1    venlafaxine-SR (EFFEXOR-XR) 37.5 mg capsule Take 1 Cap by mouth daily for 14 days. 14 Cap 0    buPROPion XL (WELLBUTRIN XL) 150 mg tablet Take 1 Tab by mouth every morning. 90 Tab 1    amLODIPine (NORVASC) 10 mg tablet Take 1 Tab by mouth daily. 90 Tab 1    azelastine (ASTELIN) 137 mcg (0.1 %) nasal spray 1 [place] by Both Nostrils route two (2) times a day. Use in each nostril as directed 1 Bottle 0    esomeprazole (NEXIUM) 40 mg capsule TAKE 1 CAPSULE DAILY 90 Cap 3    cetirizine (ZYRTEC) 10 mg tablet Take 1 Tab by mouth daily. 90 Tab 1    eszopiclone (LUNESTA) 3 mg tablet Take 1 Tab by mouth nightly. Max Daily Amount: 3 mg. 90 Tab 1    telmisartan (MICARDIS) 80 mg tablet Take 1 Tab by mouth daily. 90 Tab 3    albuterol (PROVENTIL HFA, VENTOLIN HFA, PROAIR HFA) 90 mcg/actuation inhaler Take 2 Puffs by inhalation every four (4) hours as needed for Wheezing. 1 Inhaler 0    PEDIATRIC MULTIVIT COMB #19/FA (CHILDREN'S MULTI-VIT GUMMIES PO) Take  by mouth two (2) times a day.  calcium carbonate (CALTREX) 600 mg (1,500 mg) tablet Take 600 mg by mouth daily.  Vitamin B Complex-Vit B12 1,200 mcg/mL Drop by SubLINGual route daily.  fluticasone (FLONASE) 50 mcg/actuation nasal spray 2 Sprays by Both Nostrils route daily.  3 Bottle 3     Allergies   Allergen Reactions    Tioconazole Itching     Past Medical History:   Diagnosis Date    AR (allergic rhinitis)     Cerebral thrombosis with cerebral infarction ([address] 9/10    Depression     GERD (gastroesophageal reflux disease) 7/5/2011    HTN (hypertension)     Hyperlipidemia     Ruptured appendix     Uterine prolapse      Past Surgical History:   Procedure Laterality Date    HX APPEN[facility]CTOMY  11/2/13    [facility]    HX BREAST REDUCTION     HX GASTRIC BYPASS  12    HX HAMMER TOE REPAIR  1986    right 5th toe    HX TOTAL ABDOMINAL HYSTERECTOMY  2004    LAP,CHOLECYSTECTOMY  2002    REPR VAGINAL PROLAPSE,UTEROSACRAL  2018    [facility]     Family History   Problem Relation Age of Onset    Hypertension Mother     Heart Attack Mother     Arthritis-osteo Father     Cancer Father         prostate    Hypertension Father     Stroke Father         CVA    Heart Attack Maternal Grandmother         MI    Osteoporosis Maternal Grandfather         Alzheimer's Disease    Other Paternal Grandmother     Heart Attack Paternal Grandfather         MI    Other Other         1 sib complications from Gastric Bypass    Hypertension Other         1 sib    Stroke Brother      Social History     Tobacco Use    Smoking status: Former Smoker     Packs/day: 0.50     Years: 15.00     Pack years: 7.50     Types: Cigarettes     Last attempt to quit: 2002     Years since quittin.5    Smokeless tobacco: Never Used   Substance Use Topics    Alcohol use: Yes     Comment: occassional wine       Review of Systems   Constitutional: Negative. HENT: Negative. Respiratory: Negative. Cardiovascular: Negative. Skin: Positive for itching and rash. All other systems reviewed and are negative. Objective:   No flowsheet data found. General: alert, cooperative, no distress   Mental  status: normal mood, behavior, speech, dress, motor activity, and thought processes, able to follow commands   HENT: NCAT   Neck: no visualized mass   Resp: no respiratory distress   Neuro: no gross deficits   Skin: no discoloration or lesions of concern on visible areas   Psychiatric: normal affect, consistent with stated mood, no evidence of hallucinations     Additional exam findings: none      We discussed the expected course, resolution and complications of the diagnosis(es) in detail.   Medication risks, benefits, costs, interactions, and alternatives were discussed as indicated. I advised her to contact the office if her condition worsens, changes or fails to improve as anticipated. She expressed understanding with the diagnosis(es) and plan. Lavern Malik, who was evaluated through a patient-initiated, synchronous (real-time) audio-video encounter, and/or her healthcare decision maker, is aware that it is a billable service, with coverage as determined by her insurance carrier. She provided verbal consent to proceed: n/a- consent obtained within past 12 months, and patient identification was verified. It was conducted pursuant to the emergency declaration under the [address] [place] [address], [address] [place] [address] authority and the [employer] General Act. A caregiver was present when appropriate. Ability to conduct physical exam was limited. I was in the office. The patient was at home.       Nadia Wahl MD

## 2020-08-26 DIAGNOSIS — G47.00 INSOMNIA, UNSPECIFIED TYPE: ICD-10-CM

## 2020-08-26 DIAGNOSIS — F41.9 ANXIETY: ICD-10-CM

## 2020-08-26 RX ORDER — BUPROPION HYDROCHLORIDE 150 MG/1
150 TABLET ORAL
Qty: 90 TAB | Refills: 1 | Status: CANCELLED | OUTPATIENT
Start: 2020-08-26

## 2020-08-27 DIAGNOSIS — I10 ESSENTIAL HYPERTENSION: ICD-10-CM

## 2020-08-28 NOTE — TELEPHONE ENCOUNTER
Reviewed chart. Rx was written 3/8/20, and has 3 refills. Refills should be available at pharmacy. Attempted to call and clarify with pt. Message left to call office.

## 2020-08-31 RX ORDER — ESZOPICLONE 3 MG/1
3 TABLET, FILM COATED ORAL
Qty: 90 TAB | Refills: 1 | Status: CANCELLED | OUTPATIENT
Start: 2020-09-10

## 2020-08-31 NOTE — TELEPHONE ENCOUNTER
Received fax from [facility] [id number] [facility] requesting rx to fill generic Lunesta. I pulled the written rx from front office and called patient to verify she wanted this to go to mail order instead of picking up the written rx for [facility].  She confirmed to send it to [address] [place], [place] [address]y [address]. Written rx given to [facility] CURTIS, LPN.

## 2020-08-31 NOTE — TELEPHONE ENCOUNTER
Refills were sent to the [facility] location and she still cannot use that location due to Matthewjose d. Please send this rx to [address] and I will let patient know when done so she can request to pick it up at [facility].

## 2020-09-01 NOTE — TELEPHONE ENCOUNTER
Pt is requesting this med be sent to [facility] pharmacy due to concerns with the [facility] location not being available. PCP: Dwayne Seymour MD    Last appt: 7/23/2020  Future Appointments   Date Time Provider Vernon Lawson   10/1/2020  1:00 PM [facility]       Requested Prescriptions     Pending Prescriptions Disp Refills    telmisartan (Micardis) 80 mg tablet 90 Tab 3     Sig: Take 1 Tab by mouth daily.        Last Labs done:  12/2019  Last UDS done:  N/A  Last :  N/A

## 2020-09-01 NOTE — TELEPHONE ENCOUNTER
Location of this Rx has been confirmed as changed and sent to provider. Also her other controlled substance [facility]

## 2020-09-02 RX ORDER — TELMISARTAN 80 MG/1
80 TABLET ORAL DAILY
Qty: 90 TAB | Refills: 3 | Status: SHIPPED | OUTPATIENT
Start: 2020-09-02 | End: 2021-03-11 | Stop reason: SDUPTHER

## 2020-09-03 ENCOUNTER — TELEPHONE (OUTPATIENT)
Dept: FAMILY MEDICINE CLINIC | Age: 60
End: 2020-09-03

## 2020-09-03 NOTE — TELEPHONE ENCOUNTER
Mrs. Medrano Fees stopped the Wellbutrin and went back to Effexor. She was experiencing just about every possible side effect listed for Wellbutrin.

## 2020-10-01 ENCOUNTER — VIRTUAL VISIT (OUTPATIENT)
Dept: FAMILY MEDICINE CLINIC | Age: 60
End: 2020-10-01
Payer: OTHER GOVERNMENT

## 2020-10-01 DIAGNOSIS — M25.511 CHRONIC RIGHT SHOULDER PAIN: ICD-10-CM

## 2020-10-01 DIAGNOSIS — Z98.84 S/P GASTRIC BYPASS: ICD-10-CM

## 2020-10-01 DIAGNOSIS — N95.9 MENOPAUSAL DISORDER: ICD-10-CM

## 2020-10-01 DIAGNOSIS — N95.1 HOT FLASHES DUE TO MENOPAUSE: ICD-10-CM

## 2020-10-01 DIAGNOSIS — L98.9 SKIN LESION: ICD-10-CM

## 2020-10-01 DIAGNOSIS — M25.511 CHRONIC RIGHT SHOULDER PAIN: Primary | ICD-10-CM

## 2020-10-01 DIAGNOSIS — G89.29 CHRONIC RIGHT SHOULDER PAIN: ICD-10-CM

## 2020-10-01 DIAGNOSIS — G89.29 CHRONIC RIGHT SHOULDER PAIN: Primary | ICD-10-CM

## 2020-10-01 DIAGNOSIS — I10 ESSENTIAL HYPERTENSION: ICD-10-CM

## 2020-10-01 PROCEDURE — 99214 OFFICE O/P EST MOD 30 MIN: CPT | Performed by: FAMILY MEDICINE

## 2020-10-01 RX ORDER — VENLAFAXINE HYDROCHLORIDE 75 MG/1
75 CAPSULE, EXTENDED RELEASE ORAL DAILY
Qty: 90 CAP | Refills: 1 | Status: SHIPPED | OUTPATIENT
Start: 2020-10-01 | End: 2021-03-01 | Stop reason: SDUPTHER

## 2020-10-01 NOTE — PROGRESS NOTES
Mat Rodriguez is a 63 y.o. female who was seen by synchronous (real-time) audio-video technology on 10/1/2020 for Medication Evaluation (States she began an new medication for menopause) and Medication Refill        Assessment & Plan:   Diagnoses and all orders for this visit:    1. Chronic right shoulder pain  -     XR SHOULDER RT AP/LAT MIN 2 V; Future    2. Hot flashes due to menopause  -     venlafaxine-SR [facility] 75 mg capsule; Take 1 Cap by mouth daily. 3. Essential hypertension  Labs pending    4. S/P gastric bypass  Labs pending    5. Skin lesion  Resubmit referral    The complexity of medical decision making for this visit is moderate   Follow-up and Dispositions    · Return in about 3 months (around 1/1/2021) for high blood pressure, lab review, insomnia. 712  Subjective:   Patient contacted via doxy. me. Pt has not had her labs done yet. She will get them soon. Pt did not hear anything on the derm referral.  Her rash is now on the other leg as well. Pt tried wellbutrin for anxiety and menopausal sx. She was not able to tolerate it. After about 30 min, she would feel funny. She did resume her effexor. She is taking the 37.5mg dose now. She was on 75mg previously. She would like to resume this. Pt c/o R shoulder pain. She has trouble lifting the arm above her head. The pain has been there for about 2 months. It does improve with tylenol arthritis. She has pain when making the bed or putting on her coat. She would like to get an xray. Prior to Admission medications    Medication Sig Start Date End Date Taking? Authorizing Provider   venlafaxine-SR [facility] 75 mg capsule Take 1 Cap by mouth daily. 10/1/20  Yes Heavenly Rodriguez MD   eszopiclone (LUNESTA) 3 mg tablet Take 1 Tab by mouth nightly. Max Daily Amount: 3 mg. 9/10/20  Yes Macie Gonzalez MD   telmisartan (Micardis) 80 mg tablet Take 1 Tab by mouth daily.  9/2/20  Yes Macie Gonzalez MD hydroCHLOROthiazide (HYDRODIURIL) 25 mg tablet Take 1 Tab by mouth daily. 7/23/20  Yes Linda Bishop MD   conjugated estrogens (PREMARIN) 0.3 mg tablet Take 1 Tab by mouth daily. 7/23/20  Yes Linda Bishop MD   amLODIPine (NORVASC) 10 mg tablet Take 1 Tab by mouth daily. 7/6/20  Yes Linda Bishop MD   azelastine (ASTELIN) 137 mcg (0.1 %) nasal spray 1 [place] by Both Nostrils route two (2) times a day. Use in each nostril as directed 4/15/20  Yes Cortez BABIN NP   esomeprazole (NEXIUM) 40 mg capsule TAKE 1 CAPSULE DAILY 4/6/20  Yes Linda Bishop MD   cetirizine (ZYRTEC) 10 mg tablet Take 1 Tab by mouth daily. 3/29/20  Yes Linda Bishop MD   albuterol (PROVENTIL HFA, VENTOLIN HFA, PROAIR HFA) 90 mcg/actuation inhaler Take 2 Puffs by inhalation every four (4) hours as needed for Wheezing. 1/22/20  Yes Tawny Rodriguez MD   fluticasone (FLONASE) 50 mcg/actuation nasal spray 2 Sprays by Both Nostrils route daily. 6/19/14  Yes Tawny Rodriguez MD   PEDIATRIC MULTIVIT COMB #19/FA (CHILDREN'S MULTI-VIT GUMMIES PO) Take  by mouth two (2) times a day. Yes Provider, Historical   calcium carbonate (CALTREX) 600 mg (1,500 mg) tablet Take 600 mg by mouth daily. Yes Provider, Historical   Vitamin B Complex-Vit B12 1,200 mcg/mL Drop by SubLINGual route daily. Yes Provider, Historical     Patient Active Problem List   Diagnosis Code    Essential hypertension I10    Cerebral thrombosis with cerebral infarction ([facility] [address] I63.30    Hyperlipidemia E78.5    AR (allergic rhinitis) J30.9    GERD (gastroesophageal reflux disease) K21.9    S/P gastric bypass Z98.84    Thrombocytosis (HCC) D47.3     Current Outpatient Medications   Medication Sig Dispense Refill    venlafaxine-SR (EFFEXOR-XR) 75 mg capsule Take 1 Cap by mouth daily. 90 Cap 1    eszopiclone (LUNESTA) 3 mg tablet Take 1 Tab by mouth nightly. Max Daily Amount: 3 mg. 90 Tab 0    telmisartan (Micardis) 80 mg tablet Take 1 Tab by mouth daily.  80 Tab 3    hydroCHLOROthiazide (HYDRODIURIL) 25 mg tablet Take 1 Tab by mouth daily. 90 Tab 1    conjugated estrogens (PREMARIN) 0.3 mg tablet Take 1 Tab by mouth daily. 90 Tab 1    amLODIPine (NORVASC) 10 mg tablet Take 1 Tab by mouth daily. 90 Tab 1    azelastine (ASTELIN) 137 mcg (0.1 %) nasal spray 1 [place] by Both Nostrils route two (2) times a day. Use in each nostril as directed 1 Bottle 0    esomeprazole (NEXIUM) 40 mg capsule TAKE 1 CAPSULE DAILY 90 Cap 3    cetirizine (ZYRTEC) 10 mg tablet Take 1 Tab by mouth daily. 90 Tab 1    albuterol (PROVENTIL HFA, VENTOLIN HFA, PROAIR HFA) 90 mcg/actuation inhaler Take 2 Puffs by inhalation every four (4) hours as needed for Wheezing. 1 Inhaler 0    fluticasone (FLONASE) 50 mcg/actuation nasal spray 2 Sprays by Both Nostrils route daily. 3 Bottle 3    PEDIATRIC MULTIVIT COMB #19/FA (CHILDREN'S MULTI-VIT GUMMIES PO) Take  by mouth two (2) times a day.  calcium carbonate (CALTREX) 600 mg (1,500 mg) tablet Take 600 mg by mouth daily.  Vitamin B Complex-Vit B12 1,200 mcg/mL Drop by SubLINGual route daily.          Allergies   Allergen Reactions    Tioconazole Itching     Past Medical History:   Diagnosis Date    AR (allergic rhinitis)     Cerebral thrombosis with cerebral infarction ([facility] [address] 9/10    Depression     GERD (gastroesophageal reflux disease) 7/5/2011    HTN (hypertension)     Hyperlipidemia     Ruptured appendix     Uterine prolapse      Past Surgical History:   Procedure Laterality Date    [facility]    HX BREAST REDUCTION  2002    HX GASTRIC BYPASS  4/16/12    HX HAMMER TOE REPAIR  1986    right 5th toe    HX TOTAL ABDOMINAL HYSTERECTOMY  2004    LAP,CHOLECYSTECTOMY  2002    REPR VAGINAL PROLAPSE,UTEROSACRAL  05/29/2018    [facility]     Family History   Problem Relation Age of Onset    Hypertension Mother     Heart Attack Mother     Arthritis-osteo Father     Cancer Father prostate    Hypertension Father     Stroke Father         CVA    Heart Attack Maternal Grandmother         MI    Osteoporosis Maternal Grandfather         Alzheimer's Disease    Other Paternal Grandmother     Heart Attack Paternal Grandfather         MI    Other Other         1 sib complications from Gastric Bypass    Hypertension Other         1 sib    Stroke Brother      Social History     Tobacco Use    Smoking status: Former Smoker     Packs/day: 0.50     Years: 15.00     Pack years: 7.50     Types: Cigarettes     Last attempt to quit: 2002     Years since quittin.7    Smokeless tobacco: Never Used   Substance Use Topics    Alcohol use: Yes     Comment: occassional wine       Review of Systems   Constitutional: Negative. HENT: Negative. Respiratory: Negative. Cardiovascular: Negative. All other systems reviewed and are negative. Objective:   No flowsheet data found. General: alert, cooperative, no distress   Mental  status: normal mood, behavior, speech, dress, motor activity, and thought processes, able to follow commands   HENT: NCAT   Neck: no visualized mass   Resp: no respiratory distress   Neuro: no gross deficits   Skin: no discoloration or lesions of concern on visible areas   Psychiatric: normal affect, consistent with stated mood, no evidence of hallucinations     Additional exam findings: none      We discussed the expected course, resolution and complications of the diagnosis(es) in detail. Medication risks, benefits, costs, interactions, and alternatives were discussed as indicated. I advised her to contact the office if her condition worsens, changes or fails to improve as anticipated. She expressed understanding with the diagnosis(es) and plan.        Loly Timothy, who was evaluated through a patient-initiated, synchronous (real-time) audio-video encounter, and/or her healthcare decision maker, is aware that it is a billable service, with coverage as determined by her insurance carrier. She provided verbal consent to proceed: n/a- consent obtained within past 12 months, and patient identification was verified. It was conducted pursuant to the emergency declaration under the [address] [place] [address] authority and the [employer] General Act. A caregiver was present when appropriate. Ability to conduct physical exam was limited. I was in the office. The patient was at home.       Sahara Leggett MD

## 2020-10-01 NOTE — PROGRESS NOTES
Ofe Wills presents today for   Chief Complaint   Patient presents with    Medication Evaluation     States she began an new medication for menopause    Medication Refill       Is someone accompanying this pt? No    Is the patient using any DME equipment during OV? No    Depression Screening:  3 most recent PHQ Screens 1/22/2020   Little interest or pleasure in doing things Not at all   Feeling down, depressed, irritable, or hopeless Not at all   Total Score PHQ 2 0       Learning Assessment:  Learning Assessment 1/22/2020   PRIMARY LEARNER Patient   HIGHEST LEVEL OF EDUCATION - PRIMARY LEARNER  SOME COLLEGE   BARRIERS PRIMARY LEARNER NONE   CO-LEARNER CAREGIVER No   PRIMARY LANGUAGE ENGLISH   LEARNER PREFERENCE PRIMARY LISTENING     -     -     -     -   ANSWERED BY self   RELATIONSHIP SELF       Abuse Screening:  Abuse Screening Questionnaire 1/22/2020   Do you ever feel afraid of your partner? N   Are you in a relationship with someone who physically or mentally threatens you? N   Is it safe for you to go home? Y         Health Maintenance Due   Topic Date Due    Statin Therapy  1960    DTaP/Tdap/Td series (1 - Tdap) 10/13/1982    Shingrix Vaccine Age 50> (1 of 2) 10/13/2011    Flu Vaccine (1) 09/01/2020   . Health Maintenance reviewed and discussed and ordered per Provider. Coordination of Care  1. Have you been to the ER, urgent care clinic since your last visit? Hospitalized since your last visit? No    2. Have you seen or consulted any other health care providers outside of the [address] [place] [address] since your last visit? Include any pap smears or colon screening. No        Advance Directive:  1. Do you have an advance directive in place? Patient Reply:No    2. If not, would you like material regarding how to put one in place?  Patient Reply: No

## 2020-10-22 DIAGNOSIS — I10 ESSENTIAL HYPERTENSION: ICD-10-CM

## 2020-10-22 RX ORDER — HYDROCHLOROTHIAZIDE 25 MG/1
25 TABLET ORAL DAILY
Qty: 30 TAB | Refills: 0 | Status: SHIPPED | OUTPATIENT
Start: 2020-10-22 | End: 2020-10-26 | Stop reason: SDUPTHER

## 2020-10-26 DIAGNOSIS — I10 ESSENTIAL HYPERTENSION: ICD-10-CM

## 2020-10-26 RX ORDER — HYDROCHLOROTHIAZIDE 25 MG/1
25 TABLET ORAL DAILY
Qty: 90 TAB | Refills: 4 | Status: SHIPPED | OUTPATIENT
Start: 2020-10-26 | End: 2021-03-01 | Stop reason: SDUPTHER

## 2020-11-03 ENCOUNTER — TELEPHONE (OUTPATIENT)
Dept: FAMILY MEDICINE CLINIC | Age: 60
End: 2020-11-03

## 2020-11-03 DIAGNOSIS — Z98.84 S/P GASTRIC BYPASS: Primary | ICD-10-CM

## 2020-11-03 LAB
25(OH)D3 SERPL-MCNC: 43.3 NG/ML (ref 32–100)
A-G RATIO,AGRAT: 1.7 RATIO (ref 1.1–2.6)
ABSOLUTE LYMPHOCYTE COUNT, 10803: 2 K/UL (ref 1–4.8)
ALBUMIN SERPL-MCNC: 4.6 G/DL (ref 3.5–5)
ALP SERPL-CCNC: 78 U/L (ref 25–115)
ALT SERPL-CCNC: 10 U/L (ref 5–40)
ANION GAP SERPL CALC-SCNC: 12 MMOL/L (ref 3–15)
AST SERPL W P-5'-P-CCNC: 26 U/L (ref 10–37)
BASOPHILS # BLD: 0.1 K/UL (ref 0–0.2)
BASOPHILS NFR BLD: 1 % (ref 0–2)
BILIRUB SERPL-MCNC: 0.3 MG/DL (ref 0.2–1.2)
BUN SERPL-MCNC: 15 MG/DL (ref 6–22)
CALCIUM SERPL-MCNC: 10 MG/DL (ref 8.4–10.5)
CHLORIDE SERPL-SCNC: 96 MMOL/L (ref 98–110)
CHOLEST SERPL-MCNC: 273 MG/DL (ref 110–200)
CO2 SERPL-SCNC: 33 MMOL/L (ref 20–32)
CREAT SERPL-MCNC: 0.7 MG/DL (ref 0.5–1.2)
EOSINOPHIL # BLD: 0.1 K/UL (ref 0–0.5)
EOSINOPHIL NFR BLD: 2 % (ref 0–6)
ERYTHROCYTE [DISTWIDTH] IN BLOOD BY AUTOMATED COUNT: 14.2 % (ref 10–15.5)
FE % SATURATION,PSAT: 28 % (ref 20–50)
FOLATE,FOL: >20 NG/ML
GFRAA, 66117: >60
GFRNA, 66118: >60
GLOBULIN,GLOB: 2.7 G/DL (ref 2–4)
GLUCOSE SERPL-MCNC: 102 MG/DL (ref 70–99)
GRANULOCYTES,GRANS: 49 % (ref 40–75)
HCT VFR BLD AUTO: 38.5 % (ref 35.1–48)
HDLC SERPL-MCNC: 119 MG/DL
HDLC SERPL-MCNC: 2.3 MG/DL (ref 0–5)
HGB BLD-MCNC: 12.3 G/DL (ref 11.7–16)
IRON,IRN: 126 MCG/DL (ref 30–160)
LDL/HDL RATIO,LDHD: 1.1
LDLC SERPL CALC-MCNC: 133 MG/DL (ref 50–99)
LYMPHOCYTES, LYMLT: 40 % (ref 20–45)
MCH RBC QN AUTO: 29 PG (ref 26–34)
MCHC RBC AUTO-ENTMCNC: 32 G/DL (ref 31–36)
MCV RBC AUTO: 91 FL (ref 81–99)
MONOCYTES # BLD: 0.4 K/UL (ref 0.1–1)
MONOCYTES NFR BLD: 8 % (ref 3–12)
NEUTROPHILS # BLD AUTO: 2.4 K/UL (ref 1.8–7.7)
NON-HDL CHOLESTEROL, 011976: 154 MG/DL
PLATELET # BLD AUTO: 309 K/UL (ref 140–440)
PMV BLD AUTO: 11.6 FL (ref 9–13)
POTASSIUM SERPL-SCNC: 4.4 MMOL/L (ref 3.5–5.5)
PROT SERPL-MCNC: 7.3 G/DL (ref 6.4–8.3)
RBC # BLD AUTO: 4.21 M/UL (ref 3.8–5.2)
SODIUM SERPL-SCNC: 141 MMOL/L (ref 133–145)
TIBC,TIBC: 447 MCG/DL (ref 228–428)
TRIGL SERPL-MCNC: 107 MG/DL (ref 40–149)
UIBC SERPL-MCNC: 321 MCG/DL (ref 110–370)
VIT B12 SERPL-MCNC: 1192 PG/ML (ref 211–911)
VLDLC SERPL CALC-MCNC: 21 MG/DL (ref 8–30)
WBC # BLD AUTO: 4.8 K/UL (ref 4–11)

## 2020-11-03 NOTE — TELEPHONE ENCOUNTER
Patient's B1 lab will need to be recollected. Per lab staff at [facility], sample is unable to be used. Provider informed. Order pended.

## 2020-11-06 ENCOUNTER — HOSPITAL ENCOUNTER (OUTPATIENT)
Dept: GENERAL RADIOLOGY | Age: 60
Discharge: HOME | End: 2020-11-06
Payer: OTHER GOVERNMENT

## 2020-11-06 PROCEDURE — 73030 X-RAY EXAM OF SHOULDER: CPT

## 2020-11-10 LAB — VITAMIN B1, WHOLE BLOOD, 66250: 112.8 NMOL/L (ref 66.5–200)

## 2020-11-19 ENCOUNTER — TELEPHONE (OUTPATIENT)
Dept: FAMILY MEDICINE CLINIC | Age: 60
End: 2020-11-19

## 2020-11-19 DIAGNOSIS — J30.89 OTHER ALLERGIC RHINITIS: ICD-10-CM

## 2020-11-19 DIAGNOSIS — M25.511 CHRONIC RIGHT SHOULDER PAIN: Primary | ICD-10-CM

## 2020-11-19 DIAGNOSIS — G89.29 CHRONIC RIGHT SHOULDER PAIN: Primary | ICD-10-CM

## 2020-11-19 DIAGNOSIS — J30.9 ALLERGIC RHINITIS, UNSPECIFIED SEASONALITY, UNSPECIFIED TRIGGER: Primary | ICD-10-CM

## 2020-11-19 DIAGNOSIS — J30.9 ALLERGIC RHINITIS: ICD-10-CM

## 2020-11-19 NOTE — TELEPHONE ENCOUNTER
Pt also stated that she is in pain and when she sleeps on it and would like to know if a MRI is needed or what is her next step.

## 2020-11-19 NOTE — TELEPHONE ENCOUNTER
Pt called requesting refill for medication . Requested Prescriptions     Pending Prescriptions Disp Refills    fluticasone propionate (Flonase) 50 mcg/actuation nasal spray 3 Bottle 3     Si Sprays by Both Nostrils route daily.  cetirizine (ZYRTEC) 10 mg tablet 90 Tab 1     Sig: Take 1 Tab by mouth daily.

## 2020-11-19 NOTE — TELEPHONE ENCOUNTER
Mic was nl.    cooper referral submitted; referring pt to ortho. I do not feel we need to order an mri at this time. She needs to see ortho. They can decide about whether it is needed.

## 2020-11-21 DIAGNOSIS — J01.00 ACUTE NON-RECURRENT MAXILLARY SINUSITIS: ICD-10-CM

## 2020-11-24 RX ORDER — CETIRIZINE HCL 10 MG
10 TABLET ORAL DAILY
Qty: 90 TAB | Refills: 1 | Status: SHIPPED | OUTPATIENT
Start: 2020-11-24 | End: 2021-09-10 | Stop reason: SDUPTHER

## 2020-11-24 RX ORDER — FLUTICASONE PROPIONATE 50 MCG
2 SPRAY, SUSPENSION (ML) NASAL DAILY
Qty: 3 BOTTLE | Refills: 3 | Status: SHIPPED | OUTPATIENT
Start: 2020-11-24 | End: 2021-09-10 | Stop reason: ALTCHOICE

## 2020-11-27 NOTE — TELEPHONE ENCOUNTER
Referral has been submitted approved and scanned in CC, Called pt on 11/27, LM for patient to call office back to inform of providers plans

## 2020-12-03 RX ORDER — AZELASTINE 1 MG/ML
1 SPRAY, METERED NASAL 2 TIMES DAILY
Qty: 1 BOTTLE | Refills: 0 | Status: SHIPPED | OUTPATIENT
Start: 2020-12-03 | End: 2021-03-11 | Stop reason: SDUPTHER

## 2020-12-11 DIAGNOSIS — G47.00 INSOMNIA, UNSPECIFIED TYPE: ICD-10-CM

## 2020-12-14 RX ORDER — ESZOPICLONE 3 MG/1
TABLET, FILM COATED ORAL
Qty: 90 TAB | Refills: 0 | Status: SHIPPED | OUTPATIENT
Start: 2020-12-14 | End: 2021-02-19

## 2020-12-17 ENCOUNTER — OFFICE VISIT (OUTPATIENT)
Dept: ORTHOPEDIC SURGERY | Age: 60
End: 2020-12-17
Payer: OTHER GOVERNMENT

## 2020-12-17 VITALS
DIASTOLIC BLOOD PRESSURE: 86 MMHG | WEIGHT: 165 LBS | HEART RATE: 73 BPM | RESPIRATION RATE: 16 BRPM | BODY MASS INDEX: 30.36 KG/M2 | SYSTOLIC BLOOD PRESSURE: 148 MMHG | HEIGHT: 62 IN | TEMPERATURE: 97 F

## 2020-12-17 DIAGNOSIS — M19.011 PRIMARY OSTEOARTHRITIS OF RIGHT SHOULDER: ICD-10-CM

## 2020-12-17 DIAGNOSIS — I10 ESSENTIAL HYPERTENSION: ICD-10-CM

## 2020-12-17 DIAGNOSIS — M75.01 ADHESIVE CAPSULITIS OF RIGHT SHOULDER: Primary | ICD-10-CM

## 2020-12-17 PROCEDURE — 20611 DRAIN/INJ JOINT/BURSA W/US: CPT | Performed by: ORTHOPAEDIC SURGERY

## 2020-12-17 PROCEDURE — 99203 OFFICE O/P NEW LOW 30 MIN: CPT | Performed by: ORTHOPAEDIC SURGERY

## 2020-12-17 RX ORDER — GLUCOSAMINE/CHONDR SU A SOD 750-600 MG
TABLET ORAL
COMMUNITY

## 2020-12-17 RX ORDER — ACETAMINOPHEN 325 MG/1
TABLET ORAL
COMMUNITY
Start: 2020-12-12 | End: 2022-04-26 | Stop reason: ALTCHOICE

## 2020-12-17 RX ORDER — CYCLOBENZAPRINE HCL 10 MG
TABLET ORAL
COMMUNITY
Start: 2020-12-12 | End: 2021-04-14 | Stop reason: ALTCHOICE

## 2020-12-17 RX ORDER — LIDOCAINE 50 MG/G
PATCH TOPICAL
COMMUNITY
Start: 2020-12-12 | End: 2022-01-11

## 2020-12-17 RX ORDER — TRIAMCINOLONE ACETONIDE 40 MG/ML
40 INJECTION, SUSPENSION INTRA-ARTICULAR; INTRAMUSCULAR ONCE
Status: COMPLETED | OUTPATIENT
Start: 2020-12-17 | End: 2020-12-17

## 2020-12-17 RX ADMIN — TRIAMCINOLONE ACETONIDE 40 MG: 40 INJECTION, SUSPENSION INTRA-ARTICULAR; INTRAMUSCULAR at 11:30

## 2020-12-17 NOTE — PROGRESS NOTES
Thierry Austin  1960   Chief Complaint   Patient presents with    Shoulder Pain     right        HISTORY OF PRESENT ILLNESS  Thierry Austin is a 62 y.o. female who presents today for evaluation of right shoulder. She rates her pain 8/10 today. Pain has been present for 2 months. She communicates just waking up one morning and felt the pain. Patient describes the pain as pain and radiating that is Constant in nature. Symptoms are worse with activities and worse at night. Associated symptoms include limited ROM, Swelling. Since problem started, it: has worsened. Pt is unable to do daily activities without pain. Pain does wake patient up at night. Has taken pain patches and Tylenol for the problem. Has tried following treatments: Injections:NO; Brace:NO; Therapy:NO; Cane/Crutch:NO     Pain Assessment  12/17/2020   Location of Pain Shoulder   Location Modifiers Right   Quality of Pain Aching   Aggravating Factors Other (Comment)   Aggravating Factors Comment mornings are the worse. Raising it over the head.    Limiting Behavior Yes   Relieving Factors Other (Comment)   Relieving Factors Comment patches and tylenol   Result of Injury No       Allergies   Allergen Reactions    Tioconazole Itching        Past Medical History:   Diagnosis Date    AR (allergic rhinitis)     Cerebral thrombosis with cerebral infarction ([facility] [address] 9/10    Depression     GERD (gastroesophageal reflux disease) 7/5/2011    HTN (hypertension)     Hyperlipidemia     Ruptured appendix     Uterine prolapse       Social History     Socioeconomic History    Marital status:      Spouse name: Not on file    Number of children: Not on file    Years of education: Not on file    Highest education level: Not on file   Occupational History    Occupation: MA   Social Needs    Financial resource strain: Not on file    Food insecurity     Worry: Not on file     Inability: Not on file    Transportation needs     Medical: Not on file     Non-medical: Not on file   Tobacco Use    Smoking status: Former Smoker     Packs/day: 0.50     Years: 15.00     Pack years: 7.50     Types: Cigarettes     Quit date: 2002     Years since quittin.9    Smokeless tobacco: Never Used   Substance and Sexual Activity    Alcohol use: Yes     Comment: occassional wine    Drug use: Yes     Types: Prescription, OTC     Comment: prescribed and over the counter medication    Sexual activity: Not on file   Lifestyle    Physical activity     Days per week: Not on file     Minutes per session: Not on file    Stress: Not on file   Relationships    Social connections     Talks on phone: Not on file     Gets together: Not on file     Attends Nondenominational service: Not on file     Active member of club or organization: Not on file     Attends meetings of clubs or organizations: Not on file     Relationship status: Not on file    Intimate partner violence     Fear of current or ex partner: Not on file     Emotionally abused: Not on file     Physically abused: Not on file     Forced sexual activity: Not on file   Other Topics Concern    Not on file   Social History Narrative    Not on file      Past Surgical History:   Procedure Laterality Date    HX APPENDECTOMY  13    [address]      HX GASTRIC BYPASS  12    HX HAMMER TOE REPAIR  1986    right 5th toe    HX TOTAL ABDOMINAL HYSTERECTOMY  2004    LAP,CHOLECYSTECTOMY  2002    [facility]  2018    [facility]      Family History   Problem Relation Age of Onset    Hypertension Mother     Heart Attack Mother    Lodema Jha Father     Cancer Father         prostate    Hypertension Father     Stroke Father         CVA    Heart Attack Maternal Grandmother         MI    Osteoporosis Maternal Grandfather         Alzheimer's Disease    Other Paternal Grandmother     Heart Attack Paternal Grandfather         MI   [address] [place] [address] Other Other         1 sib complications from Gastric Bypass    Hypertension Other         1 sib    Stroke Brother       Current Outpatient Medications   Medication Sig    lidocaine (LIDODERM) 5 %     acetaminophen (TYLENOL) 325 mg tablet     cyclobenzaprine (FLEXERIL) 10 mg tablet     Biotin 2,500 mcg cap Take  by mouth.  eszopiclone (LUNESTA) 3 mg tablet TAKE 1 TABLET NIGHTLY ( MAXIMUM DAILY AMOUNT 3 MG )    azelastine (ASTELIN) 137 mcg (0.1 %) nasal spray 1 [place] by Both Nostrils route two (2) times a day. Use in each nostril as directed    fluticasone propionate (Flonase) 50 mcg/actuation nasal spray 2 Sprays by Both Nostrils route daily.  cetirizine (ZYRTEC) 10 mg tablet Take 1 Tab by mouth daily.  hydroCHLOROthiazide (HYDRODIURIL) 25 mg tablet Take 1 Tab by mouth daily.  venlafaxine-SR (EFFEXOR-XR) 75 mg capsule Take 1 Cap by mouth daily.  telmisartan (Micardis) 80 mg tablet Take 1 Tab by mouth daily.  conjugated estrogens (PREMARIN) 0.3 mg tablet Take 1 Tab by mouth daily.  amLODIPine (NORVASC) 10 mg tablet Take 1 Tab by mouth daily.  esomeprazole (NEXIUM) 40 mg capsule TAKE 1 CAPSULE DAILY    albuterol (PROVENTIL HFA, VENTOLIN HFA, PROAIR HFA) 90 mcg/actuation inhaler Take 2 Puffs by inhalation every four (4) hours as needed for Wheezing.  PEDIATRIC MULTIVIT COMB #19/FA (CHILDREN'S MULTI-VIT GUMMIES PO) Take  by mouth two (2) times a day.  calcium carbonate (CALTREX) 600 mg (1,500 mg) tablet Take 600 mg by mouth daily.  Vitamin B Complex-Vit B12 1,200 mcg/mL Drop by SubLINGual route daily. No current facility-administered medications for this visit. REVIEW OF SYSTEM   Patient denies: Weight loss, Fever/Chills, HA, Visual changes, Fatigue, Chest pain, SOB, Abdominal pain, N/V/D/C, Blood in stool or urine, Edema. Pertinent positive as above in HPI.  All others were negative    PHYSICAL EXAM:   Visit Vitals  BP (!) 148/86 (BP 1 Location: Left arm)   Pulse 73   Temp 97 °F (36.1 °C)   Resp 16   Ht 5' 2\" (1.575 m)   Wt 165 lb (74.8 kg)   LMP 12/31/1996   BMI 30.18 kg/m²     The patient is a well-developed, well-nourished female   in no acute distress. The patient is alert and oriented times three. The patient is alert and oriented times three. Mood and affect are normal.  LYMPHATIC: lymph nodes are not enlarged and are within normal limits  SKIN: normal in color and non tender to palpation. There are no bruises or abrasions noted. NEUROLOGICAL: Motor sensory exam is within normal limits. Reflexes are equal bilaterally. There is normal sensation to pinprick and light touch  MUSCULOSKELETAL:  Glenohumeral abduction 45 degrees with pain external rotation 30 degrees with pain no instabilities    PROCEDURE: Right cortisone Injection with Ultrasound Guidance  Indication:Right shoulder pain/swelling    After sterile prep, 6 cc of Xylocaine and 1 cc of Kenalog were injected into the right shoulder. Ultrasound images captured using [address] Ultrasound machine and scanned into patient's chart.        [facility]  OFFICE PROCEDURE PROGRESS NOTE        Chart reviewed for the following:  Jose Brittle, M.D, have reviewed the History, Physical and updated the Allergic reactions for Anahi Headleypat Kitty 62 performed immediately prior to start of procedure:  Jose Brittle, M.D, have performed the following reviews on [address] [place] [address] prior to the start of the procedure:            * Patient was identified by name and date of birth   * Agreement on procedure being performed was verified  * Risks and Benefits explained to the patient  * Procedure site verified and marked as necessary  * Patient was positioned for comfort  * Consent was signed and verified     Time: 10:47 AM     Date of procedure: 12/17/2020    Procedure performed by:  Dean Wilburn M.D    Provider assisted by: (see medication administration)    How tolerated by patient: tolerated the procedure well with no complications    Comments: none      IMAGING: [facility] obtained at [facility] show no acute abnormalities    IMPRESSION:      ICD-10-CM ICD-9-CM    1. Adhesive capsulitis of right shoulder  M75.01 726.0 REFERRAL TO PHYSICAL THERAPY   2. Primary osteoarthritis of right shoulder  M19.011 715.11 triamcinolone acetonide (KENALOG-40) 40 mg/mL injection 40 mg      ARTHROCENTESIS ASPIR&/INJ MAJOR JT/BURSA W/US        PLAN:  1. Pt presents today with right shoulder pain due to adhesive capsulitis and I would like for her to begin PT. Was also given a cortisone injection today. I am hoping this will help alleviate her symptoms. Risk factors include: n/a  2. No ultrasound exam indicated today  3. Yes cortisone injection indicated today   4. Yes Physical/Occupational Therapy indicated today  5. No diagnostic test indicated today:   6. No durable medical equipment indicated today  7. No referral indicated today   8. No medications indicated today:   9. No Narcotic indicated today      RTC 4 weeks      Scribed by Bibiana Noyola) as dictated by Ivette Gottlieb MD    I, Dr. Ivette Gottlieb, confirm that all documentation is accurate.     Ivette Gottlieb M.D.   [facility]

## 2020-12-17 NOTE — TELEPHONE ENCOUNTER
Patient need a medication refill. She wanted to know if you could put some refills. Please advise    Requested Prescriptions     Pending Prescriptions Disp Refills    amLODIPine (NORVASC) 10 mg tablet 90 Tab 1     Sig: Take 1 Tab by mouth daily.

## 2020-12-17 NOTE — LETTER
12/17/2020    Patient: Loly Aguirre   YOB: 1960   Date of Visit: 12/17/2020     Leandro Rider MD  [id number]  [phone]  [facility]    Dear Leandro Rider MD,      Thank you for referring Ms. Radha Franco to  ORTHOPAEDICS for evaluation. My notes for this consultation are attached. If you have questions, please do not hesitate to call me. I look forward to following your patient along with you.       Sincerely,    Aurora Yousif MD

## 2020-12-18 RX ORDER — AMLODIPINE BESYLATE 10 MG/1
10 TABLET ORAL DAILY
Qty: 90 TAB | Refills: 0 | Status: SHIPPED | OUTPATIENT
Start: 2020-12-18 | End: 2021-03-01 | Stop reason: SDUPTHER

## 2021-01-28 ENCOUNTER — APPOINTMENT (OUTPATIENT)
Dept: PHYSICAL THERAPY | Age: 61
End: 2021-01-28
Attending: ORTHOPAEDIC SURGERY

## 2021-02-04 ENCOUNTER — HOSPITAL ENCOUNTER (OUTPATIENT)
Dept: PHYSICAL THERAPY | Age: 61
Discharge: HOME | End: 2021-02-04
Attending: ORTHOPAEDIC SURGERY
Payer: COMMERCIAL

## 2021-02-04 PROCEDURE — 97162 PT EVAL MOD COMPLEX 30 MIN: CPT

## 2021-02-04 PROCEDURE — 97140 MANUAL THERAPY 1/> REGIONS: CPT

## 2021-02-04 PROCEDURE — 97110 THERAPEUTIC EXERCISES: CPT

## 2021-02-04 NOTE — PROGRESS NOTES
PT DAILY TREATMENT NOTE     Patient Name: Ehsan Ascencio  MMDL:[id number]  : 1960  [x]  Patient  Verified  Payor: LIZ / Plan: [address] / Product Type:  /    In time:2:06 PM  Out time:2:49 PM  Total Treatment Time (min): 43  Visit #: 1 of 8    Treatment Area: Pain in right shoulder [M25.511]    SUBJECTIVE  Pain Level (0-10 scale): 3  Any medication changes, allergies to medications, adverse drug reactions, diagnosis change, or new procedure performed?: [x] No    [] Yes (see summary sheet for update)  Subjective functional status/changes:   [] No changes reported  Patient agreeable to initial evaluation    OBJECTIVE        25 min [x]Eval                  []Re-Eval       10 min Therapeutic Exercise:  [x] See flow sheet :   Rationale: increase ROM and increase strength to improve the patients ability to complete self care tasks. 8 min Manual Therapy:    Left sidelying - right scapular clocks, DTM rhomboid  Supine - gentle traction, Grade I posterior/inferior GHJ mobilizations   The manual therapy interventions were performed at a separate and distinct time from the therapeutic activities interventions. Rationale: decrease pain, increase ROM and increase tissue extensibility to improve reaching for ADLs. With   [] TE   [] TA   [] neuro   [] other: Patient Education: [x] Review HEP    [] Progressed/Changed HEP based on:   [] positioning   [] body mechanics   [] transfers   [] heat/ice application    [] other:      Other Objective/Functional Measures: See evaluation     Pain Level (0-10 scale) post treatment: 4    ASSESSMENT/Changes in Function: See evaluation    Patient will continue to benefit from skilled PT services to modify and progress therapeutic interventions, address functional mobility deficits, address ROM deficits, address strength deficits, analyze and address soft tissue restrictions and analyze and cue movement patterns to attain remaining goals. [x]  See Plan of Care  []  See progress note/recertification  []  See Discharge Summary         Progress towards goals / Updated goals:  Short Term Goals: To be accomplished in 2 weeks:               1. Patient to be independent in HEP performance to maximize therapeutic benefit of care plan. IE: HEP provided     Long Term Goals: To be accomplished in 4 weeks:               1. Patient to improve right shoulder internal rotation to 60 degrees in 90 degree abduction to allow for greater ease in clothing management. IE: right shoulder internal rotation 33 degrees                2. Patient to demonstrate 5/5 pain free scaption and abduction for improved reaching ability. IE: 4-/5               3. Patient to report no difficulty with sleep in regards to chief complaint for improved quality of life. IE: Sleep interrupted 3-4 nights of the week               4. Patient to improve FOTO score to 67 to indicate progression towards pre-morbid level of function.    IE: 48  PLAN  []  Upgrade activities as tolerated     [x]  Continue plan of care  []  Update interventions per flow sheet       []  Discharge due to:_  []  Other:_      Alis Lowery, PT 2/4/2021  5:47 PM    Future Appointments   Date Time Provider Vernon Lawson   2/11/2021  2:00 PM Jonathan Callahan [facility]   2/18/2021  2:45 PM Tiffany Mcclain [place] [facility]   2/25/2021  2:00 PM Arielle Lux [facility]

## 2021-02-04 NOTE — PROGRESS NOTES
[facility]              [address]        [place], [address] [place]   [phone] fax    Plan of Care/ Statement of Necessity for Physical Therapy Services  Patient name: Osman Curtis Start of Care: 2021   Referral source: Edwina Gonzalez,* : 1960    Medical Diagnosis: Pain in right shoulder [M25.511]  Payor: LIZ / Plan: [address] / Product Type:  /  Onset Date:2020    Treatment Diagnosis: Right shoulder pain   Prior Hospitalization: see medical history Provider#: [id number]   Medications: Verified on Patient summary List    Comorbidities: Arthritis, HTN, low back pain    Prior Level of Function: Independent, highly active without pain, works full time as medical assistant     The [address] [place] [address] and following information is based on the information from the initial evaluation. Assessment/ key information: Patient is 62year old female with chief complaint in the right shoulder. X-ray imaging negative and prior management including injections which nearly absolved her pain. She is seeking physical therapy now as she is beginning to experience pain again and having difficulty with some daily tasks. Evaluation reveals painful arc beginning at 90 degrees abduction/scaption and pain with internal rotation. AROM symmetrical to left shoulder though with appreciable impairment in internal rotation (35 degrees). Gross strength of the right shoulder impaired due to pain. Impingement signs positive for pain provocation with tenderness to palpation at anterior joint capsule. Accessory joint movement reveals reduced mobility of the posterior capsule. Patient denies any crepitus or catching sensation with active movement. Signs and symptoms consistent with impingement type syndrome and proximal biceps tendinopathy.  Patient is excellent candidate for physical therapy and would benefit from skilled services to improve upon the aforementioned deficits for return to pre-morbid level. Evaluation Complexity History MEDIUM  Complexity : 1-2 comorbidities / personal factors will impact the outcome/ POC ; Examination MEDIUM Complexity : 3 Standardized tests and measures addressing body structure, function, activity limitation and / or participation in recreation  ;Presentation MEDIUM Complexity : Evolving with changing characteristics  ; Clinical Decision Making MEDIUM Complexity : FOTO score of 26-74  Overall Complexity Rating: MEDIUM  Problem List: pain affecting function, decrease ROM, decrease strength, decrease ADL/ functional abilitiies, decrease activity tolerance and decrease flexibility/ joint mobility   Treatment Plan may include any combination of the following: Therapeutic exercise, Therapeutic activities, Neuromuscular re-education, Manual therapy, Patient education and Self Care training  Patient / Family readiness to learn indicated by: asking questions, trying to perform skills and interest  Persons(s) to be included in education: patient (P)  Barriers to Learning/Limitations: None  Patient Goal (s): Reduce my pain, get back to how I was  Patient Self Reported Health Status: excellent  Rehabilitation Potential: excellent    Short Term Goals: To be accomplished in 2 weeks:   1. Patient to be independent in HEP performance to maximize therapeutic benefit of care plan. Long Term Goals: To be accomplished in 4 weeks:   1. Patient to improve right shoulder internal rotation to 60 degrees in 90 degree abduction to allow for greater ease in clothing management. 2. Patient to demonstrate 5/5 pain free scaption and abduction for improved reaching ability. 3. Patient to report no difficulty with sleep in regards to chief complaint for improved quality of life. 4. Patient to improve FOTO score to 67 to indicate progression towards pre-morbid level of function.     Frequency / Duration: Patient to be seen 1-2 times per week for 4 weeks. Patient/ Caregiver education and instruction: Diagnosis, prognosis, self care, activity modification and exercises   [x]  Plan of care has been reviewed with PTA    Cecilio Austin, PT 2/4/2021 5:25 PM  ________________________________________________________________________    I certify that the above Therapy Services are being furnished while the patient is under my care. I agree with the treatment plan and certify that this therapy is necessary.     Physician's Signature:____________Date:_________TIME:________    ** Signature, Date and Time must be completed for valid certification **  Please sign and return to [facility] [address]              [address] [facility]        [place], [address] [place]   [phone] fax

## 2021-02-11 ENCOUNTER — APPOINTMENT (OUTPATIENT)
Dept: PHYSICAL THERAPY | Age: 61
End: 2021-02-11
Attending: ORTHOPAEDIC SURGERY
Payer: COMMERCIAL

## 2021-02-18 ENCOUNTER — APPOINTMENT (OUTPATIENT)
Dept: PHYSICAL THERAPY | Age: 61
End: 2021-02-18
Attending: ORTHOPAEDIC SURGERY
Payer: COMMERCIAL

## 2021-02-19 ENCOUNTER — VIRTUAL VISIT (OUTPATIENT)
Dept: FAMILY MEDICINE CLINIC | Age: 61
End: 2021-02-19
Payer: COMMERCIAL

## 2021-02-19 DIAGNOSIS — F41.9 ANXIETY: Primary | ICD-10-CM

## 2021-02-19 DIAGNOSIS — F43.21 GRIEF: ICD-10-CM

## 2021-02-19 PROCEDURE — 99213 OFFICE O/P EST LOW 20 MIN: CPT | Performed by: FAMILY MEDICINE

## 2021-02-19 RX ORDER — HYDROXYZINE HYDROCHLORIDE 10 MG/1
10-20 TABLET, FILM COATED ORAL
Qty: 30 TAB | Refills: 1 | Status: SHIPPED | OUTPATIENT
Start: 2021-02-19 | End: 2021-03-01

## 2021-02-19 NOTE — PROGRESS NOTES
Jeaneth Michael is a 62 y.o. female who was seen by synchronous (real-time) audio-video technology on 2021 for Anxiety (Increase in anxiety. ( Father has passed on very recently) )        Assessment & Plan:   Diagnoses and all orders for this visit:    1. Anxiety  -     hydrOXYzine HCL (ATARAX) 10 mg tablet; Take 1-2 Tabs by mouth three (3) times daily as needed for Anxiety for up to 10 days. 2. Grief  -     hydrOXYzine HCL (ATARAX) 10 mg tablet; Take 1-2 Tabs by mouth three (3) times daily as needed for Anxiety for up to 10 days. The complexity of medical decision making for this visit is moderate   Follow-up and Dispositions    · Return in about 6 weeks (around 2021) for high blood pressure, anxiety, insomnia, lab review. 712  Subjective:   Patient contacted via doxy. me. Pt reports that her father  of covid pna one week ago. She was allowed to go and visit with him before his death. The wake will be tonight, the  will be tomorrow. Pt notes that her stomach has been in knots and she needs something to settle her nerves. She is having trouble sleeping as well. Pt's sister's presence has typically  been very disruptive due to her alcoholism. Pt has not been taking lunesta recently. She feels it has not been helping her sleep. She is now taking melatonin 10mg qhs. This does help her sleep. Pt has had both of her covid shots. Prior to Admission medications    Medication Sig Start Date End Date Taking? Authorizing Provider   hydrOXYzine HCL (ATARAX) 10 mg tablet Take 1-2 Tabs by mouth three (3) times daily as needed for Anxiety for up to 10 days. 2/19/21 3/1/21 Yes Ramsey Mullen MD   amLODIPine (NORVASC) 10 mg tablet Take 1 Tab by mouth daily. 20  Yes Ramsey Mullen MD   acetaminophen (TYLENOL) 325 mg tablet  20  Yes Provider, Historical   Biotin 2,500 mcg cap Take  by mouth.    Yes Provider, Historical   azelastine (ASTELIN) 137 mcg (0.1 %) nasal spray 1 [place] by Both Nostrils route two (2) times a day. Use in each nostril as directed 12/3/20  Yes Rah Rodriguez MD   fluticasone propionate (Flonase) 50 mcg/actuation nasal spray 2 Sprays by Both Nostrils route daily. 11/24/20  Yes Kendra Vasquez MD   cetirizine (ZYRTEC) 10 mg tablet Take 1 Tab by mouth daily. 11/24/20  Yes Kendra Vasquez MD   hydroCHLOROthiazide (HYDRODIURIL) 25 mg tablet Take 1 Tab by mouth daily. 10/26/20  Yes Kendra Vasquez MD   venlafaxine-SR [facility] 75 mg capsule Take 1 Cap by mouth daily. 10/1/20  Yes Kendra Vasquez MD   telmisartan (Micardis) 80 mg tablet Take 1 Tab by mouth daily. 9/2/20  Yes Kendra Vasquez MD   conjugated estrogens (PREMARIN) 0.3 mg tablet Take 1 Tab by mouth daily. 7/23/20  Yes Kendra Vasquez MD   esomeprazole (NEXIUM) 40 mg capsule TAKE 1 CAPSULE DAILY 4/6/20  Yes Rah Rodriguez MD   albuterol (PROVENTIL HFA, VENTOLIN HFA, PROAIR HFA) 90 mcg/actuation inhaler Take 2 Puffs by inhalation every four (4) hours as needed for Wheezing. 1/22/20  Yes Rah Rodriguez MD   PEDIATRIC MULTIVIT COMB #19/FA (CHILDREN'S MULTI-VIT GUMMIES PO) Take  by mouth two (2) times a day. Yes Provider, Historical   calcium carbonate (CALTREX) 600 mg (1,500 mg) tablet Take 600 mg by mouth daily. Yes Provider, Historical   Vitamin B Complex-Vit B12 1,200 mcg/mL Drop by SubLINGual route daily.      Yes Provider, Historical   lidocaine (LIDODERM) 5 %  12/12/20   Provider, Historical   cyclobenzaprine (FLEXERIL) 10 mg tablet  12/12/20   Provider, Historical     Patient Active Problem List   Diagnosis Code    Essential hypertension I10    Cerebral thrombosis with cerebral infarction ([facility] [address] I63.30    Hyperlipidemia E78.5    AR (allergic rhinitis) J30.9    GERD (gastroesophageal reflux disease) K21.9    S/P gastric bypass Z98.84    Thrombocytosis (HCC) D47.3     Current Outpatient Medications   Medication Sig Dispense Refill    hydrOXYzine HCL (ATARAX) 10 mg tablet Take 1-2 Tabs by mouth three (3) times daily as needed for Anxiety for up to 10 days. 30 Tab 1    amLODIPine (NORVASC) 10 mg tablet Take 1 Tab by mouth daily. 90 Tab 0    acetaminophen (TYLENOL) 325 mg tablet       Biotin 2,500 mcg cap Take  by mouth.  azelastine (ASTELIN) 137 mcg (0.1 %) nasal spray 1 [place] by Both Nostrils route two (2) times a day. Use in each nostril as directed 1 Bottle 0    fluticasone propionate (Flonase) 50 mcg/actuation nasal spray 2 Sprays by Both Nostrils route daily. 3 Bottle 3    cetirizine (ZYRTEC) 10 mg tablet Take 1 Tab by mouth daily. 90 Tab 1    hydroCHLOROthiazide (HYDRODIURIL) 25 mg tablet Take 1 Tab by mouth daily. 90 Tab 4    venlafaxine-SR (EFFEXOR-XR) 75 mg capsule Take 1 Cap by mouth daily. 90 Cap 1    telmisartan (Micardis) 80 mg tablet Take 1 Tab by mouth daily. 90 Tab 3    conjugated estrogens (PREMARIN) 0.3 mg tablet Take 1 Tab by mouth daily. 90 Tab 1    esomeprazole (NEXIUM) 40 mg capsule TAKE 1 CAPSULE DAILY 90 Cap 3    albuterol (PROVENTIL HFA, VENTOLIN HFA, PROAIR HFA) 90 mcg/actuation inhaler Take 2 Puffs by inhalation every four (4) hours as needed for Wheezing. 1 Inhaler 0    PEDIATRIC MULTIVIT COMB #19/FA (CHILDREN'S MULTI-VIT GUMMIES PO) Take  by mouth two (2) times a day.  calcium carbonate (CALTREX) 600 mg (1,500 mg) tablet Take 600 mg by mouth daily.  Vitamin B Complex-Vit B12 1,200 mcg/mL Drop by SubLINGual route daily.         lidocaine (LIDODERM) 5 %       cyclobenzaprine (FLEXERIL) 10 mg tablet        Allergies   Allergen Reactions    Tioconazole Itching     Past Medical History:   Diagnosis Date    AR (allergic rhinitis)     Cerebral thrombosis with cerebral infarction ([facility] [address] 9/10    Depression     GERD (gastroesophageal reflux disease) 7/5/2011    HTN (hypertension)     Hyperlipidemia     Ruptured appendix     Uterine prolapse      Past Surgical History:   Procedure Laterality Date    HX APPENDECTOMY  11/2/13 [facility]    HX BREAST REDUCTION  2002    HX GASTRIC BYPASS  12    HX HAMMER [address]    right 5th toe    HX TOTAL ABDOMINAL HYSTERECTOMY  2004    DE LAP,CHOLECYSTECTOMY  2002    [address]  2018    [facility]     Family History   Problem Relation Age of Onset    Hypertension Mother     Heart Attack Mother     Arthritis-osteo Father     Cancer Father         prostate    Hypertension Father     Stroke Father         CVA    Pneumonia Father 80        covid pna 2021    Heart Attack Maternal Grandmother         MI    Osteoporosis Maternal Grandfather         Alzheimer's Disease    Other Paternal Grandmother     Heart Attack Paternal Grandfather         MI    Other Other         1 sib complications from Gastric Bypass    Hypertension Other         1 sib    Stroke Brother      Social History     Tobacco Use    Smoking status: Former Smoker     Packs/day: 0.50     Years: 15.00     Pack years: 7.50     Types: Cigarettes     Quit date: 2002     Years since quittin.1    Smokeless tobacco: Never Used   Substance Use Topics    Alcohol use: Yes     Comment: occassional wine       Review of Systems   Constitutional: Negative. HENT: Negative. Respiratory: Negative. Cardiovascular: Negative. Psychiatric/Behavioral: The patient is nervous/anxious. All other systems reviewed and are negative. Objective:   No flowsheet data found.    General: alert, cooperative, no distress   Mental  status: normal mood, behavior, speech, dress, motor activity, and thought processes, able to follow commands   HENT: NCAT   Neck: no visualized mass   Resp: no respiratory distress   Neuro: no gross deficits   Skin: no discoloration or lesions of concern on visible areas   Psychiatric: normal affect, consistent with stated mood, no evidence of hallucinations     Additional exam findings: none      We discussed the expected course, resolution and complications of the diagnosis(es) in detail. Medication risks, benefits, costs, interactions, and alternatives were discussed as indicated. I advised her to contact the office if her condition worsens, changes or fails to improve as anticipated. She expressed understanding with the diagnosis(es) and plan. Linnea Agrawal, who was evaluated through a patient-initiated, synchronous (real-time) audio-video encounter, and/or her healthcare decision maker, is aware that it is a billable service, with coverage as determined by her insurance carrier. She provided verbal consent to proceed: n/a- consent obtained within past 12 months, and patient identification was verified. It was conducted pursuant to the emergency declaration under the [address] [place] [address] authority and the [employer] General Act. A caregiver was present when appropriate. Ability to conduct physical exam was limited. I was in the office. The patient was at home.       Sonya Jenkins MD

## 2021-02-19 NOTE — PROGRESS NOTES
Charlesjerman Ascencio presents today for   Chief Complaint   Patient presents with    Anxiety     Increase in anxiety. ( Father has passed on very recently)        Virtual/telephone visit    Depression Screening:  3 most recent PHQ Screens 2/19/2021   Little interest or pleasure in doing things Several days   Feeling down, depressed, irritable, or hopeless Several days   Total Score PHQ 2 2       Learning Assessment:  Learning Assessment 2/19/2021   PRIMARY LEARNER Patient   HIGHEST LEVEL OF EDUCATION - PRIMARY LEARNER  SOME COLLEGE   BARRIERS PRIMARY LEARNER NONE   CO-LEARNER CAREGIVER No   PRIMARY LANGUAGE ENGLISH   LEARNER PREFERENCE PRIMARY READING     -     -     -     -   ANSWERED BY Patient   RELATIONSHIP SELF       Travel Screening:   Travel Screening      No screening recorded since 02/18/21 0000      Travel History   Travel since 01/19/21     No documented travel since 01/19/21          Health Maintenance reviewed and discussed and ordered per Provider. Health Maintenance Due   Topic Date Due    Pneumococcal 0-64 years (1 of 1 - PPSV23) 10/13/1967    COVID-19 Vaccine (1 of 2) 10/13/1977    DTaP/Tdap/Td series (1 - Tdap) 10/13/1982    Shingrix Vaccine Age 50> (1 of 2) 10/13/2011    Flu Vaccine (1) 09/01/2020    Breast Cancer Screen Mammogram  03/12/2021   . Coordination of Care:  1. Have you been to the ER, urgent care clinic since your last visit? Hospitalized since your last visit? No    2. Have you seen or consulted any other health care providers outside of the [address] [place] [address] since your last visit? Include any pap smears or colon screening.  No

## 2021-03-01 DIAGNOSIS — N95.9 MENOPAUSAL DISORDER: ICD-10-CM

## 2021-03-01 DIAGNOSIS — I10 ESSENTIAL HYPERTENSION: ICD-10-CM

## 2021-03-01 DIAGNOSIS — N95.1 HOT FLASHES DUE TO MENOPAUSE: ICD-10-CM

## 2021-03-01 NOTE — TELEPHONE ENCOUNTER
Pt needs rx refill. Please advise   Requested Prescriptions     Pending Prescriptions Disp Refills    amLODIPine (NORVASC) 10 mg tablet 90 Tab 0     Sig: Take 1 Tab by mouth daily.  conjugated estrogens (PREMARIN) 0.3 mg tablet 90 Tab 1     Sig: Take 1 Tab by mouth daily.  hydroCHLOROthiazide (HYDRODIURIL) 25 mg tablet 90 Tab 4     Sig: Take 1 Tab by mouth daily.  venlafaxine-SR (EFFEXOR-XR) 75 mg capsule 90 Cap 1     Sig: Take 1 Cap by mouth daily.

## 2021-03-02 RX ORDER — HYDROCHLOROTHIAZIDE 25 MG/1
25 TABLET ORAL DAILY
Qty: 90 TAB | Refills: 1 | Status: SHIPPED | OUTPATIENT
Start: 2021-03-02 | End: 2021-04-14 | Stop reason: ALTCHOICE

## 2021-03-02 RX ORDER — AMLODIPINE BESYLATE 10 MG/1
10 TABLET ORAL DAILY
Qty: 90 TAB | Refills: 1 | Status: SHIPPED | OUTPATIENT
Start: 2021-03-02 | End: 2021-08-13 | Stop reason: SDUPTHER

## 2021-03-02 RX ORDER — VENLAFAXINE HYDROCHLORIDE 75 MG/1
75 CAPSULE, EXTENDED RELEASE ORAL DAILY
Qty: 90 CAP | Refills: 1 | Status: SHIPPED | OUTPATIENT
Start: 2021-03-02 | End: 2021-09-10 | Stop reason: SDUPTHER

## 2021-03-09 NOTE — PROGRESS NOTES
[facility]              [address]        [place], [address] [place]   [phone] fax    Discharge Summary  Patient name: Poppy Iglesias Start of Care: 2021   Referral source: Janki Moreland,* : 1960   Medical/Treatment Diagnosis: Pain in right shoulder [M25.511]  Payor: OPTIMA / Plan: [place] OPTIMA  CAPITATED PT / Product Type: Commerical /  Onset Date:2020     Prior Hospitalization: see medical history Provider#: [id number]   Medications: Verified on Patient Summary List    Comorbidities: Arthritis, HTN, low back pain        Prior Level of Function: Independent, highly active without pain, works full time as medical assistant    Visits from [facility]: 1    Missed Visits: 2  Reporting Period : 2021 to 3/9/2021    Summary of Care:  Short Term Goals: To be accomplished in 2 weeks:               1. Patient to be independent in HEP performance to maximize therapeutic benefit of care plan. IE: HEP provided     Long Term Goals: To be accomplished in 4 weeks:               1. Patient to improve right shoulder internal rotation to 60 degrees in 90 degree abduction to allow for greater ease in clothing management. IE: right shoulder internal rotation 33 degrees                2. Patient to demonstrate 5/5 pain free scaption and abduction for improved reaching ability. IE: 4-/5               3. Patient to report no difficulty with sleep in regards to chief complaint for improved quality of life. IE: Sleep interrupted 3-4 nights of the week               4. Patient to improve FOTO score to 67 to indicate progression towards pre-morbid level of function. IE: 48    Unable to update goals. Patient failed to show for her two scheduled appointments. We have been unable to contact this patient.     ASSESSMENT/RECOMMENDATIONS:  [x]Discontinue therapy: []Patient has reached or is progressing toward set goals      [x]Patient is non-compliant or has abdicated      []Due to lack of appreciable progress towards set goals    Wale Owen, PT 3/9/2021 12:25 PM

## 2021-03-11 DIAGNOSIS — I10 ESSENTIAL HYPERTENSION: ICD-10-CM

## 2021-03-11 DIAGNOSIS — J01.00 ACUTE NON-RECURRENT MAXILLARY SINUSITIS: ICD-10-CM

## 2021-03-11 RX ORDER — AZELASTINE 1 MG/ML
1 SPRAY, METERED NASAL 2 TIMES DAILY
Qty: 1 BOTTLE | Refills: 0 | Status: SHIPPED | OUTPATIENT
Start: 2021-03-11 | End: 2021-03-17 | Stop reason: SDUPTHER

## 2021-03-11 RX ORDER — TELMISARTAN 80 MG/1
80 TABLET ORAL DAILY
Qty: 90 TAB | Refills: 3 | Status: SHIPPED | OUTPATIENT
Start: 2021-03-11 | End: 2021-04-14 | Stop reason: ALTCHOICE

## 2021-03-11 NOTE — TELEPHONE ENCOUNTER
Pt needs rx refill. Please advise   Requested Prescriptions     Pending Prescriptions Disp Refills    telmisartan (Micardis) 80 mg tablet 90 Tab 3     Sig: Take 1 Tab by mouth daily.  azelastine (ASTELIN) 137 mcg (0.1 %) nasal spray 1 Bottle 0     [place] by Both Nostrils route two (2) times a day. Use in each nostril as directed     She also stated she needs another referral for [facility] in [place] for her shoulder she said you were aware of this shoulder issue.

## 2021-03-17 DIAGNOSIS — J01.00 ACUTE NON-RECURRENT MAXILLARY SINUSITIS: ICD-10-CM

## 2021-03-17 DIAGNOSIS — G47.00 INSOMNIA, UNSPECIFIED TYPE: ICD-10-CM

## 2021-03-17 RX ORDER — AZELASTINE 1 MG/ML
1 SPRAY, METERED NASAL 2 TIMES DAILY
Qty: 3 BOTTLE | Refills: 4 | Status: SHIPPED | OUTPATIENT
Start: 2021-03-17 | End: 2022-01-11 | Stop reason: SDUPTHER

## 2021-03-17 RX ORDER — ESZOPICLONE 3 MG/1
3 TABLET, FILM COATED ORAL
Qty: 90 TAB | Refills: 0 | Status: SHIPPED | OUTPATIENT
Start: 2021-03-17 | End: 2021-06-02 | Stop reason: SDUPTHER

## 2021-03-17 NOTE — TELEPHONE ENCOUNTER
Patient need a medication refill for Lunesta and also these other medications. Please advise    Requested Prescriptions     Pending Prescriptions Disp Refills    azelastine (ASTELIN) 137 mcg (0.1 %) nasal spray 1 Bottle 0     Si [place] by Both Nostrils route two (2) times a day.  Use in each nostril as directed

## 2021-06-02 DIAGNOSIS — G47.00 INSOMNIA, UNSPECIFIED TYPE: ICD-10-CM

## 2021-06-02 DIAGNOSIS — N95.9 MENOPAUSAL DISORDER: ICD-10-CM

## 2021-06-02 NOTE — TELEPHONE ENCOUNTER
Patient need a medication refill. She would like to have Conjugated Estrogens sent to the [facility]. And the Lunesta sent to the [facility].  Please advise

## 2021-06-04 RX ORDER — ESZOPICLONE 3 MG/1
3 TABLET, FILM COATED ORAL
Qty: 90 TABLET | Refills: 0 | Status: SHIPPED | OUTPATIENT
Start: 2021-06-04 | End: 2021-08-25 | Stop reason: SDUPTHER

## 2021-06-08 ENCOUNTER — OFFICE VISIT (OUTPATIENT)
Dept: ORTHOPEDIC SURGERY | Age: 61
End: 2021-06-08
Payer: COMMERCIAL

## 2021-06-08 VITALS
OXYGEN SATURATION: 97 % | HEIGHT: 62 IN | BODY MASS INDEX: 30.62 KG/M2 | WEIGHT: 166.4 LBS | HEART RATE: 88 BPM | TEMPERATURE: 97 F

## 2021-06-08 DIAGNOSIS — M54.2 NECK PAIN: ICD-10-CM

## 2021-06-08 DIAGNOSIS — M75.101 TEAR OF RIGHT ROTATOR CUFF, UNSPECIFIED TEAR EXTENT, UNSPECIFIED WHETHER TRAUMATIC: Primary | ICD-10-CM

## 2021-06-08 DIAGNOSIS — M54.9 BACK PAIN, UNSPECIFIED BACK LOCATION, UNSPECIFIED BACK PAIN LATERALITY, UNSPECIFIED CHRONICITY: ICD-10-CM

## 2021-06-08 PROCEDURE — 99214 OFFICE O/P EST MOD 30 MIN: CPT | Performed by: ORTHOPAEDIC SURGERY

## 2021-06-08 NOTE — PROGRESS NOTES
Abigail Durant  1960   Chief Complaint   Patient presents with    Shoulder Pain     right        HISTORY OF PRESENT ILLNESS  Abigail Durant is a 62 y.o. female who presents today for reevaluation of right shoulder. Patient rates pain as 2/10 today. Pain has been present for awhile. Was last seen here for this in December and had a right shoulder cortisone injection. Injection only lasted a couple of weeks. Was also referred to PT at last [facility] in December. She went to 1 session of PT. She also thinks she has neck and back issues, would like to get this checked out. Patient denies any fever, chills, chest pain, shortness of breath or calf pain. The remainder of the review of systems is negative. There are no new illness or injuries to report since last seen in the office. There are no changes to medications, allergies, family or social history. Pain Assessment  6/8/2021   Location of Pain Shoulder   Location Modifiers Right   Severity of Pain 2   Quality of Pain Aching   Duration of Pain Persistent   Frequency of Pain Constant   Aggravating Factors Other (Comment)   Aggravating Factors Comment using arm and laying down   Limiting Behavior Yes   Relieving Factors Other (Comment)   Relieving Factors Comment tylenol and salonpas or lydoderm patches   Result of Injury No     PHYSICAL EXAM:   Visit Vitals  Pulse 88   Temp 97 °F (36.1 °C) (Skin)   Ht 5' 2\" (1.575 m)   Wt 166 lb 6.4 oz (75.5 kg)   LMP 12/31/1996   SpO2 97%   BMI 30.43 kg/m²     The patient is a well-developed, well-nourished female   in no acute distress. The patient is alert and oriented times three. The patient is alert and oriented times three. Mood and affect are normal.  LYMPHATIC: lymph nodes are not enlarged and are within normal limits  SKIN: normal in color and non tender to palpation. There are no bruises or abrasions noted. NEUROLOGICAL: Motor sensory exam is within normal limits. Reflexes are equal bilaterally.  There is normal sensation to pinprick and light touch  MUSCULOSKELETAL:  Examination Right shoulder   Skin Intact   AC joint tenderness -   Biceps tenderness -   Forward flexion/Elevation    Active abduction    Glenohumeral abduction 80   External rotation ROM 45   Internal rotation ROM 30   Apprehension -   Summers Relocation -   Jerk -   Load and Shift -   Obriens -   Speeds -   Impingement sign +   Supraspinatus/Empty Can +   External Rotation Strength -, 5/5   Lift Off/Belly Press -, 5/5   Neurovascular Intact       IMAGING: X-rays obtained at [facility] show no acute abnormalities      IMPRESSION:      ICD-10-CM ICD-9-CM    1. Tear of right rotator cuff, unspecified tear extent, unspecified whether traumatic  M75.101 840.4 MRI SHOULDER RT WO CONT   2. Neck pain  M54.2 723.1 REFERRAL TO SPINE SURGERY   3. Back pain, unspecified back location, unspecified back pain laterality, unspecified chronicity  M54.9 724.5 REFERRAL TO SPINE SURGERY        PLAN:   1. Pt presents today with persistent right shoulder pain and I am ordering an MRI to r/o a RCT. She also complains of neck and back pain, will refer her to the [address] [place] [address] to address further treatment for this. Risk factors include: htn  2. No ultrasound exam indicated today  3. No cortisone injection indicated today   4. No Physical/Occupational Therapy indicated today  5. Yes diagnostic test indicated today: MRI R SHOULDER  6. No durable medical equipment indicated today  7. Yes referral indicated today [place]  8. No medications indicated today:   9. No Narcotic indicated today       RTC following MRI      Scribed by Isidro Medina) as dictated by Iliana Bruce MD    I, Dr. Iliana Bruce, confirm that all documentation is accurate.     Iliana Bruce M.D.   [facility]

## 2021-06-25 ENCOUNTER — TELEPHONE (OUTPATIENT)
Dept: ORTHOPEDIC SURGERY | Age: 61
End: 2021-06-25

## 2021-06-25 NOTE — TELEPHONE ENCOUNTER
Patient called stating she wants to get her MRI completed at [facility] now instead. She says she was scheduled at [facility] but she wasn't able to complete the MRI because she was told the wrong day to come for the appointment. She's not pleased with this and so she wants to switch her location. She wants to be contacted for scheduling at [phone].

## 2021-06-25 NOTE — TELEPHONE ENCOUNTER
Patients order, demographics and office note were faxed to [facility] requesting appt .   Tel# [phone] fax# [phone]

## 2021-06-28 ENCOUNTER — VIRTUAL VISIT (OUTPATIENT)
Dept: FAMILY MEDICINE CLINIC | Age: 61
End: 2021-06-28
Payer: COMMERCIAL

## 2021-06-28 DIAGNOSIS — J01.40 ACUTE NON-RECURRENT PANSINUSITIS: Primary | ICD-10-CM

## 2021-06-28 PROCEDURE — 99213 OFFICE O/P EST LOW 20 MIN: CPT | Performed by: FAMILY MEDICINE

## 2021-06-28 RX ORDER — CIPROFLOXACIN 250 MG/1
250 TABLET, FILM COATED ORAL EVERY 12 HOURS
COMMUNITY
Start: 2021-06-17 | End: 2021-09-10 | Stop reason: ALTCHOICE

## 2021-06-28 RX ORDER — AMOXICILLIN AND CLAVULANATE POTASSIUM 875; 125 MG/1; MG/1
1 TABLET, FILM COATED ORAL 2 TIMES DAILY
Qty: 20 TABLET | Refills: 0 | Status: SHIPPED | OUTPATIENT
Start: 2021-06-28 | End: 2021-07-08

## 2021-06-28 RX ORDER — FLUCONAZOLE 150 MG/1
TABLET ORAL
COMMUNITY
Start: 2021-06-17 | End: 2021-09-10 | Stop reason: ALTCHOICE

## 2021-06-28 NOTE — PROGRESS NOTES
Parker Rico is a 62 y.o. female who was seen by synchronous (real-time) audio-video technology on 6/28/2021 for Pressure Behind the Eyes, Nasal Congestion (c/o chest congestion for about 1 month ), and Cough (c/o productive cough for about a month with green mucus. )        Assessment & Plan:   Diagnoses and all orders for this visit:    1. Acute non-recurrent pansinusitis  -     amoxicillin-clavulanate (AUGMENTIN) 875-125 mg per tablet; Take 1 Tablet by mouth two (2) times a day for 10 days. The complexity of medical decision making for this visit is moderate   Follow-up and Dispositions    · Return if symptoms worsen or fail to improve. 712  Subjective:   Pt reports one month hx of sinus congestion. She has been taking her zyrtec and using her nasal spray. She still has runny nose and has a cough productive of green mucus. She has ha and sinus pressure. She has been using a netti pot as well. She was recently on cipro for uti sx but it did not help with this at all. Prior to Admission medications    Medication Sig Start Date End Date Taking? Authorizing Provider   ciprofloxacin HCl (CIPRO) 250 mg tablet Take 250 mg by mouth every twelve (12) hours. 6/17/21  Yes Provider, Historical   fluconazole (DIFLUCAN) 150 mg tablet Take 1 tablet by mouth once for one dose. May repeat in 7 days if symptoms persist. 6/17/21  Yes Provider, Historical   amoxicillin-clavulanate (AUGMENTIN) 875-125 mg per tablet Take 1 Tablet by mouth two (2) times a day for 10 days. 6/28/21 7/8/21 Yes Ladarius Cook MD   conjugated estrogens (PREMARIN) 0.3 mg tablet Take 1 Tablet by mouth daily. 6/4/21  Yes Ladarius Cook MD   eszopiclone (LUNESTA) 3 mg tablet Take 1 Tablet by mouth nightly as needed for Sleep. Max Daily Amount: 3 mg. 6/4/21  Yes Ladarius Cook MD   telmisartan-hydroCHLOROthiazide (MICARDIS HCT) 80-25 mg per tablet Take 1 Tab by mouth daily.  4/14/21  Yes Ladarius Cook MD   azelastine (ASTELIN) 137 mcg (0.1 %) nasal spray 1 [place] by Both Nostrils route two (2) times a day. Use in each nostril as directed 3/17/21  Yes Norma Rodriguez MD   amLODIPine (NORVASC) 10 mg tablet Take 1 Tab by mouth daily. 3/2/21  Yes Oliver Mondragon MD   venlafaxine-SR [facility] 75 mg capsule Take 1 Cap by mouth daily. 3/2/21  Yes Oliver Mondragon MD   esomeprazole (NEXIUM) 40 mg capsule TAKE 1 CAPSULE DAILY 3/1/21  Yes Oliver Mondragon MD   acetaminophen (TYLENOL) 325 mg tablet  12/12/20  Yes Provider, Historical   Biotin 2,500 mcg cap Take  by mouth. Yes Provider, Historical   fluticasone propionate (Flonase) 50 mcg/actuation nasal spray 2 Sprays by Both Nostrils route daily. 11/24/20  Yes Oliver Mondragon MD   cetirizine (ZYRTEC) 10 mg tablet Take 1 Tab by mouth daily. 11/24/20  Yes Oliver Mondragon MD   albuterol (PROVENTIL HFA, VENTOLIN HFA, PROAIR HFA) 90 mcg/actuation inhaler Take 2 Puffs by inhalation every four (4) hours as needed for Wheezing. 1/22/20  Yes Norma Rodriguez MD   PEDIATRIC MULTIVIT COMB #19/FA (CHILDREN'S MULTI-VIT GUMMIES PO) Take  by mouth two (2) times a day. Yes Provider, Historical   calcium carbonate (CALTREX) 600 mg (1,500 mg) tablet Take 600 mg by mouth daily. Yes Provider, Historical   Vitamin B Complex-Vit B12 1,200 mcg/mL Drop by SubLINGual route daily. Yes Provider, Historical   lidocaine (LIDODERM) 5 %  12/12/20   Provider, Historical     Patient Active Problem List   Diagnosis Code    Essential hypertension I10    Cerebral thrombosis with cerebral infarction ([facility] [address] [address] I63.30    Hyperlipidemia E78.5    AR (allergic rhinitis) J30.9    GERD (gastroesophageal reflux disease) K21.9    S/P gastric bypass Z98.84    Thrombocytosis (HCC) D47.3     Current Outpatient Medications   Medication Sig Dispense Refill    ciprofloxacin HCl (CIPRO) 250 mg tablet Take 250 mg by mouth every twelve (12) hours.       fluconazole (DIFLUCAN) 150 mg tablet Take 1 tablet by mouth once for one dose. May repeat in 7 days if symptoms persist.      amoxicillin-clavulanate (AUGMENTIN) 875-125 mg per tablet Take 1 Tablet by mouth two (2) times a day for 10 days. 20 Tablet 0    conjugated estrogens (PREMARIN) 0.3 mg tablet Take 1 Tablet by mouth daily. 90 Tablet 1    eszopiclone (LUNESTA) 3 mg tablet Take 1 Tablet by mouth nightly as needed for Sleep. Max Daily Amount: 3 mg. 90 Tablet 0    telmisartan-hydroCHLOROthiazide (MICARDIS HCT) 80-25 mg per tablet Take 1 Tab by mouth daily. 90 Tab 4    azelastine (ASTELIN) 137 mcg (0.1 %) nasal spray 1 [place] by Both Nostrils route two (2) times a day. Use in each nostril as directed 3 Bottle 4    amLODIPine (NORVASC) 10 mg tablet Take 1 Tab by mouth daily. 90 Tab 1    venlafaxine-SR (EFFEXOR-XR) 75 mg capsule Take 1 Cap by mouth daily. 90 Cap 1    esomeprazole (NEXIUM) 40 mg capsule TAKE 1 CAPSULE DAILY 90 Cap 3    acetaminophen (TYLENOL) 325 mg tablet       Biotin 2,500 mcg cap Take  by mouth.  fluticasone propionate (Flonase) 50 mcg/actuation nasal spray 2 Sprays by Both Nostrils route daily. 3 Bottle 3    cetirizine (ZYRTEC) 10 mg tablet Take 1 Tab by mouth daily. 90 Tab 1    albuterol (PROVENTIL HFA, VENTOLIN HFA, PROAIR HFA) 90 mcg/actuation inhaler Take 2 Puffs by inhalation every four (4) hours as needed for Wheezing. 1 Inhaler 0    PEDIATRIC MULTIVIT COMB #19/FA (CHILDREN'S MULTI-VIT GUMMIES PO) Take  by mouth two (2) times a day.  calcium carbonate (CALTREX) 600 mg (1,500 mg) tablet Take 600 mg by mouth daily.  Vitamin B Complex-Vit B12 1,200 mcg/mL Drop by SubLINGual route daily.         lidocaine (LIDODERM) 5 %  (Patient not taking: Reported on 6/28/2021)       Allergies   Allergen Reactions    Tioconazole Itching     Past Medical History:   Diagnosis Date    AR (allergic rhinitis)     Cerebral thrombosis with cerebral infarction ([facility] [address] 9/10    Depression     GERD (gastroesophageal reflux disease) 7/5/2011    HTN (hypertension)     Hyperlipidemia     Right shoulder pain     Ruptured appendix     Uterine prolapse      Past Surgical History:   Procedure Laterality Date    HX APPENDECTOMY  13    [facility]    HX BREAST REDUCTION      HX GASTRIC BYPASS  12    HX HAMMER TOE REPAIR  1986    right 5th toe    HX TOTAL ABDOMINAL HYSTERECTOMY  2004    [place] LAP,CHOLECYSTECTOMY     [address]  2018    [facility]     Family History   Problem Relation Age of Onset    Hypertension Mother     Heart Attack Mother     Arthritis-osteo Father     Cancer Father         prostate    Hypertension Father     Stroke Father         CVA    Pneumonia Father 80        covid pna 2021    Heart Attack Maternal Grandmother         MI    Osteoporosis Maternal Grandfather         Alzheimer's Disease    Other Paternal Grandmother     Heart Attack Paternal Grandfather         MI    Other Other         1 sib complications from Gastric Bypass    Hypertension Other         1 sib    Stroke Brother      Social History     Tobacco Use    Smoking status: Former Smoker     Packs/day: 0.50     Years: 15.00     Pack years: 7.50     Types: Cigarettes     Quit date: 2002     Years since quittin.5    Smokeless tobacco: Never Used   Substance Use Topics    Alcohol use: Yes     Comment: occassional wine       Review of Systems   Constitutional: Negative. HENT: Positive for sinus pain. Respiratory: Positive for cough and sputum production. Cardiovascular: Negative. Neurological: Positive for headaches. All other systems reviewed and are negative. Objective:   No flowsheet data found.    General: alert, cooperative, no distress   Mental  status: normal mood, behavior, speech, dress, motor activity, and thought processes, able to follow commands   HENT: NCAT   Neck: no visualized mass   Resp: no respiratory distress   Neuro: no gross deficits   Skin: no discoloration or lesions of concern on visible areas   Psychiatric: normal affect, consistent with stated mood, no evidence of hallucinations     Additional exam findings: none      We discussed the expected course, resolution and complications of the diagnosis(es) in detail. Medication risks, benefits, costs, interactions, and alternatives were discussed as indicated. I advised her to contact the office if her condition worsens, changes or fails to improve as anticipated. She expressed understanding with the diagnosis(es) and plan. Jeaneth Jean, was evaluated through a synchronous (real-time) audio-video encounter. The patient (or guardian if applicable) is aware that this is a billable service. Verbal consent to proceed has been obtained within the past 12 months. The visit was conducted pursuant to the emergency declaration under the [address] [place] [address], [address] [place] [address] authority and the [employer] General Act. Patient identification was verified, and a caregiver was present when appropriate. The patient was located in a state where the provider was credentialed to provide care.     Govind Alfaro MD

## 2021-06-28 NOTE — PROGRESS NOTES
Roro Deras presents today for   Chief Complaint   Patient presents with    Pressure Behind the Eyes    Nasal Congestion     c/o chest congestion for about 1 month     Cough     c/o productive cough for about a month with green mucus. Virtual/telephone visit    Depression Screening:  3 most recent PHQ Screens 4/14/2021   Little interest or pleasure in doing things Several days   Feeling down, depressed, irritable, or hopeless Several days   Total Score PHQ 2 2       Learning Assessment:  Learning Assessment 2/19/2021   PRIMARY LEARNER Patient   HIGHEST LEVEL OF EDUCATION - PRIMARY LEARNER  SOME COLLEGE   BARRIERS PRIMARY LEARNER NONE   CO-LEARNER CAREGIVER No   PRIMARY LANGUAGE ENGLISH   LEARNER PREFERENCE PRIMARY READING     -     -     -     -   ANSWERED BY Patient   RELATIONSHIP SELF       Travel Screening:   Travel Screening     Question   Response    In the last month, have you been in contact with someone who was confirmed or suspected to have [place] / COVID-19? No / Unsure    Have you had a COVID-19 viral test in the last 14 days? No    Do you have any of the following new or worsening symptoms? Cough;Runny nose    Have you traveled internationally or domestically in the last month? No      Travel History   Travel since 05/28/21     No documented travel since 05/28/21          Health Maintenance reviewed and discussed and ordered per Provider. Health Maintenance Due   Topic Date Due    COVID-19 Vaccine (1) Never done    DTaP/Tdap/Td series (1 - Tdap) Never done    Shingrix Vaccine Age 50> (1 of 2) Never done   . Coordination of Care:  1. Have you been to the ER, urgent care clinic since your last visit? Hospitalized since your last visit? No    2. Have you seen or consulted any other health care providers outside of the [address] [place] [address] since your last visit? Include any pap smears or colon screening.  No

## 2021-06-28 NOTE — LETTER
NOTIFICATION RETURN TO WORK / SCHOOL    6/28/2021 2:30 PM    Ms. Abigail Durant  [address] [facility] [address]      To Whom It May Concern:    Abigail Durant is currently under the care of Rishabh Cornelius. She will return to work/school on: 6/29/21. If there are questions or concerns please have the patient contact our office.         Sincerely,      Carissa Waldrop MD

## 2021-06-30 ENCOUNTER — TELEPHONE (OUTPATIENT)
Dept: ORTHOPEDIC SURGERY | Age: 61
End: 2021-06-30

## 2021-06-30 NOTE — LETTER
NOTIFICATION RETURN TO WORK / SCHOOL    7/1/2021 9:33 AM    Ms. Rayray Preston  [address] [facility] [address]      To Whom It May Concern:    Rayray Preston is currently under the care of Marquise Naveen Sood. She is currently being treated for right shoulder pain and is unable to participate in the refresher classes for CPR at this time. She will be re evaluated after her MRI on 7/11/21. If there are questions or concerns please have the patient contact our office.         Sincerely,      Fabio Fischer MD

## 2021-06-30 NOTE — TELEPHONE ENCOUNTER
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 She says since she won't find out what's wrong with her shoulder until her MRI on 21 she wants us to compose a letter for her that says she can't participate in the refresher classes due to her shoulder pain. She says she needs this letter by the end of today and she's asking if we can fax it to her at her job at fax number [phone] Attn: Merle Esposito. Please advise.

## 2021-07-13 ENCOUNTER — OFFICE VISIT (OUTPATIENT)
Dept: ORTHOPEDIC SURGERY | Age: 61
End: 2021-07-13
Payer: COMMERCIAL

## 2021-07-13 VITALS
BODY MASS INDEX: 29.52 KG/M2 | TEMPERATURE: 97.4 F | RESPIRATION RATE: 18 BRPM | WEIGHT: 166.6 LBS | HEIGHT: 63 IN | HEART RATE: 77 BPM | OXYGEN SATURATION: 97 %

## 2021-07-13 DIAGNOSIS — M75.121 COMPLETE TEAR OF RIGHT ROTATOR CUFF, UNSPECIFIED WHETHER TRAUMATIC: Primary | ICD-10-CM

## 2021-07-13 PROCEDURE — 99214 OFFICE O/P EST MOD 30 MIN: CPT | Performed by: ORTHOPAEDIC SURGERY

## 2021-07-13 PROCEDURE — 20611 DRAIN/INJ JOINT/BURSA W/US: CPT | Performed by: ORTHOPAEDIC SURGERY

## 2021-07-13 RX ORDER — TRIAMCINOLONE ACETONIDE 40 MG/ML
40 INJECTION, SUSPENSION INTRA-ARTICULAR; INTRAMUSCULAR ONCE
Status: COMPLETED | OUTPATIENT
Start: 2021-07-13 | End: 2021-07-13

## 2021-07-13 RX ADMIN — TRIAMCINOLONE ACETONIDE 40 MG: 40 INJECTION, SUSPENSION INTRA-ARTICULAR; INTRAMUSCULAR at 11:34

## 2021-07-13 NOTE — PROGRESS NOTES
Rochelle Bain  1960   Chief Complaint   Patient presents with    Shoulder Pain     right shoulder MRI results f/u        HISTORY OF PRESENT ILLNESS  Rochelle Bain is a 62 y.o. female who presents today for reevaluation of right shoulder and MRI review. Patient rates pain as 3/10 today. Pain has been present for awhile. Was seen here for this in December and had a right shoulder cortisone injection. Injection only lasted a couple of weeks. Was also referred to PT in December. She went to 1 session of PT. Patient denies any fever, chills, chest pain, shortness of breath or calf pain. The remainder of the review of systems is negative. There are no new illness or injuries to report since last seen in the office. There are no changes to medications, allergies, family or social history. Pain Assessment  7/13/2021   Location of Pain Shoulder   Location Modifiers Right   Severity of Pain 3   Quality of Pain Throbbing   Duration of Pain Persistent   Frequency of Pain Several times daily   Aggravating Factors (No Data)   Aggravating Factors Comment sudden movements   Limiting Behavior Yes   Relieving Factors Rest   Relieving Factors Comment -   Result of Injury No     PHYSICAL EXAM:   Visit Vitals  Pulse 77   Temp 97.4 °F (36.3 °C) (Temporal)   Resp 18   Ht 5' 3\" (1.6 m)   Wt 166 lb 9.6 oz (75.6 kg)   LMP 12/31/1996   SpO2 97%   BMI 29.51 kg/m²     The patient is a well-developed, well-nourished female   in no acute distress. The patient is alert and oriented times three. The patient is alert and oriented times three. Mood and affect are normal.  LYMPHATIC: lymph nodes are not enlarged and are within normal limits  SKIN: normal in color and non tender to palpation. There are no bruises or abrasions noted. NEUROLOGICAL: Motor sensory exam is within normal limits. Reflexes are equal bilaterally.  There is normal sensation to pinprick and light touch  MUSCULOSKELETAL:  Examination Right shoulder Skin Intact   AC joint tenderness -   Biceps tenderness -   Forward flexion/Elevation    Active abduction    Glenohumeral abduction 80   External rotation ROM 45   Internal rotation ROM 30   Apprehension -   Summers Relocation -   Jerk -   Load and Shift -   Obriens -   Speeds -   Impingement sign +   Supraspinatus/Empty Can +   External Rotation Strength -, 5/5   Lift Off/Belly Press -, 5/5   Neurovascular Intact     PROCEDURE: Right Shoulder Injection with Ultrasound Guidance  Indication:Right Shoulder pain/swelling    After sterile prep, 6 cc of Xylocaine and 1 cc of Kenalog were injected into the right shoulder. Intra-bursal.  Ultrasound images captured using [employer] Ultrasound machine with a frequency of 10 MHz and linear probe and scanned into patient's chart.        [facility]  OFFICE PROCEDURE PROGRESS NOTE        Chart reviewed for the following:  Lorin Arthur M.D, have reviewed the History, Physical and updated the Allergic reactions for Anahi Tang 62 performed immediately prior to start of procedure:  Lorin Arthur M.D, have performed the following reviews on [address] [place] [address] prior to the start of the procedure:            * Patient was identified by name and date of birth   * Agreement on procedure being performed was verified  * Risks and Benefits explained to the patient  * Procedure site verified and marked as necessary  * Patient was positioned for comfort  * Needle placement confirmed by ultrasound  * Consent was signed and verified     Time: 11:27 AM     Date of procedure: 7/13/2021    Procedure performed by:  Gavin Marquez M.D    Provider assisted by: (see medication administration)    How tolerated by patient: tolerated the procedure well with no complications    Comments: none    IMAGING: MRI of right shoulder from [facility] dated 7/11/2021 was reviewed and read by Dr. Collette Mirza: IMPRESSION:  1. Deep partial thickness tear at the supraspinatus tendon attachment  2. Subacromial/subdeltoid bursitis      X-rays obtained at [facility] show no acute abnormalities      IMPRESSION:      ICD-10-CM ICD-9-CM    1. Complete tear of right rotator cuff, unspecified whether traumatic  M75.121 727.61 triamcinolone acetonide (KENALOG-40) 40 mg/mL injection 40 mg      ARTHROCENTESIS ASPIR&/INJ MAJOR JT/BURSA W/US        PLAN:   1. Pt presents today with persistent right shoulder pain due to an MRI-documented full thickness tear of the rotator cuff. Surgery was discussed with the patient today but she would like to think about it. She would like to try another cortisone injection. Risk factors include: htn  2. No ultrasound exam indicated today  3. Yes cortisone injection indicated today R SHOULDER US  4. No Physical/Occupational Therapy indicated today  5. No diagnostic test indicated today  6. No durable medical equipment indicated today  7. No referral indicated today   8. No medications indicated today:   9. No Narcotic indicated today       RTC prn      Scribed by Tiffanie Baer) as dictated by Izaiah Reed MD    I, Dr. Izaiah Reed, confirm that all documentation is accurate.     Izaiah Reed M.D.   [facility]

## 2021-07-13 NOTE — LETTER
NOTIFICATION RETURN TO WORK / SCHOOL    7/13/2021 11:28 AM    Ms. Freya Rivas  [address] [facility] [address]      To Whom It May Concern:    Freya Rivas is currently under the care of [address] [place] [address] Eleazar Sood. She will have the following restrictions while at work: No overhead work and no lifting above 5 pounds    If there are questions or concerns please have the patient contact our office.         Sincerely,      Lucie Nicole MD

## 2021-07-14 DIAGNOSIS — M75.101 TEAR OF RIGHT ROTATOR CUFF, UNSPECIFIED TEAR EXTENT, UNSPECIFIED WHETHER TRAUMATIC: ICD-10-CM

## 2021-07-16 ENCOUNTER — TELEPHONE (OUTPATIENT)
Dept: ORTHOPEDIC SURGERY | Age: 61
End: 2021-07-16

## 2021-07-16 NOTE — TELEPHONE ENCOUNTER
Patient called and said she has not been able to print up the note from 7/13/21, from [address] [place] [address] on My Chart. Patient is asking if the note can be faxed to her job . Fax # [phone] Att : Dolores    Patient said if by Chance if we are unable to fax it , she will have her   the note on Monday 7/19/21: but she is hoping we can fax the note for her. Patient tel. [phone].

## 2021-08-13 DIAGNOSIS — I10 ESSENTIAL HYPERTENSION: ICD-10-CM

## 2021-08-13 NOTE — TELEPHONE ENCOUNTER
Pt needs rx refill. Requested Prescriptions     Pending Prescriptions Disp Refills    amLODIPine (NORVASC) 10 mg tablet 90 Tablet 1     Sig: Take 1 Tablet by mouth daily.     esomeprazole (NEXIUM) 40 mg capsule 90 Capsule 3     Please advise

## 2021-08-17 RX ORDER — ESOMEPRAZOLE MAGNESIUM 40 MG/1
CAPSULE, DELAYED RELEASE ORAL
Qty: 90 CAPSULE | Refills: 3 | Status: SHIPPED | OUTPATIENT
Start: 2021-08-17 | End: 2022-02-28 | Stop reason: SDUPTHER

## 2021-08-17 RX ORDER — AMLODIPINE BESYLATE 10 MG/1
10 TABLET ORAL DAILY
Qty: 90 TABLET | Refills: 1 | Status: SHIPPED | OUTPATIENT
Start: 2021-08-17 | End: 2022-01-11 | Stop reason: SDUPTHER

## 2021-08-26 ENCOUNTER — OFFICE VISIT (OUTPATIENT)
Dept: ORTHOPEDIC SURGERY | Age: 61
End: 2021-08-26
Payer: COMMERCIAL

## 2021-08-26 VITALS
OXYGEN SATURATION: 96 % | TEMPERATURE: 97.7 F | BODY MASS INDEX: 29.59 KG/M2 | WEIGHT: 167 LBS | HEART RATE: 80 BPM | HEIGHT: 63 IN

## 2021-08-26 DIAGNOSIS — M75.121 COMPLETE TEAR OF RIGHT ROTATOR CUFF, UNSPECIFIED WHETHER TRAUMATIC: Primary | ICD-10-CM

## 2021-08-26 PROCEDURE — 99214 OFFICE O/P EST MOD 30 MIN: CPT | Performed by: ORTHOPAEDIC SURGERY

## 2021-08-26 NOTE — PROGRESS NOTES
James Ness  1960   Chief Complaint   Patient presents with    Shoulder Pain     left shoulder        HISTORY OF PRESENT ILLNESS  James Ness is a 62 y.o. female who presents today for evaluation the right shoulder. Patient rates pain as 2/10 today. Pain has been present for awhile. At last OV on 7/13/2021, patient had a right shoulder cortisone injection which provided good relief but the pain returned. She would like to discussed surgery today. Pt works with a provider. Patient denies any fever, chills, chest pain, shortness of breath or calf pain. The remainder of the review of systems is negative. There are no new illness or injuries to report since last seen in the office. There are no changes to medications, allergies, family or social history. Pain Assessment  8/26/2021   Location of Pain Shoulder   Location Modifiers Left   Severity of Pain 2   Quality of Pain Dull   Duration of Pain -   Frequency of Pain -   Aggravating Factors Bending;Stretching;Straightening   Aggravating Factors Comment -   Limiting Behavior Yes   Relieving Factors Rest   Relieving Factors Comment -   Result of Injury -     PHYSICAL EXAM:   Visit Vitals  Pulse 80   Temp 97.7 °F (36.5 °C) (Temporal)   Ht 5' 3\" (1.6 m)   Wt 167 lb (75.8 kg)   LMP 12/31/1996   SpO2 96%   BMI 29.58 kg/m²     The patient is a well-developed, well-nourished female   in no acute distress. The patient is alert and oriented times three. The patient is alert and oriented times three. Mood and affect are normal.  LYMPHATIC: lymph nodes are not enlarged and are within normal limits  SKIN: normal in color and non tender to palpation. There are no bruises or abrasions noted. NEUROLOGICAL: Motor sensory exam is within normal limits. Reflexes are equal bilaterally.  There is normal sensation to pinprick and light touch  MUSCULOSKELETAL:  Examination Right shoulder   Skin Intact   AC joint tenderness -   Biceps tenderness -   Forward flexion/Elevation    Active abduction    Glenohumeral abduction 80   External rotation ROM 45   Internal rotation ROM 30   Apprehension -   Summers Relocation -   Jerk -   Load and Shift -   Obriens -   Speeds -   Impingement sign +   Supraspinatus/Empty Can +   External Rotation Strength -, 5/5   Lift Off/Belly Press -, 5/5   Neurovascular Intact       IMAGING: MRI of right shoulder from [facility] dated 7/11/2021 was reviewed and read by Dr. Estefani Soni:   IMPRESSION:  1. Deep partial thickness tear at the supraspinatus tendon attachment  2. Subacromial/subdeltoid bursitis      X-rays obtained at [facility] show no acute abnormalities      IMPRESSION:      ICD-10-CM ICD-9-CM    1. Complete tear of right rotator cuff, unspecified whether traumatic  M75.121 727.61         PLAN:   1. I discussed the risks and benefits and potential adverse outcomes of both operative vs non operative treatment of right rotator cuff tear with the patient and patient wishes to proceed with a arthroscopic right rotator cuff repair. Risks of operative intervention include but not limited to bleeding, infection, deep vein thrombosis, pulmonary embolism, death, limb length discrepancy, reflexive sympathetic dystrophy, fat embolism syndrome,damage to blood vessels and nerves, malunion, non-union, delayed union, failure of hardware, post traumatic arthritis, stroke, heart attack, and death. Patient understands that infection may arise and may require numerous surgeries. The patient was counseled at length about the risks of zac Covid-19 during their perioperative period and any recovery window from their procedure. The patient was made aware that zac Covid-19  may worsen their prognosis for recovering from their procedure and lend to a higher morbidity and/or mortality risk. All material risks, benefits, and reasonable alternatives including postponing the procedure were discussed.  The patient does wish to proceed with the procedure at this time. History and physical exam to be preformed at a later date. Risk factors include: htn  2. No ultrasound exam indicated today  3. No cortisone injection indicated today   4. No Physical/Occupational Therapy indicated today  5. No diagnostic test indicated today  6. No durable medical equipment indicated today  7. No referral indicated today   8. No medications indicated today:   9. No Narcotic indicated today       RTC H&P      Scribed by Kalani Mcdermott [facility]) as dictated by John Cloud MD    I, Dr. John Cloud, confirm that all documentation is accurate.     John Cloud M.D.   [facility]

## 2021-09-01 DIAGNOSIS — Z01.818 PREOP EXAMINATION: Primary | ICD-10-CM

## 2021-09-10 ENCOUNTER — VIRTUAL VISIT (OUTPATIENT)
Dept: FAMILY MEDICINE CLINIC | Age: 61
End: 2021-09-10
Payer: COMMERCIAL

## 2021-09-10 DIAGNOSIS — I10 ESSENTIAL HYPERTENSION: Primary | ICD-10-CM

## 2021-09-10 DIAGNOSIS — N95.1 HOT FLASHES DUE TO MENOPAUSE: ICD-10-CM

## 2021-09-10 DIAGNOSIS — J30.9 ALLERGIC RHINITIS, UNSPECIFIED SEASONALITY, UNSPECIFIED TRIGGER: ICD-10-CM

## 2021-09-10 DIAGNOSIS — F43.21 GRIEF: ICD-10-CM

## 2021-09-10 DIAGNOSIS — Z98.84 S/P GASTRIC BYPASS: ICD-10-CM

## 2021-09-10 DIAGNOSIS — E78.5 HYPERLIPIDEMIA, UNSPECIFIED HYPERLIPIDEMIA TYPE: ICD-10-CM

## 2021-09-10 DIAGNOSIS — G47.00 INSOMNIA, UNSPECIFIED TYPE: ICD-10-CM

## 2021-09-10 DIAGNOSIS — F41.9 ANXIETY: ICD-10-CM

## 2021-09-10 PROCEDURE — 99214 OFFICE O/P EST MOD 30 MIN: CPT | Performed by: FAMILY MEDICINE

## 2021-09-10 RX ORDER — CETIRIZINE HCL 10 MG
10 TABLET ORAL DAILY
Qty: 90 TABLET | Refills: 1 | Status: SHIPPED | OUTPATIENT
Start: 2021-09-10 | End: 2022-05-05 | Stop reason: SDUPTHER

## 2021-09-10 RX ORDER — VENLAFAXINE HYDROCHLORIDE 75 MG/1
75 CAPSULE, EXTENDED RELEASE ORAL DAILY
Qty: 90 CAPSULE | Refills: 1 | Status: SHIPPED | OUTPATIENT
Start: 2021-09-10 | End: 2022-02-16 | Stop reason: SDUPTHER

## 2021-09-10 RX ORDER — HYDROXYZINE HYDROCHLORIDE 10 MG/1
TABLET, FILM COATED ORAL
COMMUNITY
Start: 2021-07-21 | End: 2021-09-10 | Stop reason: SDUPTHER

## 2021-09-10 RX ORDER — HYDROXYZINE HYDROCHLORIDE 10 MG/1
TABLET, FILM COATED ORAL
Qty: 60 TABLET | Refills: 5 | Status: SHIPPED | OUTPATIENT
Start: 2021-09-10

## 2021-09-10 NOTE — PROGRESS NOTES
Shlomo Rinne presents today for   Chief Complaint   Patient presents with    Hypertension    Insomnia    Anxiety    GERD    Cholesterol Problem    Bereavement Counseling     Follow up         Virtual/telephone visit    Depression Screening:  3 most recent PHQ Screens 8/26/2021   Little interest or pleasure in doing things Not at all   Feeling down, depressed, irritable, or hopeless Not at all   Total Score PHQ 2 0       Learning Assessment:  Learning Assessment 2/19/2021   PRIMARY LEARNER Patient   HIGHEST LEVEL OF EDUCATION - PRIMARY LEARNER  SOME COLLEGE   BARRIERS PRIMARY LEARNER NONE   CO-LEARNER CAREGIVER No   PRIMARY LANGUAGE ENGLISH   LEARNER PREFERENCE PRIMARY READING     -     -     -     -   ANSWERED BY Patient   RELATIONSHIP SELF       Travel Screening:   Travel Screening      No screening recorded since 09/09/21 0000      Travel History   Travel since 08/10/21     No documented travel since 08/10/21          Health Maintenance reviewed and discussed and ordered per Provider. Health Maintenance Due   Topic Date Due    DTaP/Tdap/Td series (1 - Tdap) Never done    Shingrix Vaccine Age 50> (1 of 2) Never done    Flu Vaccine (1) 09/01/2021   . Coordination of Care:  1. Have you been to the ER, urgent care clinic since your last visit? Hospitalized since your last visit? No    2. Have you seen or consulted any other health care providers outside of the [address] [place] [address] since your last visit? Include any pap smears or colon screening.  No

## 2021-09-10 NOTE — PROGRESS NOTES
Hira Olvera is a 62 y.o. female who was seen by synchronous (real-time) audio-video technology on 9/10/2021 for Hypertension, Insomnia, Anxiety, GERD, Cholesterol Problem, Bereavement Counseling (Follow up  ), and Other (Pt states that she still has stuffy nose from last visit. )        Assessment & Plan:   Diagnoses and all orders for this visit:    1. Essential hypertension  -     METABOLIC PANEL, COMPREHENSIVE; Future  -     LIPID PANEL; Future    2. Insomnia, unspecified type  Stable, cont pres tx plan. 3. Hyperlipidemia, unspecified hyperlipidemia type  -     METABOLIC PANEL, COMPREHENSIVE; Future  -     LIPID PANEL; Future    4. Grief  Contact information given for therapist.    5. Hot flashes due to menopause  -     venlafaxine-SR (EFFEXOR-XR) 75 mg capsule; Take 1 Capsule by mouth daily. 6. S/P gastric bypass  -     METABOLIC PANEL, COMPREHENSIVE; Future  -     LIPID PANEL; Future  -     IRON PROFILE; Future  -     VITAMIN D, 25 HYDROXY; Future  -     VITAMIN B12 & FOLATE; Future  -     VITAMIN B1, WHOLE BLOOD; Future    7. Allergic rhinitis, unspecified seasonality, unspecified trigger  -     cetirizine (ZYRTEC) 10 mg tablet; Take 1 Tablet by mouth daily. 8. Anxiety  -     hydrOXYzine HCL (ATARAX) 10 mg tablet; take 1 to 2 tablets by mouth three times a day if needed for anxiety for up to 10      The complexity of medical decision making for this visit is moderate   Follow-up and Dispositions    · Return in about 3 months (around 12/10/2021) for anxiety, high blood pressure, allergic rhinitis. 712  Subjective:   Patient contacted via [email or web address] me. Pt has seen ortho for her ongoing shoulder pain. They are tentatively planning surgery for Nov 1, 2021 per pt. She is looking forward to her 61th bday and her 41th anniversary in Oct prior to that date. No recent labs. Pt did not get connected with a therapist for counseling. She lost the phone number.    She is still would like to have that number again. Pt is putting her home on the market soon. She has been feeling fairly anxious with this. The hydroxyzine has been very helpful for her anxiety. Prior to Admission medications    Medication Sig Start Date End Date Taking? Authorizing Provider   venlafaxine-SR [facility] 75 mg capsule Take 1 Capsule by mouth daily. 9/10/21  Yes Marcus Zaragoza MD   cetirizine (ZYRTEC) 10 mg tablet Take 1 Tablet by mouth daily. 9/10/21  Yes Marcus Zaragoza MD   hydrOXYzine HCL (ATARAX) 10 mg tablet take 1 to 2 tablets by mouth three times a day if needed for anxiety for up to 10 9/10/21  Yes Adry Rodriguez MD   eszopiclone (LUNESTA) 3 mg tablet Take 1 Tablet by mouth nightly as needed for Sleep. Max Daily Amount: 3 mg. 9/4/21  Yes Marcus Zaragoza MD   amLODIPine (NORVASC) 10 mg tablet Take 1 Tablet by mouth daily. 8/17/21  Yes Marcus Zaragoza MD   esomeprazole (NEXIUM) 40 mg capsule TAKE 1 CAPSULE DAILY 8/17/21  Yes Marcus Zaragoza MD   conjugated estrogens (PREMARIN) 0.3 mg tablet Take 1 Tablet by mouth daily. 6/4/21  Yes Marcus Zaragoza MD   telmisartan-hydroCHLOROthiazide (MICARDIS HCT) 80-25 mg per tablet Take 1 Tab by mouth daily. 4/14/21  Yes Marcus Zaragoza MD   azelastine (ASTELIN) 137 mcg (0.1 %) nasal spray 1 [place] by Both Nostrils route two (2) times a day. Use in each nostril as directed 3/17/21  Yes Marcus Zaragoza MD   lidocaine (LIDODERM) 5 %  12/12/20  Yes Provider, Historical   acetaminophen (TYLENOL) 325 mg tablet  12/12/20  Yes Provider, Historical   Biotin 2,500 mcg cap Take  by mouth. Yes Provider, Historical   albuterol (PROVENTIL HFA, VENTOLIN HFA, PROAIR HFA) 90 mcg/actuation inhaler Take 2 Puffs by inhalation every four (4) hours as needed for Wheezing. 1/22/20  Yes Adry Rodriguez MD   PEDIATRIC MULTIVIT COMB #19/FA (CHILDREN'S MULTI-VIT GUMMIES PO) Take  by mouth two (2) times a day.    Yes Provider, Historical   calcium carbonate (CALTREX) 600 mg (1,500 mg) tablet Take 600 mg by mouth daily. Yes Provider, Historical   Vitamin B Complex-Vit B12 1,200 mcg/mL Drop by SubLINGual route daily. Yes Provider, Historical     Patient Active Problem List   Diagnosis Code    Essential hypertension I10    Cerebral thrombosis with cerebral infarction ([facility] [address] I63.30    Hyperlipidemia E78.5    AR (allergic rhinitis) J30.9    GERD (gastroesophageal reflux disease) K21.9    S/P gastric bypass Z98.84    Thrombocytosis (HCC) D47.3     Current Outpatient Medications   Medication Sig Dispense Refill    venlafaxine-SR (EFFEXOR-XR) 75 mg capsule Take 1 Capsule by mouth daily. 90 Capsule 1    cetirizine (ZYRTEC) 10 mg tablet Take 1 Tablet by mouth daily. 90 Tablet 1    hydrOXYzine HCL (ATARAX) 10 mg tablet take 1 to 2 tablets by mouth three times a day if needed for anxiety for up to 10 60 Tablet 5    eszopiclone (LUNESTA) 3 mg tablet Take 1 Tablet by mouth nightly as needed for Sleep. Max Daily Amount: 3 mg. 90 Tablet 0    amLODIPine (NORVASC) 10 mg tablet Take 1 Tablet by mouth daily. 90 Tablet 1    esomeprazole (NEXIUM) 40 mg capsule TAKE 1 CAPSULE DAILY 90 Capsule 3    conjugated estrogens (PREMARIN) 0.3 mg tablet Take 1 Tablet by mouth daily. 90 Tablet 1    telmisartan-hydroCHLOROthiazide (MICARDIS HCT) 80-25 mg per tablet Take 1 Tab by mouth daily. 90 Tab 4    azelastine (ASTELIN) 137 mcg (0.1 %) nasal spray 1 [place] by Both Nostrils route two (2) times a day. Use in each nostril as directed 3 Bottle 4    lidocaine (LIDODERM) 5 %       acetaminophen (TYLENOL) 325 mg tablet       Biotin 2,500 mcg cap Take  by mouth.  albuterol (PROVENTIL HFA, VENTOLIN HFA, PROAIR HFA) 90 mcg/actuation inhaler Take 2 Puffs by inhalation every four (4) hours as needed for Wheezing. 1 Inhaler 0    PEDIATRIC MULTIVIT COMB #19/FA (CHILDREN'S MULTI-VIT GUMMIES PO) Take  by mouth two (2) times a day.       calcium carbonate (CALTREX) 600 mg (1,500 mg) tablet Take 600 mg by mouth daily.      Vitamin B Complex-Vit B12 1,200 mcg/mL Drop by SubLINGual route daily. Allergies   Allergen Reactions    Tioconazole Itching     Past Medical History:   Diagnosis Date    AR (allergic rhinitis)     Cerebral thrombosis with cerebral infarction ([facility] [address] 9/10    Depression     GERD (gastroesophageal reflux disease) 2011    HTN (hypertension)     Hyperlipidemia     Right shoulder pain     Ruptured appendix     Uterine prolapse      Past Surgical History:   Procedure Laterality Date    HX APPENDECTOMY  13    [facility]    HX BREAST REDUCTION      HX GASTRIC BYPASS  12    HX HAMMER TOE REPAIR  1986    right 5th toe    HX TOTAL ABDOMINAL HYSTERECTOMY  2004    AR LAP,CHOLECYSTECTOMY      [facility]     Family History   Problem Relation Age of Onset    Hypertension Mother     Heart Attack Mother     Arthritis-osteo Father     Cancer Father         prostate    Hypertension Father     Stroke Father         CVA    Pneumonia Father 80        covid pna 2021    Heart Attack Maternal Grandmother         MI    Osteoporosis Maternal Grandfather         Alzheimer's Disease    Other Paternal Grandmother     Heart Attack Paternal Grandfather         MI    Other Other         1 sib complications from Gastric Bypass    Hypertension Other         1 sib    Stroke Brother      Social History     Tobacco Use    Smoking status: Former Smoker     Packs/day: 0.50     Years: 15.00     Pack years: 7.50     Types: Cigarettes     Quit date: 2002     Years since quittin.7    Smokeless tobacco: Never Used   Substance Use Topics    Alcohol use: Yes     Comment: occassional wine       Review of Systems   Constitutional: Negative. HENT: Negative. Respiratory: Negative. Cardiovascular: Negative. Psychiatric/Behavioral: The patient is nervous/anxious.     All other systems reviewed and are negative. Objective:   No flowsheet data found. General: alert, cooperative, no distress   Mental  status: normal mood, behavior, speech, dress, motor activity, and thought processes, able to follow commands   HENT: NCAT   Neck: no visualized mass   Resp: no respiratory distress   Neuro: no gross deficits   Skin: no discoloration or lesions of concern on visible areas   Psychiatric: normal affect, consistent with stated mood, no evidence of hallucinations     Additional exam findings: none      We discussed the expected course, resolution and complications of the diagnosis(es) in detail. Medication risks, benefits, costs, interactions, and alternatives were discussed as indicated. I advised her to contact the office if her condition worsens, changes or fails to improve as anticipated. She expressed understanding with the diagnosis(es) and plan. Mairya Padmini, was evaluated through a synchronous (real-time) audio-video encounter. The patient (or guardian if applicable) is aware that this is a billable service. Verbal consent to proceed has been obtained within the past 12 months. The visit was conducted pursuant to the emergency declaration under the [address] [place] [address] authority and the [employer] Act. Patient identification was verified, and a caregiver was present when appropriate. The patient was located in a state where the provider was credentialed to provide care.     Harlene Meigs, MD

## 2021-10-07 DIAGNOSIS — M75.121 COMPLETE TEAR OF RIGHT ROTATOR CUFF, UNSPECIFIED WHETHER TRAUMATIC: Primary | ICD-10-CM

## 2021-10-11 ENCOUNTER — TELEPHONE (OUTPATIENT)
Dept: ORTHOPEDIC SURGERY | Age: 61
End: 2021-10-11

## 2021-10-11 NOTE — LETTER
10/11/2021 3:53 PM    Ms. Romy Dowell  [address] [facility] [id number]         To whom it may concern:       Please excuse her from work on 10-.               Sincerely,      Rochelle Rodriguez MD

## 2021-10-11 NOTE — TELEPHONE ENCOUNTER
Patient called stating she had to be out of work today due to pain in her shoulder. She says her job knows she's having surgery on 11/01/21 but they still told her they need a doctor's note for her being out today. She's wanting me to ask if we can fax this to her job for her at fax number [phone] Attn: Melinda/Korina. Please advise patient back regarding this at [phone].

## 2021-10-11 NOTE — TELEPHONE ENCOUNTER
Discussed with Dr. Macey Esquivel. Per Dr. Macey Esquivel, it is ok to provide work note for missing work today. Letter faxed to number provided.

## 2021-10-18 ENCOUNTER — HOSPITAL ENCOUNTER (OUTPATIENT)
Dept: LAB | Age: 61
Discharge: HOME | End: 2021-10-18
Payer: COMMERCIAL

## 2021-10-18 DIAGNOSIS — Z01.818 PREOP EXAMINATION: ICD-10-CM

## 2021-10-18 LAB
ANION GAP SERPL CALC-SCNC: 2 MMOL/L (ref 3–18)
ATRIAL RATE: 85 BPM
BASOPHILS # BLD: 0 K/UL (ref 0–0.1)
BASOPHILS NFR BLD: 1 % (ref 0–2)
BUN SERPL-MCNC: 18 MG/DL (ref 7–18)
BUN/CREAT SERPL: 22 (ref 12–20)
CALCIUM SERPL-MCNC: 9.3 MG/DL (ref 8.5–10.1)
CALCULATED P AXIS, ECG09: 59 DEGREES
CALCULATED R AXIS, ECG10: 0 DEGREES
CALCULATED T AXIS, ECG11: 28 DEGREES
CHLORIDE SERPL-SCNC: 102 MMOL/L (ref 100–111)
CO2 SERPL-SCNC: 33 MMOL/L (ref 21–32)
CREAT SERPL-MCNC: 0.83 MG/DL (ref 0.6–1.3)
DIAGNOSIS, 93000: NORMAL
DIFFERENTIAL METHOD BLD: ABNORMAL
EOSINOPHIL # BLD: 0.1 K/UL (ref 0–0.4)
EOSINOPHIL NFR BLD: 1 % (ref 0–5)
ERYTHROCYTE [DISTWIDTH] IN BLOOD BY AUTOMATED COUNT: 14.1 % (ref 11.6–14.5)
GLUCOSE SERPL-MCNC: 99 MG/DL (ref 74–99)
HCT VFR BLD AUTO: 36.2 % (ref 35–45)
HGB BLD-MCNC: 11.8 G/DL (ref 12–16)
LYMPHOCYTES # BLD: 1.8 K/UL (ref 0.9–3.6)
LYMPHOCYTES NFR BLD: 34 % (ref 21–52)
MCH RBC QN AUTO: 29.9 PG (ref 24–34)
MCHC RBC AUTO-ENTMCNC: 32.6 G/DL (ref 31–37)
MCV RBC AUTO: 91.6 FL (ref 78–100)
MONOCYTES # BLD: 0.5 K/UL (ref 0.05–1.2)
MONOCYTES NFR BLD: 10 % (ref 3–10)
NEUTS SEG # BLD: 2.8 K/UL (ref 1.8–8)
NEUTS SEG NFR BLD: 54 % (ref 40–73)
P-R INTERVAL, ECG05: 166 MS
PLATELET # BLD AUTO: 285 K/UL (ref 135–420)
PMV BLD AUTO: 11.7 FL (ref 9.2–11.8)
POTASSIUM SERPL-SCNC: 3.4 MMOL/L (ref 3.5–5.5)
Q-T INTERVAL, ECG07: 366 MS
QRS DURATION, ECG06: 76 MS
QTC CALCULATION (BEZET), ECG08: 435 MS
RBC # BLD AUTO: 3.95 M/UL (ref 4.2–5.3)
SODIUM SERPL-SCNC: 137 MMOL/L (ref 136–145)
VENTRICULAR RATE, ECG03: 85 BPM
WBC # BLD AUTO: 5.1 K/UL (ref 4.6–13.2)

## 2021-10-18 PROCEDURE — 36415 COLL VENOUS BLD VENIPUNCTURE: CPT

## 2021-10-18 PROCEDURE — 93005 ELECTROCARDIOGRAM TRACING: CPT

## 2021-10-18 PROCEDURE — 80048 BASIC METABOLIC PNL TOTAL CA: CPT

## 2021-10-18 PROCEDURE — 85025 COMPLETE CBC W/AUTO DIFF WBC: CPT

## 2021-10-19 NOTE — H&P
HISTORY AND PHYSICAL        Patient: Sunil Le                   MRN: [id number]         SSN: [id number]  YOB: 1960                        AGE: 62 y.o. SEX: female      Patient scheduled for:  RIGHT SHOULDER ARTHROSCOPIC ROTATOR CUFF REPAIR   Date of surgery: 11/1/21   Location of Surgery: [address] [place] [address] at [facility]  Surgeon: Lakia Pennington MD  ANESTHESIA TYPE:  General          ORDERS PLACED DURING H&P:    Orders Placed This Encounter    DISCONTD: oxyCODONE-acetaminophen (Percocet) 7.5-325 mg per tablet     Sig: Take 1-2 Tablets by mouth every eight (8) hours as needed for Pain (post operative) for up to 7 days. Max Daily Amount: 6 Tablets. Indications: pain     Dispense:  42 Tablet     Refill:  0    DISCONTD: polyethylene glycol (Miralax) 17 gram/dose powder     Sig: Take 17 g by mouth daily. Dispense:  255 g     Refill:  1    DISCONTD: ondansetron (ZOFRAN ODT) 4 mg disintegrating tablet     Sig: Take 1 Tablet by mouth every eight (8) hours as needed for Nausea or Vomiting. Indications: prevent nausea and vomiting after surgery     Dispense:  30 Tablet     Refill:  1    oxyCODONE-acetaminophen (Percocet) 7.5-325 mg per tablet     Sig: Take 1-2 Tablets by mouth every eight (8) hours as needed for Pain (post operative) for up to 7 days. Max Daily Amount: 6 Tablets. Indications: pain     Dispense:  42 Tablet     Refill:  0    polyethylene glycol (Miralax) 17 gram/dose powder     Sig: Take 17 g by mouth daily. Dispense:  255 g     Refill:  1    ondansetron (ZOFRAN ODT) 4 mg disintegrating tablet     Sig: Take 1 Tablet by mouth every eight (8) hours as needed for Nausea or Vomiting.  Indications: prevent nausea and vomiting after surgery     Dispense:  30 Tablet     Refill:  1        Post Operative Prescriptions have  been e-scribed during the H&P to [employer] on [address] [facility]         HISTORY:     The patient was seen in the office today for a preoperative history and physical for an upcoming above listed surgery. The patient is a pleasant 62 y.o. female who has a history of  right shoulder pain. Patient had an MRI of the right shoulder completed at [facility] on 7/11/2021 was reviewed and read by Dr. Caron Jane which revealed:     1. Deep partial thickness tear at the supraspinatus tendon attachment  2. Subacromial/subdeltoid bursitis    Due to the current findings, affected activity of daily living and continued pain and discomfort, surgical intervention is indicated. The alternatives, risks, and complications, including but not limited to infection, blood loss, need for blood transfusion, neurovascular damage, tonia-incisional numbness, subcutaneous hematoma, bone fracture, anesthetic complications, DVT, PE, death, RSD, postoperative stiffness and pain, possible surgical scar, delayed healing and nonhealing, reflexive sympathetic dystrophy, damage to blood vessels and nerves, need for more surgery, MI, and stroke, failure of hardware, gait disturbances  have been discussed. The patient understands and wishes to proceed with surgery. Patient states that she has had multiple TIA's in the past and is followed by a neurologist annually. PAST MEDICAL HISTORY:     Past Medical History:   Diagnosis Date    AR (allergic rhinitis)     Cerebral thrombosis with cerebral infarction ([facility] [address] 9/10    Depression     GERD (gastroesophageal reflux disease) 7/5/2011    HTN (hypertension)     Hyperlipidemia     Rhinitis     Right shoulder pain     Ruptured appendix     Uterine prolapse        CURRENT MEDICATIONS:     Current Outpatient Medications   Medication Sig Dispense Refill    oxyCODONE-acetaminophen (Percocet) 7.5-325 mg per tablet Take 1-2 Tablets by mouth every eight (8) hours as needed for Pain (post operative) for up to 7 days. Max Daily Amount: 6 Tablets.  Indications: pain 42 Tablet 0    polyethylene glycol (Miralax) 17 gram/dose powder Take 17 g by mouth daily. 255 g 1    ondansetron (ZOFRAN ODT) 4 mg disintegrating tablet Take 1 Tablet by mouth every eight (8) hours as needed for Nausea or Vomiting. Indications: prevent nausea and vomiting after surgery 30 Tablet 1    venlafaxine-SR (EFFEXOR-XR) 75 mg capsule Take 1 Capsule by mouth daily. 90 Capsule 1    cetirizine (ZYRTEC) 10 mg tablet Take 1 Tablet by mouth daily. 90 Tablet 1    hydrOXYzine HCL (ATARAX) 10 mg tablet take 1 to 2 tablets by mouth three times a day if needed for anxiety for up to 10 60 Tablet 5    eszopiclone (LUNESTA) 3 mg tablet Take 1 Tablet by mouth nightly as needed for Sleep. Max Daily Amount: 3 mg. 90 Tablet 0    amLODIPine (NORVASC) 10 mg tablet Take 1 Tablet by mouth daily. 90 Tablet 1    esomeprazole (NEXIUM) 40 mg capsule TAKE 1 CAPSULE DAILY 90 Capsule 3    conjugated estrogens (PREMARIN) 0.3 mg tablet Take 1 Tablet by mouth daily. 90 Tablet 1    telmisartan-hydroCHLOROthiazide (MICARDIS HCT) 80-25 mg per tablet Take 1 Tab by mouth daily. 90 Tab 4    azelastine (ASTELIN) 137 mcg (0.1 %) nasal spray 1 [place] by Both Nostrils route two (2) times a day. Use in each nostril as directed 3 Bottle 4    lidocaine (LIDODERM) 5 %       acetaminophen (TYLENOL) 325 mg tablet       Biotin 2,500 mcg cap Take  by mouth.  albuterol (PROVENTIL HFA, VENTOLIN HFA, PROAIR HFA) 90 mcg/actuation inhaler Take 2 Puffs by inhalation every four (4) hours as needed for Wheezing. 1 Inhaler 0    PEDIATRIC MULTIVIT COMB #19/FA (CHILDREN'S MULTI-VIT GUMMIES PO) Take  by mouth two (2) times a day.  calcium carbonate (CALTREX) 600 mg (1,500 mg) tablet Take 600 mg by mouth daily.  Vitamin B Complex-Vit B12 1,200 mcg/mL Drop by SubLINGual route daily.            ALLERGIES:     Allergies   Allergen Reactions    Tioconazole Itching         SURGICAL HISTORY:     Past Surgical History:   Procedure Laterality Date    [facility]    HX BREAST REDUCTION     HX GASTRIC BYPASS  12    HX HAMMER TOE REPAIR  1986    right 5th toe    HX TOTAL ABDOMINAL HYSTERECTOMY  2004    [place] LAP,CHOLECYSTECTOMY  2002    [place] REVAGINAL PROLAPSE,UTEROSACRAL  2018    [facility]       SOCIAL HISTORY:     Social History     Socioeconomic History    Marital status:      Spouse name: Not on file    Number of children: Not on file    Years of education: Not on file    Highest education level: Not on file   Occupational History    Occupation: MA   Tobacco Use    Smoking status: Former Smoker     Packs/day: 0.50     Years: 15.00     Pack years: 7.50     Types: Cigarettes     Quit date: 2002     Years since quittin.8    Smokeless tobacco: Never Used   Substance and Sexual Activity    Alcohol use: Yes     Comment: occassional wine    Drug use: Yes     Types: Prescription, OTC     Comment: prescribed and over the counter medication     Social Determinants of Health     Financial Resource Strain:     Difficulty of Paying Living Expenses:    Food Insecurity:     Worried About Running Out of Food in the Last Year:     [address] in the Last Year:    Transportation Needs:     Lack of Transportation (Medical):      Lack of Transportation (Non-Medical):    Physical Activity:     Days of Exercise per Week:     Minutes of Exercise per Session:    Stress:     Feeling of Stress :    Social Connections:     Frequency of Communication with Friends and Family:     Frequency of Social Gatherings with Friends and Family:     Attends [employer] Services:     Active Member of Clubs or Organizations:     Attends Club or Organization Meetings:     Marital Status:        FAMILY HISTORY:     Family History   Problem Relation Age of Onset    Hypertension Mother     Heart Attack Mother     Other Mother     Arthritis-osteo Father    Ed Garfield Father         prostate    Hypertension Father     Stroke Father         CVA    Pneumonia Father 80 covid pna 2/2021    Other Father     Heart Attack Maternal Grandmother         MI    Osteoporosis Maternal Grandfather         Alzheimer's Disease    Other Paternal Grandmother     Heart Attack Paternal Grandfather         MI    Other Other         1 sib complications from Gastric Bypass    Hypertension Other         1 sib    Stroke Brother     Other Maternal Aunt        REVIEW OF SYSTEMS:     Negative for fevers, chills, chest pain, shortness of breath, weight loss, recent illness     General: Negative for fever and chills. No unexpected change in weight. Denies fatigue. No change in appetite. Skin: Negative for rash or itching. HEENT: Negative for congestion, sore throat, neck pain and neck stiffness. No change in vision or hearing. Hasn't noted any enlarged lymph nodes in the neck. Cardiovascular:  Negative for chest pain and palpitations. Has not noted pedal edema. Respiratory: Negative for cough, colds, sinus, hemoptysis, shortness of breath and wheezing. Gastrointestinal: Negative for nausea and vomiting, rectal bleeding, coffee ground emesis, abdominal pain, diarrhea and constipation. Genitourinary: Negative for dysuria, frequency urgency, or burning on micturition. No flank pain, no foul smelling urine, no difficulty with initiating urination. Hematological: Negative for bleeding or easy bruising. Musculoskeletal: Negative for arthralgias, back pain or neck pain. Neurological: Negative for dizziness, seizures or syncopal episodes. Denies headaches. Endocrine: Denies excessive thirst.  No heat/cold intolerance. Psychiatric: Negative for depression or insomnia.     PHYSICAL EXAMINATION:     VITALS:   Visit Vitals  BP (!) 140/88   Temp 97.3 °F (36.3 °C)   LMP 12/31/1996       Pain Assessment  10/20/2021   Location of Pain Shoulder   Location Modifiers Right   Severity of Pain 7   Quality of Pain Dull;Aching   Duration of Pain -   Frequency of Pain Constant   Aggravating Factors Other (Comment)   Aggravating Factors Comment -   Limiting Behavior Yes   Relieving Factors Rest   Relieving Factors Comment -   Result of Injury -        GEN:  Well developed, well nourished 62 y.o. female in no acute distress. PSYCH: Alert an oriented to person, place and time. Mood, memory, affect, behavior and judgment normal. Speech normal in context and clarity. HEENT: Normocephalic and atraumatic. Eyes: Conjunctivae and EOM are normal.Pupils are equal, round, and reactive to light. External ear normal appearance, external nose normal appearing. Mouth/Throat: Oropharynx is clear and moist, able to handle oral secretions w/out difficulty, airway patent. NECK: Supple. Normal ROM, No lymphadenopathy. Trachea is midline. No bruising, swelling or deformity  RESP: Clear to auscultation bilaterally. No audible wheezing from mouth. No rales, rhonchi. Normal effort and breath sounds. No respiratory use of accessory muscles during breathing or distress  CHEST/ABDOMEN: Observation reveals: No audible wheezing from mouth. No accessory use of chest muscles during breathing. Non tender abdomen  CARDIO:  Normal rate, regular rhythm and normal heart sounds. No MGR. ABDOMEN: Non-tender, non-distended, normoactive bowel sounds in all four quadrants. There is no tenderness. There is no rebound and no guarding. BACK: No CVA or spinal tenderness  BREAST:  Deferred  PELVIC:    Deferred   RECTAL:  Deferred   :           Deferred  EXTREMITIES: EXAMINATION OF: RIGHT SHOULDER    LYMPHATIC: lymph nodes are not enlarged and are within normal limits  SKIN: normal in color and non tender to palpation. There are no bruises or abrasions noted. NEUROLOGICAL: Motor sensory exam is within normal limits. Reflexes are equal bilaterally.  There is normal sensation to pinprick and light touch  MUSCULOSKELETAL:  Examination Right shoulder   Skin Intact   AC joint tenderness -   Biceps tenderness -   Forward flexion/Elevation  Active abduction    Glenohumeral abduction 80   External rotation ROM 45   Internal rotation ROM 30   Apprehension -   Summers Relocation -   Jerk -   Load and Shift -   Obriens -   Speeds -   Impingement sign +   Supraspinatus/Empty Can +   External Rotation Strength -, 5/5   Lift Off/Belly Press -, 5/5   Neurovascular Intact         RADIOGRAPHS & DIAGNOSTIC STUDIES:       IMAGING: MRI of right shoulder [facility] dated 7/11/2021 was reviewed and read by Dr. Joaquin Arroyo:   IMPRESSION:  1. Deep partial thickness tear at the supraspinatus tendon attachment  2. Subacromial/subdeltoid bursitis      XR Results (most recent):  Results from Orders Only encounter on 10/01/20    XR SHOULDER RT AP/LAT MIN 2 V    Narrative  RIGHT SHOULDER, 2 views    CPT CODE: 65456    INDICATION: Above. Right shoulder pain, no known traumatic injury. COMPARISON: No prior right shoulder radiographs. Reference chest radiographs  3/7/2020. TECHNIQUE:  Two AP views of the right shoulder with the humerus held in internal  and external rotation are reviewed. FINDINGS:    There is no evidence of acute fracture or dislocation. The visualized joint  spaces appear grossly maintained. No definite radiographic evidence of  significant localized soft tissue swelling is seen. Streaky opacities are noted overlying the soft tissues of the lower  neck/shoulder region possibly related to the patient's hair. Metallic  buckle-like opacity projects over the right axillary region likely related to  clothing. Impression  Impression:    No evidence of acute fracture or dislocation.             LABS:       Hospital Outpatient Visit on 10/18/2021   Component Date Value Ref Range Status    Ventricular Rate 10/18/2021 85  BPM Final    Atrial Rate 10/18/2021 85  BPM Final    P-R Interval 10/18/2021 166  ms Final    QRS Duration 10/18/2021 76  ms Final    Q-T Interval 10/18/2021 366  ms Final    QTC Calculation (Bezet) 10/18/2021 435  ms Final  Calculated P Axis 10/18/2021 59  degrees Final    Calculated R Axis 10/18/2021 0  degrees Final    Calculated T Axis 10/18/2021 28  degrees Final    Diagnosis 10/18/2021    Final                    Value:Normal sinus rhythm  No previous ECGs available  Confirmed by Morgan Lubin ([phone]) on 10/18/2021 5:41:03 PM     Hospital Outpatient Visit on 10/18/2021   Component Date Value Ref Range Status    Sodium 10/18/2021 137  136 - 145 mmol/L Final    Potassium 10/18/2021 3.4* 3.5 - 5.5 mmol/L Final    Chloride 10/18/2021 102  100 - 111 mmol/L Final    CO2 10/18/2021 33* 21 - 32 mmol/L Final    Anion gap 10/18/2021 2* 3.0 - 18 mmol/L Final    Glucose 10/18/2021 99  74 - 99 mg/dL Final    BUN 10/18/2021 18  7.0 - 18 MG/DL Final    Creatinine 10/18/2021 0.83  0.6 - 1.3 MG/DL Final    BUN/Creatinine ratio 10/18/2021 22* 12 - 20   Final    GFR est AA 10/18/2021 >60  >60 ml/min/1.73m2 Final    GFR est non-AA 10/18/2021 >60  >60 ml/min/1.73m2 Final    Comment: (NOTE)  Estimated GFR is calculated using the Modification of Diet in Renal   Disease (MDRD) Study equation, reported for both  Americans   (GFRAA) and non- Americans (GFRNA), and normalized to 1.73m2   body surface area. The physician must decide which value applies to   the patient. The MDRD study equation should only be used in   individuals age 25 or older. It has not been validated for the   following: pregnant women, patients with serious comorbid conditions,   or on certain medications, or persons with extremes of body size,   muscle mass, or nutritional status.       Calcium 10/18/2021 9.3  8.5 - 10.1 MG/DL Final    WBC 10/18/2021 5.1  4.6 - 13.2 K/uL Final    RBC 10/18/2021 3.95* 4.20 - 5.30 M/uL Final    HGB 10/18/2021 11.8* 12.0 - 16.0 g/dL Final    HCT 10/18/2021 36.2  35.0 - 45.0 % Final    MCV 10/18/2021 91.6  78.0 - 100.0 FL Final    MCH 10/18/2021 29.9  24.0 - 34.0 PG Final    MCHC 10/18/2021 32.6  31.0 - 37.0 g/dL Final    RDW 10/18/2021 14.1  11.6 - 14.5 % Final    PLATELET 67/71/3362 916  135 - 420 K/uL Final    MPV 10/18/2021 11.7  9.2 - 11.8 FL Final    NEUTROPHILS 10/18/2021 54  40 - 73 % Final    LYMPHOCYTES 10/18/2021 34  21 - 52 % Final    MONOCYTES 10/18/2021 10  3 - 10 % Final    EOSINOPHILS 10/18/2021 1  0 - 5 % Final    BASOPHILS 10/18/2021 1  0 - 2 % Final    ABS. NEUTROPHILS 10/18/2021 2.8  1.8 - 8.0 K/UL Final    ABS. LYMPHOCYTES 10/18/2021 1.8  0.9 - 3.6 K/UL Final    ABS. MONOCYTES 10/18/2021 0.5  0.05 - 1.2 K/UL Final    ABS. EOSINOPHILS 10/18/2021 0.1  0.0 - 0.4 K/UL Final    ABS. BASOPHILS 10/18/2021 0.0  0.0 - 0.1 K/UL Final    DF 10/18/2021 AUTOMATED    Final          EKG RESULTS     Procedure Component Value Units Date/Time    EKG, 12 LEAD, INITIAL [[id number]] Collected: 10/18/21 0911    Order Status: Completed Updated: 10/18/21 1741     Ventricular Rate 85 BPM      Atrial Rate 85 BPM      P-R Interval 166 ms      QRS Duration 76 ms      Q-T Interval 366 ms      QTC Calculation (Bezet) 435 ms      Calculated P Axis 59 degrees      Calculated R Axis 0 degrees      Calculated T Axis 28 degrees      Diagnosis --     Normal sinus rhythm  No previous ECGs available  Confirmed by Sammy Mercer ([id number]) on 10/18/2021 5:41:03 PM                ASSESSMENT:     Encounter Diagnoses     ICD-10-CM ICD-9-CM   1. Complete tear of right rotator cuff, unspecified whether traumatic  M75.121 727.61   2. Pre-operative examination  Z01.818 V72.84        PLAN:     Again, the alternatives, risks, and complications, as well as expected outcome were discussed. The patient understands and agrees to proceed with the above listed surgery pending contact with patients neurologist as patient states she has had multiple TIA's in the past and is followed annually by a neurologist.       Follow-up and Dispositions    · Return in about 19 days (around 11/8/2021) for post surgical evaluation.            The patient was counseled at length about the risks of zac Covid-19 during their perioperative period and any recovery window from their procedure. The patient was made aware that zac Covid-19  may worsen their prognosis for recovering from their procedure and lend to a higher morbidity and/or mortality risk. All material risks, benefits, and reasonable alternatives including postponing the procedure were discussed. The patient does wish to proceed with the procedure at this time. [facility] located within [facility], MPA, PA-C  10/20/2021

## 2021-10-20 ENCOUNTER — OFFICE VISIT (OUTPATIENT)
Dept: ORTHOPEDIC SURGERY | Age: 61
End: 2021-10-20

## 2021-10-20 VITALS — DIASTOLIC BLOOD PRESSURE: 88 MMHG | SYSTOLIC BLOOD PRESSURE: 140 MMHG | TEMPERATURE: 97.3 F

## 2021-10-20 DIAGNOSIS — M75.121 COMPLETE TEAR OF RIGHT ROTATOR CUFF, UNSPECIFIED WHETHER TRAUMATIC: Primary | ICD-10-CM

## 2021-10-20 DIAGNOSIS — Z01.818 PRE-OPERATIVE EXAMINATION: ICD-10-CM

## 2021-10-20 RX ORDER — POLYETHYLENE GLYCOL 3350 17 G/17G
17 POWDER, FOR SOLUTION ORAL DAILY
Qty: 255 G | Refills: 1 | Status: SHIPPED | OUTPATIENT
Start: 2021-10-20 | End: 2022-04-18

## 2021-10-20 RX ORDER — OXYCODONE AND ACETAMINOPHEN 7.5; 325 MG/1; MG/1
1-2 TABLET ORAL
Qty: 42 TABLET | Refills: 0 | Status: SHIPPED | OUTPATIENT
Start: 2021-10-20 | End: 2021-10-20 | Stop reason: SDUPTHER

## 2021-10-20 RX ORDER — POLYETHYLENE GLYCOL 3350 17 G/17G
17 POWDER, FOR SOLUTION ORAL DAILY
Qty: 255 G | Refills: 1 | Status: SHIPPED | OUTPATIENT
Start: 2021-10-20 | End: 2021-10-20 | Stop reason: SDUPTHER

## 2021-10-20 RX ORDER — ONDANSETRON 4 MG/1
4 TABLET, ORALLY DISINTEGRATING ORAL
Qty: 30 TABLET | Refills: 1 | Status: SHIPPED | OUTPATIENT
Start: 2021-10-20 | End: 2022-01-11

## 2021-10-20 RX ORDER — ONDANSETRON 4 MG/1
4 TABLET, ORALLY DISINTEGRATING ORAL
Qty: 30 TABLET | Refills: 1 | Status: SHIPPED | OUTPATIENT
Start: 2021-10-20 | End: 2021-10-20 | Stop reason: SDUPTHER

## 2021-10-20 RX ORDER — OXYCODONE AND ACETAMINOPHEN 7.5; 325 MG/1; MG/1
1-2 TABLET ORAL
Qty: 42 TABLET | Refills: 0 | Status: SHIPPED | OUTPATIENT
Start: 2021-10-20 | End: 2021-10-27

## 2021-10-20 NOTE — PATIENT INSTRUCTIONS
Dr. Macey Esquivel Pre-operative Instructions:      Patient: Tamar Melgar   :  1960     I understand I am to stop taking oral birth control pills, hormonal replacement therapy and all Aspirin, Aspirin containing medications, Non-Steroidal Anti-Inflammatory medications (such as Advil, Aleve, Motrin, Ibuprofen) and or Blood thinner medication such as Coumadin, Plavix, Heparin or others 5 days prior to surgery. I understand I am to STOP taking these Medications 5 days prior to surgery:  I am to get instructions from my prescribing physician. 1. __As listed above_______________________  2. _____________________________________  3. _____________________________________  4. _____________________________________    I understand that if I am taking daily medications for high blood pressure, I can take them the morning of surgery with a small sip of water. I will consult my prescribing physician or call [facility] with specific questions. I also understand that:     I am to report important observations or changes that may occur prior to surgery. If I have any changes in my physical condition, such as a rash, a fever, sore throat, abscess, ulcers, nausea, vomiting, or diarrhea. I am to call the office and I am to consult my primary care physician to assess and treat the problem.  I am not to eat or drink anything after midnight the night before my surgery.  I am not to drink alcoholic beverages 24 hours prior to surgery.  I am not to do any illegal drugs prior to surgery.  I am not to smoke at least 24 hours prior to surgery.  I am able and will shower or bathe before surgery. I will use the Hibiclens solution on my surgical site only. The hibiclens directions are one packet a day starting two days before surgery.  I will remove any nail polish, make-up or jewelry prior to arriving for my surgery.       If I wear glasses, contact lenses or dentures they must be removed prior to going to the operating room.  All body piercing and artifical eye-lashes must be removed prior to surgery     I will not wear any aerosol sprays, perfumes or skin creams.  I am to make arrangements for a family member or friend to accompany me to surgery and take me home after my surgery as I will not be allowed to leave the hospital alone. A cab or bus will not be acceptable. Please make arrange for someone to stay with you for 24 hours after surgery.  Patient has expressed understanding of the diagnosis, treatment and planned surgery      Post operative medications will be e-scribed to your pharmacy on record if possible        Acute Pain After Surgery: Care Instructions  Your Care Instructions      It's common to have some pain after surgery. Pain doesn't mean that something is wrong or that the surgery didn't go well. But when the pain is severe, it's important to work with your doctor to manage it. It's also important to be aware of a few facts about pain and pain medicine. · You are the only person who knows what your pain feels like. So be sure to tell your doctor when you are in pain or when the pain changes. Then he or she will know how to adjust your medicines. · Pain is often easier to control right after it starts. So it may be better to take regular doses of pain medicine and not wait until the pain gets bad. · Medicine can help control pain. But this doesn't mean you'll have no pain. Medicine works to keep the pain at a level you can live with. With time, you will feel better. Follow-up care is a key part of your treatment and safety. Be sure to make and go to all appointments, and call your doctor if you are having problems. It's also a good idea to know your test results and keep a list of the medicines you take. How can you care for yourself at home? · Be safe with medicines. Read and follow all instructions on the label.   ¨ If the doctor gave you a prescription medicine for pain, take it as prescribed. ¨ If you are not taking a prescription pain medicine, ask your doctor if you can take an over-the-counter medicine. · If you take an over-the-counter pain medicine, such as acetaminophen (Tylenol), ibuprofen (Advil, Motrin), or naproxen (Aleve), read and follow all instructions on the label. · Do not take two or more pain medicines at the same time unless the doctor told you to. · Do not drink alcohol while you are taking pain medicines. · Try to walk each day if your doctor recommends it. Start by walking a little more than you did the day before. Bit by bit, increase the amount you walk. Walking increases blood flow. It also helps prevent pneumonia and constipation. · To prevent constipation from opioid pain medicines:  ¨ Talk to your doctor about a laxative. ¨ Include fruits, vegetables, beans, and whole grains in your diet each day. These foods are high in fiber. ¨ Drink plenty of fluids, enough so that your urine is light yellow or clear like water. Drink water, fruit juice, or other drinks that do not contain caffeine or alcohol. If you have kidney, heart, or liver disease and have to limit fluids, talk with your doctor before you increase the amount of fluids you drink. ¨ Take a fiber supplement, such as Citrucel or Metamucil, every day if needed. Read and follow all instructions on the label. If you take pain medicine for more than a few days, talk to your doctor before you take fiber. When should you call for help? Call your doctor now or seek immediate medical care if:   · Your pain gets worse. · Your pain is not controlled by medicine. Watch closely for changes in your health, and be sure to contact your doctor if you have any problems.

## 2021-10-20 NOTE — PROGRESS NOTES
Preoperative History and physical was completed during this encounter. Please see the H&P note for details. Orders Placed This Encounter    DISCONTD: oxyCODONE-acetaminophen (Percocet) 7.5-325 mg per tablet     Sig: Take 1-2 Tablets by mouth every eight (8) hours as needed for Pain (post operative) for up to 7 days. Max Daily Amount: 6 Tablets. Indications: pain     Dispense:  42 Tablet     Refill:  0    DISCONTD: polyethylene glycol (Miralax) 17 gram/dose powder     Sig: Take 17 g by mouth daily. Dispense:  255 g     Refill:  1    DISCONTD: ondansetron (ZOFRAN ODT) 4 mg disintegrating tablet     Sig: Take 1 Tablet by mouth every eight (8) hours as needed for Nausea or Vomiting. Indications: prevent nausea and vomiting after surgery     Dispense:  30 Tablet     Refill:  1    oxyCODONE-acetaminophen (Percocet) 7.5-325 mg per tablet     Sig: Take 1-2 Tablets by mouth every eight (8) hours as needed for Pain (post operative) for up to 7 days. Max Daily Amount: 6 Tablets. Indications: pain     Dispense:  42 Tablet     Refill:  0    polyethylene glycol (Miralax) 17 gram/dose powder     Sig: Take 17 g by mouth daily. Dispense:  255 g     Refill:  1    ondansetron (ZOFRAN ODT) 4 mg disintegrating tablet     Sig: Take 1 Tablet by mouth every eight (8) hours as needed for Nausea or Vomiting. Indications: prevent nausea and vomiting after surgery     Dispense:  30 Tablet     Refill:  1          Visit Vitals  BP (!) 140/88   Temp 97.3 °F (36.3 °C)   LMP 12/31/1996         Pain Assessment  10/20/2021   Location of Pain Shoulder   Location Modifiers Right   Severity of Pain 7   Quality of Pain Dull;Aching   Duration of Pain -   Frequency of Pain Constant   Aggravating Factors Other (Comment)   Aggravating Factors Comment -   Limiting Behavior Yes   Relieving Factors Rest   Relieving Factors Comment -   Result of Injury -           ICD-10-CM ICD-9-CM   1.  Complete tear of right rotator cuff, unspecified whether traumatic  M75.121 727.61   2. Pre-operative examination  Z01.818 V72.84         Chaparrita Billings [facility], MPA, PA-C  10/20/2021  3:26 PM

## 2021-11-02 ENCOUNTER — TELEPHONE (OUTPATIENT)
Dept: ORTHOPEDIC SURGERY | Age: 61
End: 2021-11-02

## 2021-11-02 NOTE — TELEPHONE ENCOUNTER
Patient called stating she believes she is having side affects from her Percocet. She stated she started taking it yesterday, and was only able to urinate twice yesterday. She stated she also has not had a bowel movement, and has taken her Miralax. She stated she knows that she has to go but cannot go. She also stated she started having itching throughout her body, and took Benadryl which gave relief. She stated she also did not sleep well last night, and has had to take two Percocet due to the amount of pain she is in. Patient had right arthroscopic rotator cuff repair yesterday. She is requesting a call back to discuss this. Please advise patient at [phone].

## 2021-11-02 NOTE — TELEPHONE ENCOUNTER
Patient advised to hydrate, continue stool softener, benadryl for itching, and stress if breathing or condition worsens go to ED.

## 2021-11-02 NOTE — TELEPHONE ENCOUNTER
Stay hydrated. Continue on stool softener    Continue benadryl for itching. Normal side-effect. If having any breathing trouble or trouble with urinating go to the ER.

## 2021-11-08 ENCOUNTER — OFFICE VISIT (OUTPATIENT)
Dept: ORTHOPEDIC SURGERY | Age: 61
End: 2021-11-08
Payer: COMMERCIAL

## 2021-11-08 VITALS
OXYGEN SATURATION: 92 % | HEART RATE: 81 BPM | TEMPERATURE: 97.5 F | BODY MASS INDEX: 30.23 KG/M2 | WEIGHT: 170.6 LBS | HEIGHT: 63 IN

## 2021-11-08 DIAGNOSIS — M75.121 COMPLETE TEAR OF RIGHT ROTATOR CUFF, UNSPECIFIED WHETHER TRAUMATIC: Primary | ICD-10-CM

## 2021-11-08 PROCEDURE — 99024 POSTOP FOLLOW-UP VISIT: CPT | Performed by: ORTHOPAEDIC SURGERY

## 2021-11-08 RX ORDER — TRAMADOL HYDROCHLORIDE 50 MG/1
50 TABLET ORAL
Qty: 28 TABLET | Refills: 0 | Status: SHIPPED | OUTPATIENT
Start: 2021-11-08 | End: 2021-11-15

## 2021-11-08 NOTE — PROGRESS NOTES
Poppy Iglesias  1960     HISTORY OF PRESENT ILLNESS  Poppy Iglesias is a 62 y.o. female who presents today for evaluation s/p Right shoulder arthroscopic rotator cuff repair on 11/01/2021. Patient has been going to PT. Describes pain as a 6/10. Has been taking pain medication. Still has night pain. Has pain with putting on clothes. She notes itchiness with taking pain medication. Patient denies any fever, chills, chest pain, shortness of breath or calf pain. There are no new illness or injuries to report since last seen in the office. PHYSICAL EXAM:   Visit Vitals  Pulse 81   Temp 97.5 °F (36.4 °C) (Temporal)   Ht 5' 3\" (1.6 m)   Wt 170 lb 9.6 oz (77.4 kg)   LMP 12/31/1996   SpO2 92%   BMI 30.22 kg/m²      The patient is a well-developed, well-nourished female in no acute distress. The patient is alert and oriented times three. The patient appears to be well groomed. Mood and affect are normal.  ORTHOPEDIC EXAM of Right shoulder:  Inspection: swelling present,  Bruising present  Incision, clean, dry, intact, sutures in place  Passive glenohumeral abduction 0-45 degrees  Stability: Stable  Strength: n/a  2+ distal pulses    IMPRESSION:   S/p Right shoulder arthroscopic rotator cuff repair     PLAN: Pt presents  s/p Right shoulder arthroscopic rotator cuff repair on 11/01/2021. Incisions cleaned, steri strips applied. Patient to continue with PROM and PT. Continue wearing sling. Was prescribed Tramadol to alternate taking with Tylenol. Patient given pain medication for short term acute pain relief. Goal is to treat patient according to above plan and to ultimately have patient off all pain medications once appropriate. If chronic pain management is required beyond what is expected for current orthopedic problem, will refer patient to pain management.   was reviewed and will be reviewed with every medication refill request.       RTC 4 weeks    Scribed by Monster Romero [facility]) as dictated by Alex Malhotra MD    I, Dr. Alex Malhotra, confirm that all documentation is accurate.     Alex Malhotra M.D.   [facility]

## 2021-11-09 ENCOUNTER — TELEPHONE (OUTPATIENT)
Dept: ORTHOPEDIC SURGERY | Age: 61
End: 2021-11-09

## 2021-11-09 NOTE — TELEPHONE ENCOUNTER
Dr. Drew Books will evaluate her progress and need for further PT or work extension at her [address] Chaparrita Preciado [facility], MPA, PA-C  11/9/2021  3:21 PM

## 2021-11-09 NOTE — TELEPHONE ENCOUNTER
Patient called in stating she scheduled to return to work in January 2022, and she is also scheduled to begin physical therapy soon.  She is asking how long will her therapy continue and if its past January, is she able to have an extended return to work note written to give to her employer    Patient's contact is [phone]

## 2021-11-09 NOTE — TELEPHONE ENCOUNTER
Spoke with patient, notified her after a rotator cuff repair patient's are typically in PT for 10-12 weeks. Patient states as of now she is scheduled to be out of work until January 23rd. 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 Notified patient she has a f/u on 12/6/21 with Dr. Magnolia Mckinney and depending on her progress with PT and post op status, her work status will be addressed at that time. Patient verbalized understanding.

## 2021-11-16 ENCOUNTER — HOSPITAL ENCOUNTER (OUTPATIENT)
Dept: PHYSICAL THERAPY | Age: 61
Discharge: HOME | End: 2021-11-16
Attending: ORTHOPAEDIC SURGERY
Payer: COMMERCIAL

## 2021-11-16 PROCEDURE — 97535 SELF CARE MNGMENT TRAINING: CPT

## 2021-11-16 PROCEDURE — 97110 THERAPEUTIC EXERCISES: CPT

## 2021-11-16 PROCEDURE — 97161 PT EVAL LOW COMPLEX 20 MIN: CPT

## 2021-11-16 NOTE — PROGRESS NOTES
PT DAILY TREATMENT NOTE  10-18    Patient Name: Osman Curtis  Date:2021  : 1960  [x]  Patient  Verified  Payor: Nohemi Trivedi / [place]: [place] [employer] / Product Type: PPO /    In time:2:02  Out time:2:46  Total Treatment Time (min): 44  Visit #: 1 of 10    Treatment Area: Right shoulder pain [M25.511]    SUBJECTIVE  Pain Level (0-10 scale): 6-7  Any medication changes, allergies to medications, adverse drug reactions, diagnosis change, or new procedure performed?: [x] No    [] Yes (see summary sheet for update)  Subjective functional status/changes:   [] No changes reported  See POC    OBJECTIVE    22 min [x]Eval                  []Re-Eval     12 min Therapeutic Exercise:  [] See flow sheet : HEP instruction and demonstration   Rationale: increase ROM and increase strength to improve the patients ability to tolerate ADLs    10 min Self Care/Home Management: []  See flow sheet : pt education regarding anatomy and physiology of the UEs and how it relates to the pt's condition, pt education on avoiding AROM of the right shoulder until cleared by the doctor. Rationale: increase ROM, increase strength and decrease pain/symptoms  to improve the patients ability to tolerate functional tasks. With   [x] TE   [] TA   [] neuro   [x] Other: Self Care/Home management Patient Education: [x] Review HEP    [] Progressed/Changed HEP based on:   [] positioning   [] body mechanics   [] transfers   [] heat/ice application    [] other:      Other Objective/Functional Measures: See evaluation. Pain Level (0-10 scale) post treatment: 6-7    ASSESSMENT/Changes in Function: Pt given HEP handout to perform. Pt understood exercises in HEP handout. Pt demonstrated decreased PROM, muscle tightness, tenderness to palpation, and increased swelling. No drainage noted on the band-aids over the incisions. PROM right shoulder flex 114 degs, ABD 46 degs, ER 46 degs and IR 63 degs (ER/IR at 35 degs of ABD).  Pt would benefit from physical therapy to improve the above impairments to help the pt return to performing ADLs, functional and work activities. Patient will continue to benefit from skilled PT services to modify and progress therapeutic interventions, address functional mobility deficits, address ROM deficits, address strength deficits, analyze and address soft tissue restrictions, analyze and cue movement patterns, analyze and modify body mechanics/ergonomics, assess and modify postural abnormalities and instruct in home and community integration to attain remaining goals. [x]  See Plan of Care  []  See progress note/recertification  []  See Discharge Summary         Progress towards goals / Updated goals:  Short Term Goals: To be accomplished in 2 treatments:  1. Pt will report compliance and independence to [facility] to help the pt manage their pain and symptoms. Eval: established   Long Term Goals: To be accomplished in 10 treatments:  1. Pt will increase FOTO score by 10 points to improve ability to perform ADLs. Eval: 32 points total  2. Pt will report an improvement in at worst pain to 6/10 to improve ability to perform daily tasks. Eval: 10/10  3. Pt will increase PROM right shoulder flex to 140 degs, ABD to 90 degs to improve ability to bathe with less pain. Eval: flex 114 degs, ABD 46 degs  4. Pt will be able to don/doff a shirt/sling with no difficulty/pain to improve independence with dressing.    Eval: increased difficulty and pain with donning/doffing a shirt/sling    PLAN  [x]  Upgrade activities as tolerated     [x]  Continue plan of care  [x]  Update interventions per flow sheet       []  Discharge due to:_  []  Other:_      Melisa Ruiz, PT 11/16/2021  3:40 PM    Future Appointments   Date Time Provider Vernon Lawson   11/23/2021  1:15 PM Christi Weber PTA [facility]   11/30/2021 10:15 AM Meg Jerez PT [facility]   12/2/2021 10:15 AM Christi Weber PTA [facility]   12/6/2021  1:00 PM Ariel Leonard MD [facility]   12/10/2021 10:30 AM Vamshi Adams MD [facility]

## 2021-11-16 NOTE — PROGRESS NOTES
[facility]        [address]         [place], [address] [place]   [phone] fax    Plan of Care/ Statement of Necessity for Physical Therapy Services  Patient name: Funmilayo Saba Start of Care: 2021   Referral source: Lizzy Kaufman,* : 1960    Medical Diagnosis: Right shoulder pain [M25.511]  Payor: Janeth James / Plan: [place] [employer] [place] / Product Type: PPO /  Onset Date: 2021    Treatment Diagnosis: right shoulder pain   Prior Hospitalization: see medical history Provider#: [id number]   Medications: Verified on Patient summary List    Comorbidities: HTN   Prior Level of Function: Independent with ADLs, functional, and work tasks with pain in the right shoulder. The Plan of Care and following information is based on the information from the initial evaluation. Assessment/ key information:   Pt is a 62year old female who presents to therapy today with right shoulder pain s/p rotator cuff repair DOS 2021. Pt denies any complications with the surgery. Pt reports compliance with the sling. She reports having difficulty with dressing and donning/doffing her sling. Per pt chart, the pt requested to start therapy the week of 11/15/2021 secondary to pt report of moving even though doctor's referral stated to start 2021. Pt demonstrated decreased PROM, muscle tightness, tenderness to palpation, and increased swelling. No drainage noted on the band-aids over the incisions. PROM right shoulder flex 114 degs, ABD 46 degs, ER 46 degs and IR 63 degs (ER/IR at 35 degs of ABD). Pt would benefit from physical therapy to improve the above impairments to help the pt return to performing ADLs, functional and work activities.      Evaluation Complexity History MEDIUM  Complexity : 1-2 comorbidities / personal factors will impact the outcome/ POC ; Examination MEDIUM Complexity : 3 Standardized tests and measures addressing body structure, function, activity limitation and / or participation in recreation  ;Presentation LOW Complexity : Stable, uncomplicated  ;Clinical Decision Making MEDIUM Complexity : FOTO score of 26-74  Overall Complexity Rating: LOW   Problem List: pain affecting function, decrease ROM, decrease strength, edema affecting function, decrease ADL/ functional abilitiies, decrease activity tolerance, decrease flexibility/ joint mobility and decrease transfer abilities   Treatment Plan may include any combination of the following: Therapeutic exercise, Therapeutic activities, Neuromuscular re-education, Physical agent/modality, Manual therapy, Patient education, Self Care training, Functional mobility training and Home safety training  Patient / Family readiness to learn indicated by: asking questions, trying to perform skills and interest  Persons(s) to be included in education: patient (P)  Barriers to Learning/Limitations: None  Patient Goal (s): complete movement  Patient Self Reported Health Status: good  Rehabilitation Potential: good    Short Term Goals: To be accomplished in 2 treatments:  1. Pt will report compliance and independence to [facility] to help the pt manage their pain and symptoms. Eval: established   Long Term Goals: To be accomplished in 10 treatments:  1. Pt will increase FOTO score by 10 points to improve ability to perform ADLs. Eval: 32 points total  2. Pt will report an improvement in at worst pain to 6/10 to improve ability to perform daily tasks. Eval: 10/10  3. Pt will increase PROM right shoulder flex to 140 degs, ABD to 90 degs to improve ability to bathe with less pain. Eval: flex 114 degs, ABD 46 degs  4. Pt will be able to don/doff a shirt/sling with no difficulty/pain to improve independence with dressing. Eval: increased difficulty and pain with donning/doffing a shirt/sling    Frequency / Duration: Patient to be seen 2-3 times per week for 10 treatments.     Patient/ Caregiver education and instruction: Diagnosis, prognosis, self care, activity modification and exercises   [x]  Plan of care has been reviewed with EMMANUEL Means, PT 11/16/2021 4:26 PM  _____________________________________________________________________  I certify that the above Therapy Services are being furnished while the patient is under my care. I agree with the treatment plan and certify that this therapy is necessary.     Physician's Signature:____________________  Date:__________Time:______    Please sign and return to [facility]           [address], [address] [place]   [phone] fax

## 2021-11-19 DIAGNOSIS — M75.121 COMPLETE TEAR OF RIGHT ROTATOR CUFF, UNSPECIFIED WHETHER TRAUMATIC: Primary | ICD-10-CM

## 2021-11-19 NOTE — TELEPHONE ENCOUNTER
Last Visit: 11/8/21 with MD Pina Lerma  Next Appointment: 12/6/21 with MD Pina Lerma  Previous Refill Encounter(s): 11/8/21 #28    Requested Prescriptions     Pending Prescriptions Disp Refills    traMADoL (ULTRAM) 50 mg tablet 28 Tablet 0     Sig: Take 1 Tablet by mouth every six (6) hours as needed for Pain for up to 7 days. Max Daily Amount: 200 mg.

## 2021-11-22 NOTE — TELEPHONE ENCOUNTER
Patient called in regards to the previous medication refill request. Patient's contact is [phone]

## 2021-11-23 ENCOUNTER — HOSPITAL ENCOUNTER (OUTPATIENT)
Dept: PHYSICAL THERAPY | Age: 61
Discharge: HOME | End: 2021-11-23
Attending: ORTHOPAEDIC SURGERY
Payer: COMMERCIAL

## 2021-11-23 PROCEDURE — 97110 THERAPEUTIC EXERCISES: CPT

## 2021-11-23 PROCEDURE — 97140 MANUAL THERAPY 1/> REGIONS: CPT

## 2021-11-23 RX ORDER — TRAMADOL HYDROCHLORIDE 50 MG/1
50 TABLET ORAL
Qty: 28 TABLET | Refills: 0 | Status: SHIPPED | OUTPATIENT
Start: 2021-11-23 | End: 2021-11-30

## 2021-11-23 NOTE — PROGRESS NOTES
PT DAILY TREATMENT NOTE     Patient Name: Clyde Zamudio  Date:2021  : 1960  [x]  Patient  Verified  Payor: [place] / Plan: [place] [employer] [place] / Product Type: PPO /    In time:1:47  Out time:2:19  Total Treatment Time (min): 31  Visit #: 2 of 10    Treatment Area: Right shoulder pain [M25.511]    SUBJECTIVE  Pain Level (0-10 scale): 0  Any medication changes, allergies to medications, adverse drug reactions, diagnosis change, or new procedure performed?: [x] No    [] Yes (see summary sheet for update)  Subjective functional status/changes:   [] No changes reported  Patient reports that her shoulder gets a little sore after a warm shower. OBJECTIVE        19 min Therapeutic Exercise:  [x] See flow sheet :   Rationale: increase ROM, increase strength and improve coordination to improve the patients ability to increase ease with ADLS. 12 min Manual Therapy:    Supine- PROM right shoulder within protocol. The manual therapy interventions were performed at a separate and distinct time from the therapeutic activities interventions. Rationale: decrease pain, increase ROM, increase tissue extensibility and decrease trigger points to increase ease with dressing. With   [] TE   [] TA   [] neuro   [] other: Patient Education: [x] Review HEP    [] Progressed/Changed HEP based on:   [] positioning   [] body mechanics   [] transfers   [] heat/ice application    [] other:      Other Objective/Functional Measures: pt reports performing HEP. Pain Level (0-10 scale) post treatment: 0    ASSESSMENT/Changes in Function: Initiated exercises per POC.  Patient with minimal guarding with manual.     Patient will continue to benefit from skilled PT services to modify and progress therapeutic interventions, address functional mobility deficits, address ROM deficits, address strength deficits, analyze and address soft tissue restrictions and analyze and cue movement patterns to attain remaining goals. []  See Plan of Care  []  See progress note/recertification  []  See Discharge Summary         Progress towards goals / Updated goals:  Short Term Goals: To be accomplished in 2 treatments:  1. Pt will report compliance and independence to HEP to help the pt manage their pain and symptoms. Eval: established  Current: MET: pt reports performing HEP. 11/23/21   Long Term Goals: To be accomplished in 10 treatments:  1. Pt will increase FOTO score by 10 points to improve ability to perform ADLs. Eval: 32 points total  2. Pt will report an improvement in at worst pain to 6/10 to improve ability to perform daily tasks. Eval: 10/10  3. Pt will increase PROM right shoulder flex to 140 degs, ABD to 90 degs to improve ability to bathe with less pain. Eval: flex 114 degs, ABD 46 degs  4. Pt will be able to don/doff a shirt/sling with no difficulty/pain to improve independence with dressing.    Eval: increased difficulty and pain with donning/doffing a shirt/sling    PLAN  []  Upgrade activities as tolerated     [x]  Continue plan of care  []  Update interventions per flow sheet       []  Discharge due to:_  []  Other:_      Jaime Plunkett PTA 11/23/2021  7:49 AM    Future Appointments   Date Time Provider Vernon Lawson   11/23/2021  1:15 PM [facility] [address]   11/30/2021 10:15 AM Imtiaz Ponce PT [facility] [address]   12/2/2021 10:15 AM [facility] [address]   12/6/2021  1:00 PM Juliet Ross MD [facility] AM[facility]   12/10/2021 10:30 AM [facility]

## 2021-11-26 DIAGNOSIS — G47.00 INSOMNIA, UNSPECIFIED TYPE: ICD-10-CM

## 2021-11-26 NOTE — TELEPHONE ENCOUNTER
Reschedule patient appt to 1/11. She requesting that her Rx be refilled  Requested Prescriptions     Pending Prescriptions Disp Refills    eszopiclone (LUNESTA) 3 mg tablet 90 Tablet 0     Sig: Take 1 Tablet by mouth nightly as needed for Sleep. Max Daily Amount: 3 mg.

## 2021-11-28 RX ORDER — ESZOPICLONE 3 MG/1
3 TABLET, FILM COATED ORAL
Qty: 90 TABLET | Refills: 0 | Status: SHIPPED | OUTPATIENT
Start: 2021-12-01 | End: 2022-02-25 | Stop reason: SDUPTHER

## 2021-11-29 DIAGNOSIS — I10 ESSENTIAL HYPERTENSION: ICD-10-CM

## 2021-11-29 NOTE — TELEPHONE ENCOUNTER
Patient need a medication refill. Please advise    Requested Prescriptions     Pending Prescriptions Disp Refills    telmisartan-hydroCHLOROthiazide (MICARDIS HCT) 80-25 mg per tablet 90 Tablet 4     Sig: Take 1 Tablet by mouth daily.

## 2021-11-30 ENCOUNTER — HOSPITAL ENCOUNTER (OUTPATIENT)
Dept: PHYSICAL THERAPY | Age: 61
Discharge: HOME | End: 2021-11-30
Attending: ORTHOPAEDIC SURGERY
Payer: COMMERCIAL

## 2021-11-30 PROCEDURE — 97110 THERAPEUTIC EXERCISES: CPT

## 2021-11-30 PROCEDURE — 97140 MANUAL THERAPY 1/> REGIONS: CPT

## 2021-11-30 NOTE — PROGRESS NOTES
PT DAILY TREATMENT NOTE     Patient Name: Sunil Le  Date:2021  : 1960  [x]  Patient  Verified  Payor: Patrick Model / Plan: [employer] PPO / Product Type: PPO /    In time: 10:18   Out time: 10:58  Total Treatment Time (min): 40  Visit #: 3 of 10    Treatment Area: Right shoulder pain [M25.511]    SUBJECTIVE  Pain Level (0-10 scale): 7  Any medication changes, allergies to medications, adverse drug reactions, diagnosis change, or new procedure performed?: [x] No    [] Yes (see summary sheet for update)  Subjective functional status/changes:   [] No changes reported  Pt reports noticing some irritation under the right arimpit. OBJECTIVE    25 min Therapeutic Exercise:  [x] See flow sheet : exercises, PROM measurements   Rationale: increase ROM, increase strength and improve coordination to improve the patients ability to increase ease with ADLS. 15 min Manual Therapy: in supine: assessment of the right underarm area (see objective below), gentle right GHJ distraction, grade 2-3 inferior/poosterior GHJ mobs, DTM/TPR right UT/scalenes, PROM into right shoulder flex/ABD with gentle overpressure and oscillations after performing above   The manual therapy interventions were performed at a separate and distinct time from the therapeutic activities interventions. Rationale: decrease pain, increase ROM, increase tissue extensibility and decrease trigger points to increase ease with dressing. With   [] TE   [] TA   [] neuro   [] other: Patient Education: [x] Review HEP    [] Progressed/Changed HEP based on:   [] positioning   [] body mechanics   [] transfers   [] heat/ice application    [] other:      Other Objective/Functional Measures: right shoulder PROM:  flex 121 degs, ABD 81 degs. No redness noted around the right armpit region per visual observation.      Pain Level (0-10 scale) post treatment: 0    ASSESSMENT/Changes in Function: Reported improvement in pain post session today. Improvement in right shoulder PROM is noted since the evaluation. Educated pt to monitor the region around her right armpit to redness and irritation. Pt reports having a follow up appointment with the doctor on 12/6/2021. Continue POC as tolerated to improve PROM and pain. Patient will continue to benefit from skilled PT services to modify and progress therapeutic interventions, address functional mobility deficits, address ROM deficits, address strength deficits, analyze and address soft tissue restrictions, analyze and cue movement patterns, analyze and modify body mechanics/ergonomics, assess and modify postural abnormalities and instruct in home and community integration to attain remaining goals. []  See Plan of Care  []  See progress note/recertification  []  See Discharge Summary         Progress towards goals / Updated goals:  Short Term Goals: To be accomplished in 2 treatments:  1. Pt will report compliance and independence to HEP to help the pt manage their pain and symptoms. Eval: established  Current: MET: pt reports performing HEP. 11/23/21   Long Term Goals: To be accomplished in 10 treatments:  1. Pt will increase FOTO score by 10 points to improve ability to perform ADLs. Eval: 32 points total  2. Pt will report an improvement in at worst pain to 6/10 to improve ability to perform daily tasks. Eval: 10/10  3. Pt will increase PROM right shoulder flex to 140 degs, ABD to 90 degs to improve ability to bathe with less pain. Eval: flex 114 degs, ABD 46 degs  Current: progressing, flex 121 degs, ABD 81 degs. 11/30/2021  4. Pt will be able to don/doff a shirt/sling with no difficulty/pain to improve independence with dressing.    Eval: increased difficulty and pain with donning/doffing a shirt/sling    PLAN  [x]  Upgrade activities as tolerated     [x]  Continue plan of care  [x]  Update interventions per flow sheet       []  Discharge due to:_  []  Other:_      Nahun Reed PT 11/30/2021  10:40 AM    Future Appointments   Date Time Provider Vernon Lawson   12/2/2021 10:15 AM Veldon Maple, PTA [facility]   12/6/2021  1:00 PM Elijah Downs MD [facility]   12/7/2021 10:15 AM Veldon Maple, PTA [facility]   12/9/2021 11:00 AM Veldon Maple, PTA [facility]   12/14/2021 10:15 AM Veldon Maple, PTA [facility]   12/16/2021 11:00 AM Veldon Maple, PTA [facility]   12/20/2021 10:15 AM Veldon Maple, PTA [facility]   12/29/2021 11:00 AM Florencia Gambles [facility]   12/30/2021  1:15 PM Elis León, PT [facility]   1/11/2022 10:45 AM Liam Oconnell MD [facility] BS AMB

## 2021-12-01 RX ORDER — TELMISARTAN AND HYDROCHLORTHIAZIDE 80; 25 MG/1; MG/1
1 TABLET ORAL DAILY
Qty: 90 TABLET | Refills: 4 | Status: SHIPPED | OUTPATIENT
Start: 2021-12-01

## 2021-12-02 ENCOUNTER — HOSPITAL ENCOUNTER (OUTPATIENT)
Dept: PHYSICAL THERAPY | Age: 61
Discharge: HOME | End: 2021-12-02
Attending: ORTHOPAEDIC SURGERY
Payer: COMMERCIAL

## 2021-12-02 PROCEDURE — 97110 THERAPEUTIC EXERCISES: CPT

## 2021-12-02 PROCEDURE — 97140 MANUAL THERAPY 1/> REGIONS: CPT

## 2021-12-02 NOTE — PROGRESS NOTES
PT DAILY TREATMENT NOTE     Patient Name: Tory Yañez  Date:2021  : 1960  [x]  Patient  Verified  Payor: Deloris Pond / Plan: [place] [employer] [place] / Product Type: PPO /    In time:10:16  Out time:11:03  Total Treatment Time (min): 47  Visit #: 4 of 10      Treatment Area: Right shoulder pain [M25.511]    SUBJECTIVE  Pain Level (0-10 scale): 4  Any medication changes, allergies to medications, adverse drug reactions, diagnosis change, or new procedure performed?: [x] No    [] Yes (see summary sheet for update)  Subjective functional status/changes:   [] No changes reported  Patient reports having difficulty sleeping at night. OBJECTIVE      Modality rationale: decrease pain to improve the patients ability to decrease difficulty while performing tasks. Min Type Additional Details   10 [x]  Ice     []  heat  []  Ice massage  []  Laser   []  Anodyne Position:sitting  Location:shoulder   [] Skin assessment post-treatment:  []intact []redness- no adverse reaction        25 min Therapeutic Exercise:  [x]? See flow sheet :   Rationale: increase ROM, increase strength and improve coordination to improve the patients ability to increase ease with ADLS.                12 min Manual Therapy:    Supine- PROM right shoulder within protocol. The manual therapy interventions were performed at a separate and distinct time from the therapeutic activities interventions. Rationale: decrease pain, increase ROM, increase tissue extensibility and decrease trigger points to increase ease with dressing.            With   [] TE   [] TA   [] neuro   [] other: Patient Education: [x] Review HEP    [] Progressed/Changed HEP based on:   [] positioning   [] body mechanics   [] transfers   [] heat/ice application    [] other:      Other Objective/Functional Measures: PROM abduction ~90 deg.       Pain Level (0-10 scale) post treatment: 4    ASSESSMENT/Changes in Function: Patient has minimal guarding with manual stretching. Patient will continue to benefit from skilled PT services to modify and progress therapeutic interventions, address functional mobility deficits, address ROM deficits, address strength deficits, analyze and address soft tissue restrictions and analyze and cue movement patterns to attain remaining goals. []  See Plan of Care  []  See progress note/recertification  []  See Discharge Summary         Progress towards goals / Updated goals:  Short Term Goals: To be accomplished in 2 treatments:  1. Pt will report compliance and independence to HEP to help the pt manage their pain and symptoms.             Eval: established  Current: MET: pt reports performing HEP. 11/23/21   Long Term Goals: To be accomplished in 10 treatments:  1. Pt will increase FOTO score by 10 points to improve ability to perform ADLs. Eval: 32 points total  2. Pt will report an improvement in at worst pain to 6/10 to improve ability to perform daily tasks. Eval: 10/10  3. Pt will increase PROM right shoulder flex to 140 degs, ABD to 90 degs to improve ability to bathe with less pain. Eval: flex 114 degs, ABD 46 degs  Current: progressing, flex 121 degs, ABD 81 degs. 11/30/2021  4. Pt will be able to don/doff a shirt/sling with no difficulty/pain to improve independence with dressing.    Eval: increased difficulty and pain with donning/doffing a shirt/sling       PLAN  []  Upgrade activities as tolerated     [x]  Continue plan of care  []  Update interventions per flow sheet       []  Discharge due to:_  []  Other:_      Yamila Contreras, PTA 12/2/2021  10:19 AM    Future Appointments   Date Time Provider Vernon Lawson   12/6/2021  1:00 PM Kathleen Conteh MD [facility]   12/7/2021 10:15 AM Mc De Leon PTA [facility]   12/9/2021 11:00 AM Mc De Leon PTA [facility]   12/14/2021 10:15 AM Mc De Leon PTA [facility]   12/16/2021 11:00 AM Mc De Leon PTA [facility]   12/20/2021 10:15 AM Lianet Goodman Barry Acuna [facility]   12/29/2021 11:00 AM Rocío Saez PTA [facility]   12/30/2021  1:15 PM Zain Veras PT [facility]   1/11/2022 10:45 AM Jayce Tamayo MD [facility] AM[facility]

## 2021-12-06 ENCOUNTER — OFFICE VISIT (OUTPATIENT)
Dept: ORTHOPEDIC SURGERY | Age: 61
End: 2021-12-06
Payer: COMMERCIAL

## 2021-12-06 ENCOUNTER — DOCUMENTATION ONLY (OUTPATIENT)
Dept: ORTHOPEDIC SURGERY | Age: 61
End: 2021-12-06

## 2021-12-06 VITALS
HEIGHT: 63 IN | OXYGEN SATURATION: 99 % | WEIGHT: 170 LBS | HEART RATE: 99 BPM | BODY MASS INDEX: 30.12 KG/M2 | TEMPERATURE: 97.3 F

## 2021-12-06 DIAGNOSIS — M75.121 COMPLETE TEAR OF RIGHT ROTATOR CUFF, UNSPECIFIED WHETHER TRAUMATIC: Primary | ICD-10-CM

## 2021-12-06 PROCEDURE — 99024 POSTOP FOLLOW-UP VISIT: CPT | Performed by: ORTHOPAEDIC SURGERY

## 2021-12-06 NOTE — PROGRESS NOTES
Patient dropped off [facility] Disclosure Authorization\" form.  Forms placed in HV office forms bin on 12/6/2021

## 2021-12-06 NOTE — PROGRESS NOTES
Jeaneth Michael  1960     HISTORY OF PRESENT ILLNESS  Jeaneth Michael is a 62 y.o. female who presents today for evaluation s/p Right shoulder arthroscopic rotator cuff repair on 11/01/2021. Patient has been going to PT. Describes pain as a 7/10. Has been taking pain medication. Still has night pain and pain in the morning. PT has been causing pain but she does not think they are overdoing it. Has some posterior pain and itchiness under her armpit. Patient denies any fever, chills, chest pain, shortness of breath or calf pain. There are no new illness or injuries to report since last seen in the office. PHYSICAL EXAM:   Visit Vitals  Pulse 99   Temp 97.3 °F (36.3 °C) (Temporal)   Ht 5' 3\" (1.6 m)   Wt 170 lb (77.1 kg)   LMP 12/31/1996   SpO2 99%   BMI 30.11 kg/m²      The patient is a well-developed, well-nourished female in no acute distress. The patient is alert and oriented times three. The patient appears to be well groomed. Mood and affect are normal.  ORTHOPEDIC EXAM of Right shoulder:  Inspection: swelling present,  Bruising present  Incision, clean, dry, intact, sutures in place  Passive glenohumeral abduction 0- 60 degrees  Stability: Stable  Strength: n/a  2+ distal pulses      IMPRESSION:   S/p Right shoulder arthroscopic rotator cuff repair       PLAN: Pt presents  s/p Right shoulder arthroscopic rotator cuff repair on 11/01/2021. I emphasized the importance of using the good arm to help the bad arm and use pain as guide. Continue with PT. D/c sling in one week and start AROM. RTC 4 weeks    Scribed by Lennon Cowden Deborah Ra) as dictated by Ryan Morin MD    I, Dr. Ryan Morin, confirm that all documentation is accurate.     Ryan Morin M.D.   [facility]

## 2021-12-07 ENCOUNTER — APPOINTMENT (OUTPATIENT)
Dept: PHYSICAL THERAPY | Age: 61
End: 2021-12-07
Attending: ORTHOPAEDIC SURGERY
Payer: COMMERCIAL

## 2021-12-08 ENCOUNTER — DOCUMENTATION ONLY (OUTPATIENT)
Dept: ORTHOPEDIC SURGERY | Age: 61
End: 2021-12-08

## 2021-12-08 NOTE — PROGRESS NOTES
[employer] disability form completed and faxed--did not fill out duplicate form--returned to pt--ready for  at [facility] location

## 2021-12-09 ENCOUNTER — HOSPITAL ENCOUNTER (OUTPATIENT)
Dept: PHYSICAL THERAPY | Age: 61
Discharge: HOME | End: 2021-12-09
Attending: ORTHOPAEDIC SURGERY
Payer: COMMERCIAL

## 2021-12-09 PROCEDURE — 97140 MANUAL THERAPY 1/> REGIONS: CPT

## 2021-12-09 PROCEDURE — 97110 THERAPEUTIC EXERCISES: CPT

## 2021-12-09 NOTE — PROGRESS NOTES
PT DAILY TREATMENT NOTE     Patient Name: Darrion Melendez  Date:2021  : 1960  [x]  Patient  Verified  Payor: Joseph Venegas / [place]: [place] [employer] [place] / Product Type: PPO /    In time:11:02  Out time:11:48  Total Treatment Time (min): 46  Visit #: 5 of 10      Treatment Area: Right shoulder pain [M25.511]    SUBJECTIVE  Pain Level (0-10 scale): 6  Any medication changes, allergies to medications, adverse drug reactions, diagnosis change, or new procedure performed?: [x] No    [] Yes (see summary sheet for update)  Subjective functional status/changes:   [] No changes reported  Patient states her doctors appointment went well. OBJECTIVE        36 min Therapeutic Exercise:  [x]? ? See flow sheet :   Rationale: increase ROM, increase strength and improve coordination to improve the patients ability to increase ease with ADLS.       12 min Manual Therapy:    Supine- PROM right shoulder within protocol.    The manual therapy interventions were performed at a separate and distinct time from the therapeutic activities interventions. Rationale: decrease pain, increase ROM, increase tissue extensibility and decrease trigger points to increase ease with dressing.                  With   [] TE   [] TA   [] neuro   [] other: Patient Education: [x] Review HEP    [] Progressed/Changed HEP based on:   [] positioning   [] body mechanics   [] transfers   [] heat/ice application    [] other:      Other Objective/Functional Measures: Supine PROM right shoulder flex 131 degs,  degs. Pain Level (0-10 scale) post treatment: 0    ASSESSMENT/Changes in Function: Initiated light AAROM per protocol. Patient continue to be guarded with PROM, but continues to make gains with motion.      Patient will continue to benefit from skilled PT services to modify and progress therapeutic interventions, address functional mobility deficits, address ROM deficits, address strength deficits, analyze and address soft tissue restrictions and analyze and cue movement patterns to attain remaining goals. []  See Plan of Care  []  See progress note/recertification  []  See Discharge Summary         Progress towards goals / Updated goals:  Short Term Goals: To be accomplished in 2 treatments:  1. Pt will report compliance and independence to HEP to help the pt manage their pain and symptoms.             Eval: established  Current: MET: pt reports performing HEP. 11/23/21   Long Term Goals: To be accomplished in 10 treatments:  1. Pt will increase FOTO score by 10 points to improve ability to perform ADLs. Eval: 32 points total  2. Pt will report an improvement in at worst pain to 6/10 to improve ability to perform daily tasks. Eval: 10/10  3. Pt will increase PROM right shoulder flex to 140 degs, ABD to 90 degs to improve ability to bathe with less pain. Eval: flex 114 degs, ABD 46 degs  Current: progressing,Supine PROM right shoulder flex 131 degs,  degs. 12/9/2021  4. Pt will be able to don/doff a shirt/sling with no difficulty/pain to improve independence with dressing.    Eval: increased difficulty and pain with donning/doffing a shirt/sling    PLAN  []  Upgrade activities as tolerated     [x]  Continue plan of care  []  Update interventions per flow sheet       []  Discharge due to:_  []  Other:_      Lynnette Oreilly PTA 12/9/2021  11:06 AM    Future Appointments   Date Time Provider Vernon Lawson   12/14/2021 10:15 AM Sharon Duverney, PTA [facility]   12/16/2021 11:00 AM Sharon Duverney, PTA [facility]   12/20/2021 10:15 AM Sharon Duverney, PTA [facility]   12/29/2021 11:00 AM Sharon Duverney, PTA [facility]   12/30/2021  1:15 PM Louie Anand, PT [facility]   1/11/2022 10:45 AM Lincoln Davalos MD [facility] AMB   1/24/2022  1:10 PM Sandy Padgett MD [facility] BS AMB

## 2021-12-14 ENCOUNTER — TELEPHONE (OUTPATIENT)
Dept: ORTHOPEDIC SURGERY | Age: 61
End: 2021-12-14

## 2021-12-14 ENCOUNTER — APPOINTMENT (OUTPATIENT)
Dept: PHYSICAL THERAPY | Age: 61
End: 2021-12-14
Attending: ORTHOPAEDIC SURGERY
Payer: COMMERCIAL

## 2021-12-14 NOTE — TELEPHONE ENCOUNTER
She can take a break from PT to help her pain. She will need to continue her exercises at home as best she can so she doesn't get stiff. She should only miss 1-2 sessions and then start back in PT. If she is worried about her incision she will need to come in and be evaluated. Please have her seen Dr. Gilson Ayala or Larissa Guillen for this.

## 2021-12-14 NOTE — TELEPHONE ENCOUNTER
Patient noticed some puss & blood oozing from the incision directly on her shoulder last night (she also has an incision on her back). She put peroxide on it and a band aid, however, the area is very painful. She's scheduled to have physical therapy today at 10 and isn't sure if she should attend. Please contact patient to further discuss, [phone].

## 2021-12-14 NOTE — TELEPHONE ENCOUNTER
Spoke with patient and informed patient of [employer]. Patient verbealized understanding. She will keep an eye on the incision and if it gets worse she will scheduled and appointment with Dr. Avi Pruitt or Rashad Avila.

## 2021-12-16 ENCOUNTER — APPOINTMENT (OUTPATIENT)
Dept: PHYSICAL THERAPY | Age: 61
End: 2021-12-16
Attending: ORTHOPAEDIC SURGERY
Payer: COMMERCIAL

## 2021-12-20 ENCOUNTER — HOSPITAL ENCOUNTER (OUTPATIENT)
Dept: PHYSICAL THERAPY | Age: 61
Discharge: HOME | End: 2021-12-20
Attending: ORTHOPAEDIC SURGERY
Payer: COMMERCIAL

## 2021-12-20 PROCEDURE — 97110 THERAPEUTIC EXERCISES: CPT

## 2021-12-20 PROCEDURE — 97112 NEUROMUSCULAR REEDUCATION: CPT

## 2021-12-20 PROCEDURE — 97140 MANUAL THERAPY 1/> REGIONS: CPT

## 2021-12-20 NOTE — PROGRESS NOTES
[facility]              [address]        [place], [address] [place]   [phone] fax    Progress Note  Patient name: Darrion Melendez Start of Care: 2021   Referral source: Luna Juarez,* : 1960   Medical/Treatment Diagnosis: Right shoulder pain [M25.511]  Payor: Joseph Venegas / Plan: [place] [employer] P[place] / Product Type: PPO /  Onset Date:DoS 2021     Prior Hospitalization: see medical history Provider#: [id number]   Medications: Verified on Patient Summary List     Comorbidities: HTN   Prior Level of Function: Independent with ADLs, functional, and work tasks with pain in the right shoulder. Visits from Start of Care:6    Missed Visits: 3    Established Goals:          Short Term Goals: To be accomplished in 2 treatments:  1. Pt will report compliance and independence to HEP to help the pt manage their pain and symptoms. Eval: established  Current: MET: pt reports performing HEP. Long Term Goals: To be accomplished in 10 treatments:  1. Pt will increase FOTO score by 10 points to improve ability to perform ADLs. Eval: 32 points total  Current: Progressing: FOTO = 48.   2. Pt will report an improvement in at worst pain to /10 to improve ability to perform daily tasks. Eval: 10/10  Current: MET: 6/10.   3. Pt will increase PROM right shoulder flex to 140 degs, ABD to 90 degs to improve ability to bathe with less pain. Eval: flex 114 degs, ABD 46 degs  Current: progressing,Supine PROM right shoulder flex 135 degs,  degs. 4. Pt will be able to don/doff a shirt/sling with no difficulty/pain to improve independence with dressing. Eval: increased difficulty and pain with donning/doffing a shirt/sling  Current: Progressing: moderate difficulty with don/doffing shirts. Key Functional Changes: see goals above    Updated Goals: to be achieved in 4 weeks: 1. Pt will increase AROM right shoulder flex to 140 degs, ABD to 125 degs to improve ability to bathe with less pain. At PN: AROM flexion 0-114 deg, abduction 0-80 deg. 2. Patient will perform functionals reach of ER T1 and IR to L1 to increase ease with styling hair and donning belts. AT PN: functional reach ER = C5, IT right glut. 3.Pt will increase FOTO score by 10 points to improve ability to perform ADLs. Eval: 32 points total  Current: Progressing: FOTO = 48. 12/20/21  4. Pt will increase PROM right shoulder flex to 140 degs, ABD to 90 degs to improve ability to bathe with less pain. Eval: flex 114 degs, ABD 46 degs  Current: progressing,Supine PROM right shoulder flex 135 degs,  degs. 12/20/2021  5. Pt will be able to don/doff a shirt/sling with no difficulty/pain to improve independence with dressing. Eval: increased difficulty and pain with donning/doffing a shirt/sling  Current: Progressing: moderate difficulty with don/doffing shirts. 12/20/21     ASSESSMENT/RECOMMENDATIONS:  Patient is progressing well per post op protocol. Patient recently initiated AROM per protocol and has noticed decreased morning shoulder pain with sleeping without brace. Patient has recently been in contact with the doctor about incision having puss from it, therapist noticed no puss at today's session and no redness. Patient will continue to benefit from skilled PT services to modify and progress therapeutic interventions, address functional mobility deficits, address ROM deficits, address strength deficits, analyze and address soft tissue restrictions and analyze and cue movement patterns to attain remaining goals.     [x]Continue therapy per initial plan/protocol at a frequency of  2 x per week for 4 weeks  []Continue therapy with the following recommended changes:_____________________      _____________________________________________________________________  []Discontinue therapy progressing towards or have reached established goals  []Discontinue therapy due to lack of appreciable progress towards goals  []Discontinue therapy due to lack of attendance or compliance  []Await Physician's recommendations/decisions regarding therapy  []Other:________________________________________________________________    Thank you for this referral.    Cora Mccoy, PTA 12/20/2021 10:53 AM  NOTE TO PHYSICIAN:  PLEASE COMPLETE THE ORDERS BELOW AND   FAX TO [facility]: [phone]  If you are unable to process this request in 24 hours please contact our office: [phone]    []  I have read the above report and request that my patient continue as recommended. []  I have read the above report and request that my patient continue therapy with the following changes/special instructions:________________________________________  []I have read the above report and request that my patient be discharged from therapy.     [de-identified] Signature:____________Date:_________TIME:________     Luna Juarez,*  ** Signature, Date and Time must be completed for valid certification **

## 2021-12-20 NOTE — PROGRESS NOTES
PT DAILY TREATMENT NOTE     Patient Name: Marie Gay  Date:2021  : 1960  [x]  Patient  Verified  Payor: Apoorva Levin / Plan: [place] [employer] / Product Type: PPO /    In time:10:22  Out time:11:20  Total Treatment Time (min): 58  Visit #: 6 of 10      Treatment Area: Right shoulder pain [M25.511]    SUBJECTIVE   Pain Level (0-10 scale): 0  Any medication changes, allergies to medications, adverse drug reactions, diagnosis change, or new procedure performed?: [x] No    [] Yes (see summary sheet for update)  Subjective functional status/changes:   [] No changes reported  Patient reports she has been in contact with the doctor about puss coming front the incision of her shoulder. OBJECTIVE    Modality rationale: decrease pain to improve the patients ability to decrease difficulty while performing tasks. Min Type Additional Details   10 [x]  Ice     []  heat  []  Ice massage  []  Laser   []  Anodyne Position:sitting  Location:shoulder   [] Skin assessment post-treatment:  []intact []redness- no adverse reaction    []redness  adverse reaction:           26 min Therapeutic Exercise:  [x]? ?? See flow sheet :   Rationale: increase ROM, increase strength and improve coordination to improve the patients ability to increase ease with ADLS.        10 min Neuromuscular Re-education:  [x]  See flow sheet :scapular stability    Rationale: increase ROM, increase strength, improve coordination, improve balance and increase proprioception  to improve the patients ability to increase ease with overhead reach.        12 min Manual Therapy:    Supine- PROM right shoulder within protocol. Supine- right shoulder GHJ mobs grade I-II inferior and PA mobs.     The manual therapy interventions were performed at a separate and distinct time from the therapeutic activities interventions.   Rationale: decrease pain, increase ROM, increase tissue extensibility and decrease trigger points to increase ease with dressing.                     With   [] TE   [] TA   [] neuro   [] other: Patient Education: [x] Review HEP    [] Progressed/Changed HEP based on:   [] positioning   [] body mechanics   [] transfers   [] heat/ice application    [] other:      Other Objective/Functional Measures: see goals      Pain Level (0-10 scale) post treatment: 0    ASSESSMENT/Changes in Function: Patient is progressing well per post op protocol. Patient recently initiated AROM pro protocol and has noticed decreased in morning shoulder pain with sleeping without brace. Patient has recently been in contact with the doctor about incision having puss from it, therapist noticed no puss at today's session and no redness. Patient will continue to benefit from skilled PT services to modify and progress therapeutic interventions, address functional mobility deficits, address ROM deficits, address strength deficits, analyze and address soft tissue restrictions and analyze and cue movement patterns to attain remaining goals. []  See Plan of Care  [x]  See progress note/recertification  []  See Discharge Summary         Progress towards goals / Updated goals:  Short Term Goals: To be accomplished in 2 treatments:  1. Pt will report compliance and independence to HEP to help the pt manage their pain and symptoms.             Eval: established  Current: MET: pt reports performing HEP. 11/23/21   Long Term Goals: To be accomplished in 10 treatments:  1. Pt will increase FOTO score by 10 points to improve ability to perform ADLs. Eval: 32 points total  Current: Progressing: FOTO = 48. 12/20/21  2. Pt will report an improvement in at worst pain to 6/10 to improve ability to perform daily tasks. Eval: 10/10  Current: MET: 6/10. 12/20/21  3. Pt will increase PROM right shoulder flex to 140 degs, ABD to 90 degs to improve ability to bathe with less pain.   Eval: flex 114 degs, ABD 46 degs  Current: progressing,Supine PROM right shoulder flex 135 degs, ABD 105 degs. 12/20/2021  4. Pt will be able to don/doff a shirt/sling with no difficulty/pain to improve independence with dressing. Eval: increased difficulty and pain with donning/doffing a shirt/sling  Current: Progressing: moderate difficulty with don/doffing shirts. 12/20/21    Updated Goals  1. Pt will increase AROM right shoulder flex to 140 degs, ABD to 125 degs to improve ability to bathe with less pain. At PN: AROM flexion 0-114 deg, abduction 0-80 deg. 2. Patient will perform functionals reach of ER T1 and IR to L1 to increase ease with styling hair and donning belts.   AT PN: functional reach ER = C5, IT right glut.         PLAN  []  Upgrade activities as tolerated     [x]  Continue plan of care  []  Update interventions per flow sheet       []  Discharge due to:_  []  Other:_      Yamila Contreras PTA 12/20/2021  8:50 AM    Future Appointments   Date Time Provider Vernon Lawson   12/20/2021 10:15 AM Mc De Leon PTA [facility]   12/29/2021 11:00 AM Mc De Leon PTA [facility]   12/30/2021  1:15 PM Claire De Guzman PT [facility]   1/11/2022 10:45 AM Prudence Adkins MD [facility] AM[facility]   1/24/2022  1:10 PM Kathleen Conteh MD [facility] AM[facility]

## 2021-12-28 ENCOUNTER — DOCUMENTATION ONLY (OUTPATIENT)
Dept: ORTHOPEDIC SURGERY | Age: 61
End: 2021-12-28

## 2021-12-28 ENCOUNTER — TELEPHONE (OUTPATIENT)
Dept: ORTHOPEDIC SURGERY | Age: 61
End: 2021-12-28

## 2021-12-28 NOTE — TELEPHONE ENCOUNTER
[facility] is requesting a work note for this patient with projected honake-ky-ujub date or restrictions/ limitation preventing patient from returning to work. Please send this back when done so I can fax off with OVN's they are requesting.

## 2021-12-28 NOTE — LETTER
NOTIFICATION RETURN TO WORK / SCHOOL    12/28/2021 1:56 PM    Ms. Yoni Noble  [address] [facility] [address]      To Whom It May Concern:    Yoni Noble is currently under the care of [address] [place] [address] Eleazar Sood. Patient is status post right shoulder rotator cuff repair on November 1, 2021. She is expected to be out of work for approximately 10-12 weeks depending on recovery. Projected return to work date is  January 24, 2022. If there are questions or concerns please have the patient contact our office.         Sincerely,      Vona Pallas, MD

## 2021-12-29 ENCOUNTER — APPOINTMENT (OUTPATIENT)
Dept: PHYSICAL THERAPY | Age: 61
End: 2021-12-29
Attending: ORTHOPAEDIC SURGERY
Payer: COMMERCIAL

## 2021-12-30 ENCOUNTER — APPOINTMENT (OUTPATIENT)
Dept: PHYSICAL THERAPY | Age: 61
End: 2021-12-30
Attending: ORTHOPAEDIC SURGERY
Payer: COMMERCIAL

## 2022-01-04 ENCOUNTER — APPOINTMENT (OUTPATIENT)
Dept: PHYSICAL THERAPY | Age: 62
End: 2022-01-04
Attending: ORTHOPAEDIC SURGERY
Payer: COMMERCIAL

## 2022-01-06 ENCOUNTER — HOSPITAL ENCOUNTER (OUTPATIENT)
Dept: PHYSICAL THERAPY | Age: 62
Discharge: HOME | End: 2022-01-06
Attending: ORTHOPAEDIC SURGERY
Payer: COMMERCIAL

## 2022-01-06 PROCEDURE — 97140 MANUAL THERAPY 1/> REGIONS: CPT

## 2022-01-06 PROCEDURE — 97110 THERAPEUTIC EXERCISES: CPT

## 2022-01-06 PROCEDURE — 97112 NEUROMUSCULAR REEDUCATION: CPT

## 2022-01-06 NOTE — PROGRESS NOTES
PT DAILY TREATMENT NOTE     Patient Name: Nanci Dailey  Date:2022  : 1960  [x]  Patient  Verified  Payor: Ramón Augustine / Plan: [place] [employer] P[place] / Product Type: PPO /    In time:11:45  Out time:12:31  Total Treatment Time (min): 46  Visit #: 1 of 8      Treatment Area: Right shoulder pain [M25.511]    SUBJECTIVE  Pain Level (0-10 scale): 5  Any medication changes, allergies to medications, adverse drug reactions, diagnosis change, or new procedure performed?: [x] No    [] Yes (see summary sheet for update)  Subjective functional status/changes:   [] No changes reported  Patient reports she continues to have pain and soreness from her incision. OBJECTIVE         24 min Therapeutic Exercise:  [x]? ??? See flow sheet :   Rationale: increase ROM, increase strength and improve coordination to improve the patients ability to increase ease with ADLS.          10 min Neuromuscular Re-education:  [x]? See flow sheet :scapular stability    Rationale: increase ROM, increase strength, improve coordination, improve balance and increase proprioception  to improve the patients ability to increase ease with overhead reach.         12 min Manual Therapy:    Supine- PROM right shoulder within protocol. Supine- right shoulder GHJ mobs grade I-II inferior and PA mobs.     The manual therapy interventions were performed at a separate and distinct time from the therapeutic activities interventions. Rationale: decrease pain, increase ROM, increase tissue extensibility and decrease trigger points to increase ease with dressing.                  With   [] TE   [] TA   [] neuro   [] other: Patient Education: [x] Review HEP    [] Progressed/Changed HEP based on:   [] positioning   [] body mechanics   [] transfers   [] heat/ice application    [] other:      Other Objective/Functional Measures: AROM right shoulder flexion 0-130 deg, abduction 0-105 deg.       Pain Level (0-10 scale) post treatment: 6    ASSESSMENT/Changes in Function: Patient is progressing well with performing shoulder elevation with no increase in pain. Patient will continue to benefit from skilled PT services to modify and progress therapeutic interventions, address functional mobility deficits, address ROM deficits, address strength deficits and analyze and address soft tissue restrictions to attain remaining goals. []  See Plan of Care  []  See progress note/recertification  []  See Discharge Summary         Progress towards goals / Updated goals:  Updated Goals: to be achieved in 4 weeks: 1. Pt will increase AROM right shoulder flex to 140 degs, ABD to 125 degs to improve ability to bathe with less pain. At PN: AROM flexion 0-114 deg, abduction 0-80 deg. Current: Progressing: AROM right shoulder flexion 0-130 deg, abduction 0-105 deg. 1/6/22   2. Patient will perform functionals reach of ER T1 and IR to L1 to increase ease with styling hair and donning belts. AT PN: functional reach ER = C5, IT right glut.   3.Pt will increase FOTO score by 10 points to improve ability to perform ADLs. AT PN:  Progressing: FOTO = 48. 12/20/21  4. Pt will increase PROM right shoulder flex to 140 degs, ABD to 90 degs to improve ability to bathe with less pain. At PN:  progressing,Supine PROM right shoulder flex 135 degs, ABD 105 degs. 12/20/2021  5. Pt will be able to don/doff a shirt/sling with no difficulty/pain to improve independence with dressing. At PN:  Progressing: moderate difficulty with don/doffing shirts.  12/20/21     PLAN  []  Upgrade activities as tolerated     [x]  Continue plan of care  []  Update interventions per flow sheet       []  Discharge due to:_  []  Other:_      Jose Alejandro Hall PTA 1/6/2022  11:43 AM    Future Appointments   Date Time Provider Vernon Lawson   1/6/2022 11:45 AM [facility]   1/11/2022 10:45 AM Merlinda Rote, MD [facility] AMB   1/12/2022 12:30 PM [facility]   1/13/2022  9:30 AM Sabina Gonzalez, PTA [facility]   1/18/2022 10:15 AM Sabina Gonzalez, EMMANUEL [facility]   1/20/2022 10:15 AM Sabina Gonzalez PTA [facility]   1/24/2022  1:10 PM Sherrie Watkins MD [facility] BS AMB

## 2022-01-11 ENCOUNTER — OFFICE VISIT (OUTPATIENT)
Dept: FAMILY MEDICINE CLINIC | Age: 62
End: 2022-01-11
Payer: COMMERCIAL

## 2022-01-11 VITALS
WEIGHT: 169.2 LBS | HEART RATE: 80 BPM | BODY MASS INDEX: 29.97 KG/M2 | SYSTOLIC BLOOD PRESSURE: 123 MMHG | OXYGEN SATURATION: 97 % | TEMPERATURE: 97.5 F | RESPIRATION RATE: 18 BRPM | DIASTOLIC BLOOD PRESSURE: 79 MMHG

## 2022-01-11 DIAGNOSIS — E78.5 HYPERLIPIDEMIA, UNSPECIFIED HYPERLIPIDEMIA TYPE: ICD-10-CM

## 2022-01-11 DIAGNOSIS — B37.31 VAGINA, CANDIDIASIS: ICD-10-CM

## 2022-01-11 DIAGNOSIS — I10 ESSENTIAL HYPERTENSION: Primary | ICD-10-CM

## 2022-01-11 DIAGNOSIS — J06.9 VIRAL UPPER RESPIRATORY TRACT INFECTION: ICD-10-CM

## 2022-01-11 DIAGNOSIS — G47.00 INSOMNIA, UNSPECIFIED TYPE: ICD-10-CM

## 2022-01-11 DIAGNOSIS — T14.90XD HEALING WOUND: ICD-10-CM

## 2022-01-11 DIAGNOSIS — J30.9 ALLERGIC RHINITIS, UNSPECIFIED SEASONALITY, UNSPECIFIED TRIGGER: ICD-10-CM

## 2022-01-11 PROCEDURE — 99215 OFFICE O/P EST HI 40 MIN: CPT | Performed by: FAMILY MEDICINE

## 2022-01-11 RX ORDER — AZELASTINE 1 MG/ML
1 SPRAY, METERED NASAL 2 TIMES DAILY
Qty: 3 EACH | Refills: 4 | Status: SHIPPED | OUTPATIENT
Start: 2022-01-11 | End: 2022-08-15 | Stop reason: SDUPTHER

## 2022-01-11 RX ORDER — FLUCONAZOLE 150 MG/1
150 TABLET ORAL DAILY
Qty: 1 TABLET | Refills: 0 | Status: SHIPPED | OUTPATIENT
Start: 2022-01-11 | End: 2022-01-12

## 2022-01-11 RX ORDER — AMLODIPINE BESYLATE 10 MG/1
10 TABLET ORAL DAILY
Qty: 90 TABLET | Refills: 4 | Status: SHIPPED | OUTPATIENT
Start: 2022-01-11

## 2022-01-11 NOTE — PROGRESS NOTES
Chief Complaint   Patient presents with    Anxiety    Allergic Rhinitis    Hypertension    Wound Check      patient has rotator cuff repair with Dr. Polo Durán  saw him last 12/6 would like you to take a look at  surgical site.  Sinus Infection      patient complains of increased  drainiage says she frequently gets sinus infections .  Yeast Infection      patient complains of vaginal itching since yesterday . HPI    Lu Garcia is a 62 y.o. female presenting today for 3 months  follow up of htn, ar, anxiety. Pt had surgery with Dr. Marva Chavira. She reports that she is still in PT and does not yet have full ROM. Pt would like for me to check her wound today. There is a small amount of drainage. She still also has significant pain with the shoulder, especially upon awakening in the morning. Patient does need medication refills today. Labs not yet completed. New concerns today: pt reports that she has had increased nasal drainage recently. She is using zyrtec, astelin, and nasal irrigation. She has not had a covid test as she has not had any other sx. She is not working currently and does not have any known exposure to covid. Pt reports that she had onset of vaginal itching yesterday. There is no discharge yet. No changes in soaps, etc.     Insomnia is not well controlled with lunesta. Review of Systems   Constitutional: Negative. HENT: Positive for congestion. Negative for sinus pain. Respiratory: Negative. Cardiovascular: Negative. Genitourinary:        Vaginal itching   Skin:        Unhealed surgical wound   Psychiatric/Behavioral: The patient has insomnia. All other systems reviewed and are negative. Physical Exam  Vitals and nursing note reviewed. Constitutional:       Appearance: Normal appearance. She is not ill-appearing. HENT:      Head: Normocephalic and atraumatic.       Right Ear: Tympanic membrane, ear canal and external ear normal.      Left Ear: Tympanic membrane, ear canal and external ear normal.      Nose: Nose normal.      Right Turbinates: Enlarged. Left Turbinates: Enlarged. Right Sinus: No maxillary sinus tenderness or frontal sinus tenderness. Left Sinus: No maxillary sinus tenderness or frontal sinus tenderness. Mouth/Throat:      Mouth: Mucous membranes are moist.      Pharynx: No pharyngeal swelling, oropharyngeal exudate or posterior oropharyngeal erythema. Eyes:      Extraocular Movements: Extraocular movements intact. Conjunctiva/sclera: Conjunctivae normal.   Cardiovascular:      Rate and Rhythm: Normal rate and regular rhythm. Heart sounds: No murmur heard. No friction rub. No gallop. Pulmonary:      Effort: Pulmonary effort is normal.      Breath sounds: Normal breath sounds. No wheezing, rhonchi or rales. Musculoskeletal:         General: Normal range of motion. Cervical back: Normal range of motion. Skin:     General: Skin is warm and dry. Findings: Wound present. Neurological:      Mental Status: She is alert and oriented to person, place, and time. Coordination: Coordination normal.   Psychiatric:         Mood and Affect: Mood normal.         Behavior: Behavior normal.         Thought Content: Thought content normal.         Judgment: Judgment normal.         Diagnoses and all orders for this visit:    1. Essential hypertension  -     amLODIPine (NORVASC) 10 mg tablet; Take 1 Tablet by mouth daily. Stable, cont pres tx plan. 2. Hyperlipidemia, unspecified hyperlipidemia type  Stable, cont pres tx plan. 3. Insomnia, unspecified type  -     SLEEP MEDICINE REFERRAL    4. Allergic rhinitis, unspecified seasonality, unspecified trigger  -     azelastine (ASTELIN) 137 mcg (0.1 %) nasal spray; 1 [place] by Both Nostrils route two (2) times a day. Use in each nostril as directed    5.  Vagina, candidiasis  -     fluconazole (DIFLUCAN) 150 mg tablet; Take 1 Tablet by mouth daily for 1 day. 6. Healing wound  Pt reassured. Cont care as per ortho. Apply white petrolatum after cleaning. 7. Viral upper respiratory tract infection  Consider covid testing. Otherwise, recommend symptomatic tx. Follow-up and Dispositions    · Return in about 3 months (around 4/11/2022) for high blood pressure, high cholesterol, insomnia, allergic rhinitis.

## 2022-01-11 NOTE — PROGRESS NOTES
Ambar Guillen presents today for   Chief Complaint   Patient presents with    Anxiety    Allergic Rhinitis    Hypertension    Wound Check      patient has rotator cuff repair with Dr. Ortiz Ringer  saw him last 12/6 would like you to take a look at  surgical site.  Sinus Infection      patient complains of increased  drainiage says she frequently gets sinus infections .  Yeast Infection      patient complains of vaginal itching since yesterday . Ambar Guillen preferred language for health care discussion is english/other. Is someone accompanying this pt? No     Is the patient using any DME equipment during OV? No     Depression Screening:  3 most recent PHQ Screens 11/8/2021   Little interest or pleasure in doing things Not at all   Feeling down, depressed, irritable, or hopeless Not at all   Total Score PHQ 2 0       Learning Assessment:  Learning Assessment 2/19/2021   PRIMARY LEARNER Patient   HIGHEST LEVEL OF EDUCATION - PRIMARY LEARNER  SOME COLLEGE   BARRIERS PRIMARY LEARNER NONE   CO-LEARNER CAREGIVER No   PRIMARY LANGUAGE ENGLISH   LEARNER PREFERENCE PRIMARY READING     -     -     -     -   ANSWERED BY Patient   RELATIONSHIP SELF       Abuse Screening:  Abuse Screening Questionnaire 9/10/2021   Do you ever feel afraid of your partner? N   Are you in a relationship with someone who physically or mentally threatens you? N   Is it safe for you to go home? Y       Generalized Anxiety  MILLA 2/7 4/14/2021   Feeling nervous, anxious or on edge? 1   Not being able to stop or control worrying? 0   MILLA-2 Subtotal 1         Health Maintenance Due   Topic Date Due    DTaP/Tdap/Td series (1 - Tdap) Never done    Shingrix Vaccine Age 50> (1 of 2) Never done    COVID-19 Vaccine (3 - Booster for Pfizer series) 08/17/2021    Flu Vaccine (1) 09/01/2021   . Health Maintenance reviewed and discussed and ordered per Provider. Ambar Guillen is updated on all HM    1.  \"Have you been to the Problem: Ineffective Coping  Goal: Participates in unit activities  Description  Interventions:  - Provide therapeutic environment   - Provide required programming   - Redirect inappropriate behaviors   Outcome: Progressing ER, urgent care clinic since your last visit? Hospitalized since your last visit? \"  No     2. \"Have you seen or consulted any other health care providers outside of the [address] [place] [address] since your last visit? \" no     3. For patients aged 39-70: Has the patient had a colonoscopy / FIT/ Cologuard? No   If the patient is female:    4. For patients aged 41-77: Has the patient had a mammogram within the past 2 years? No     5. For patients aged 21-65: Has the patient had a pap smear?  No

## 2022-01-12 ENCOUNTER — HOSPITAL ENCOUNTER (OUTPATIENT)
Dept: PHYSICAL THERAPY | Age: 62
Discharge: HOME | End: 2022-01-12
Attending: ORTHOPAEDIC SURGERY
Payer: COMMERCIAL

## 2022-01-12 PROCEDURE — 97110 THERAPEUTIC EXERCISES: CPT

## 2022-01-12 PROCEDURE — 97112 NEUROMUSCULAR REEDUCATION: CPT

## 2022-01-12 PROCEDURE — 97140 MANUAL THERAPY 1/> REGIONS: CPT

## 2022-01-12 NOTE — PROGRESS NOTES
PT DAILY TREATMENT NOTE     Patient Name: Александр Crow  Date:2022  : 1960  [x]  Patient  Verified  Payor: Rashel Croft / Plan: [place] [employer] PPO / Product Type: PPO /    In time:12:31  Out time:1:17  Total Treatment Time (min):46  Visit #: 2 of 8      Treatment Area: Right shoulder pain [M25.511]    SUBJECTIVE  Pain Level (0-10 scale): 3  Any medication changes, allergies to medications, adverse drug reactions, diagnosis change, or new procedure performed?: [x] No    [] Yes (see summary sheet for update)  Subjective functional status/changes:   [] No changes reported  Patient reports being consistent with performing HEP. OBJECTIVE          26 min Therapeutic Exercise:  [x]? ???? See flow sheet :   Rationale: increase ROM, increase strength and improve coordination to improve the patients ability to increase ease with ADLS.          10 min Neuromuscular Re-education:  [x]? ?  See flow sheet :scapular stability    Rationale: increase ROM, increase strength, improve coordination, improve balance and increase proprioception  to improve the patients ability to increase ease with overhead reach.         10 min Manual Therapy:    Supine- PROM right shoulder within protocol. Supine- right shoulder GHJ mobs grade I-II inferior and PA mobs.      The manual therapy interventions were performed at a separate and distinct time from the therapeutic activities interventions. Rationale: decrease pain, increase ROM, increase tissue extensibility and decrease trigger points to increase ease with dressing.                        With   [] TE   [] TA   [] neuro   [] other: Patient Education: [x] Review HEP    [] Progressed/Changed HEP based on:   [] positioning   [] body mechanics   [] transfers   [] heat/ice application    [] other:      Other Objective/Functional Measures: PROM right shoulder flexion 0-150 deg, abduction 0-124 deg.      Pain Level (0-10 scale) post treatment: 7    ASSESSMENT/Changes in Function: Patient is improving well with right shoulder mobility, but continues to lack strength. Patient will continue to benefit from skilled PT services to modify and progress therapeutic interventions, address functional mobility deficits, address ROM deficits, address strength deficits, analyze and address soft tissue restrictions and analyze and cue movement patterns to attain remaining goals. []  See Plan of Care  []  See progress note/recertification  []  See Discharge Summary         Progress towards goals / Updated goals:  Updated Goals: to be achieved in 4 weeks: 1. Pt will increase AROM right shoulder flex to 140 degs, ABD to 125 degs to improve ability to bathe with less pain. At PN: AROM flexion 0-114 deg, abduction 0-80 deg. Current: Progressing: AROM right shoulder flexion 0-130 deg, abduction 0-105 deg. 1/6/22   2. Patient will perform functionals reach of ER T1 and IR to L1 to increase ease with styling hair and donning belts. AT PN: functional reach ER = C5, IT right glut.   3.Pt will increase FOTO score by 10 points to improve ability to perform ADLs. AT PN:  Progressing: FOTO = 48. 12/20/21  4. Pt will increase PROM right shoulder flex to 140 degs, ABD to 90 degs to improve ability to bathe with less pain. At PN:  progressing,Supine PROM right shoulder flex 135 degs, ABD 105 degs. 12/20/2021  Current: MET: PROM right shoulder flexion 0-150 deg, abduction 0-124 deg. 1/12/22  5. Pt will be able to don/doff a shirt/sling with no difficulty/pain to improve independence with dressing. At PN:  Progressing: moderate difficulty with don/doffing shirts.  12/20/21        PLAN  []  Upgrade activities as tolerated     [x]  Continue plan of care  []  Update interventions per flow sheet       []  Discharge due to:_  []  Other:_      Effie Nino PTA 1/12/2022  12:36 PM    Future Appointments   Date Time Provider Vernon Lawson   1/13/2022  9:30 AM Kam Knutson PTA [facility]   1/18/2022 10:15 AM Reuben Xiong PTA [facility]   1/20/2022 10:15 AM Reuben Xiong PTA [facility]   1/24/2022  1:10 PM Christie Rios MD [facility] BS AMB

## 2022-01-13 ENCOUNTER — HOSPITAL ENCOUNTER (OUTPATIENT)
Dept: PHYSICAL THERAPY | Age: 62
Discharge: HOME | End: 2022-01-13
Attending: ORTHOPAEDIC SURGERY
Payer: COMMERCIAL

## 2022-01-13 PROCEDURE — 97110 THERAPEUTIC EXERCISES: CPT

## 2022-01-13 PROCEDURE — 97140 MANUAL THERAPY 1/> REGIONS: CPT

## 2022-01-13 PROCEDURE — 97112 NEUROMUSCULAR REEDUCATION: CPT

## 2022-01-13 NOTE — PROGRESS NOTES
PT DAILY TREATMENT NOTE     Patient Name: Kashmir Raygoza  Date:2022  : 1960  [x]  Patient  Verified  Payor: Jeramie Can / Plan: [place] [employer] / Product Type: PPO /    In time:9:33  Out time:10:17  Total Treatment Time (min): 44  Visit #: 3 of 8    Treatment Area: Right shoulder pain [M25.511]    SUBJECTIVE  Pain Level (0-10 scale): 0  Any medication changes, allergies to medications, adverse drug reactions, diagnosis change, or new procedure performed?: [x] No    [] Yes (see summary sheet for update)  Subjective functional status/changes:   [] No changes reported  Patient reports she has been feeling well and has not had any pain this morning. OBJECTIVE       24 min Therapeutic Exercise:  [x]?????? See flow sheet :   Rationale: increase ROM, increase strength and improve coordination to improve the patients ability to increase ease with ADLS.          10 min Neuromuscular Re-education:  [x]? ??  See flow sheet :scapular stability    Rationale: increase ROM, increase strength, improve coordination, improve balance and increase proprioception  to improve the patients ability to increase ease with overhead reach.         10 min Manual Therapy:    Supine- PROM right shoulder within protocol. Supine- right shoulder GHJ mobs grade I-II inferior and PA mobs.      The manual therapy interventions were performed at a separate and distinct time from the therapeutic activities interventions. Rationale: decrease pain, increase ROM, increase tissue extensibility and decrease trigger points to increase ease with dressing.                           With   [] TE   [] TA   [] neuro   [] other: Patient Education: [x] Review HEP    [] Progressed/Changed HEP based on:   [] positioning   [] body mechanics   [] transfers   [] heat/ice application    [] other:      Other Objective/Functional Measures: patient continues to be most limited with PROM shoulder IR.       Pain Level (0-10 scale) post treatment:0    ASSESSMENT/Changes in Function: Patient is tolerating increase in resistance well. Patient will continue to benefit from skilled PT services to modify and progress therapeutic interventions, address functional mobility deficits, address ROM deficits, address strength deficits, analyze and address soft tissue restrictions, analyze and cue movement patterns and analyze and modify body mechanics/ergonomics to attain remaining goals. []  See Plan of Care  []  See progress note/recertification  []  See Discharge Summary         Progress towards goals / Updated goals:  Updated Goals: to be achieved in 4 weeks: 1. Pt will increase AROM right shoulder flex to 140 degs, ABD to 125 degs to improve ability to bathe with less pain. At PN: AROM flexion 0-114 deg, abduction 0-80 deg. Current: Progressing: AROM right shoulder flexion 0-130 deg, abduction 0-105 deg. 1/6/22   2. Patient will perform functionals reach of ER T1 and IR to L1 to increase ease with styling hair and donning belts. AT PN: functional reach ER = C5, IT right glut.   3.Pt will increase FOTO score by 10 points to improve ability to perform ADLs. AT PN:  Progressing: FOTO = 48. 12/20/21  4. Pt will increase PROM right shoulder flex to 140 degs, ABD to 90 degs to improve ability to bathe with less pain. At PN:  progressing,Supine PROM right shoulder flex 135 degs, ABD 105 degs. 12/20/2021  Current: MET: PROM right shoulder flexion 0-150 deg, abduction 0-124 deg. 1/12/22  5. Pt will be able to don/doff a shirt/sling with no difficulty/pain to improve independence with dressing.   At PN:  Progressing: moderate difficulty with don/doffing shirts.  12/20/21           PLAN  []  Upgrade activities as tolerated     [x]  Continue plan of care  []  Update interventions per flow sheet       []  Discharge due to:_  []  Other:_      Radha Miles PTA 1/13/2022  8:44 AM    Future Appointments   Date Time Provider Vernon Lawson   1/13/2022 9:30 AM Lj Ho [facility]   1/18/2022 10:15 AM Millicent Navarrete PTA [facility]   1/20/2022 10:15 AM Millicent Navarrete PTA [facility]   1/24/2022  1:10 PM Unique Garcia MD [facility] Patient and ED Staff Patient, Patient's , Neuro and ED Staff

## 2022-01-18 ENCOUNTER — HOSPITAL ENCOUNTER (OUTPATIENT)
Dept: PHYSICAL THERAPY | Age: 62
Discharge: HOME | End: 2022-01-18
Attending: ORTHOPAEDIC SURGERY
Payer: COMMERCIAL

## 2022-01-18 PROCEDURE — 97112 NEUROMUSCULAR REEDUCATION: CPT

## 2022-01-18 PROCEDURE — 97140 MANUAL THERAPY 1/> REGIONS: CPT

## 2022-01-18 PROCEDURE — 97110 THERAPEUTIC EXERCISES: CPT

## 2022-01-18 NOTE — PROGRESS NOTES
PT DAILY TREATMENT NOTE     Patient Name: Александр Crow  Date:2022  : 1960  [x]  Patient  Verified  Payor: Rashel Croft / Plan: [place] [employer] [place] / Product Type: PPO /    In time:10:19  Out time:11:02  Total Treatment Time (min): 43  Visit #: 4 of 8    Treatment Area: Right shoulder pain [M25.511]    SUBJECTIVE  Pain Level (0-10 scale): 0  Any medication changes, allergies to medications, adverse drug reactions, diagnosis change, or new procedure performed?: [x] No    [] Yes (see summary sheet for update)  Subjective functional status/changes:   [] No changes reported  Patient reports that she continues to have difficulty with reaching overhead and behind her back. OBJECTIVE        23 min Therapeutic Exercise:  [x]??????? See flow sheet :   Rationale: increase ROM, increase strength and improve coordination to improve the patients ability to increase ease with ADLS.          10 min Neuromuscular Re-education:  [x]? ???  See flow sheet :scapular stability    Rationale: increase ROM, increase strength, improve coordination, improve balance and increase proprioception  to improve the patients ability to increase ease with overhead reach.         10 min Manual Therapy:    Supine- PROM right shoulder within protocol. Supine- right shoulder GHJ mobs grade I-II inferior and PA mobs.      The manual therapy interventions were performed at a separate and distinct time from the therapeutic activities interventions.   Rationale: decrease pain, increase ROM, increase tissue extensibility and decrease trigger points to increase ease with dressing.                     With   [] TE   [] TA   [] neuro   [] other: Patient Education: [x] Review HEP    [] Progressed/Changed HEP based on:   [] positioning   [] body mechanics   [] transfers   [] heat/ice application    [] other:      Other Objective/Functional Measures: see goals      Pain Level (0-10 scale) post treatment: 5    ASSESSMENT/Changes in Function: Patient has progressed well toward LTGs and reports 50% in overall improvement. Patient continues to lack strength at end range and has limitations with overhead reaching. Patient continues to be limited with functional IR reach with limits her with donning her coat. Patient will continue to benefit from skilled PT services to modify and progress therapeutic interventions, address functional mobility deficits, address ROM deficits, address strength deficits, analyze and address soft tissue restrictions and analyze and cue movement patterns to attain remaining goals. []  See Plan of Care  [x]  See progress note/recertification  []  See Discharge Summary         Progress towards goals / Updated goals:  Updated Goals: to be achieved in 4 weeks: 1. Pt will increase AROM right shoulder flex to 140 degs, ABD to 125 degs to improve ability to bathe with less pain. At PN: AROM flexion 0-114 deg, abduction 0-80 deg. Current: Progressing: AROM right shoulder flexion 0-135 deg, abduction 0-115 deg. 1/18/22   2. Patient will perform functionals reach of ER T1 and IR to L1 to increase ease with styling hair and donning belts. AT PN: functional reach ER = C5, IT right glut. Current: Progressing: functional reach ER = T1, IR = right glut 1/18/22   3. Pt will increase FOTO score 64 points to improve ability to perform ADLs. AT PN:  Progressing: FOTO = 48. 12/20/21  Current: Progressing: FOTO = 50. 1/18/22  4. Pt will increase PROM right shoulder flex to 140 degs, ABD to 90 degs to improve ability to bathe with less pain. At PN:  progressing,Supine PROM right shoulder flex 135 degs, ABD 105 degs. 12/20/2021  Current: MET: PROM right shoulder flexion 0-150 deg, abduction 0-124 deg. 1/12/22  5. Pt will be able to don/doff a shirt/sling with no difficulty/pain to improve independence with dressing.   At PN:  Progressing: moderate difficulty with don/doffing shirts.  12/20/21  Current: Progressing: little difficulty with donning/doffing shirt.  1/18/22     PLAN  []  Upgrade activities as tolerated     [x]  Continue plan of care  []  Update interventions per flow sheet       []  Discharge due to:_  []  Other:_      Delonte Ace PTA 1/18/2022  10:20 AM    Future Appointments   Date Time Provider Vernon Lawson   1/20/2022 10:15 AM Reji Cleveland PTA [facility]   1/24/2022  1:10 PM Becky Contreras MD [facility]

## 2022-01-18 NOTE — PROGRESS NOTES
[facility] - [facility]              [address]        [place], [address] [place]   [phone] fax    Progress Note  Patient name: Radha Russell Start of Care: 2021   Referral source: Mindy Knight,* : 1960   Medical/Treatment Diagnosis: Right shoulder pain [M25.511]  Payor: Abhi Oliveira / Plan: [place] [employer] PPO / Product Type: PPO /  Onset Date:  DoS 2021     Prior Hospitalization: see medical history Provider#: [id number]   Medications: Verified on Patient Summary List    Comorbidities: HTN   Prior Level of Function: Independent with ADLs, functional, and work tasks with pain in the right shoulder. Visits from Start of Care: 10    Missed Visits: 5    Established Goals: 1. Pt will increase AROM right shoulder flex to 140 degs, ABD to 125 degs to improve ability to bathe with less pain. At PN: AROM flexion 0-114 deg, abduction 0-80 deg. Current: Progressing: AROM right shoulder flexion 0-135 deg, abduction 0-115 deg. 2. Patient will perform functionals reach of ER T1 and IR to L1 to increase ease with styling hair and donning belts. AT PN: functional reach ER = C5, IT right glut. Current: Progressing: functional reach ER = T1, IR = right glut   3. Pt will increase FOTO score 64 points to improve ability to perform ADLs. AT PN:  Progressing: FOTO = 48. Current: Progressing: FOTO = 50.   4. Pt will increase PROM right shoulder flex to 140 degs, ABD to 90 degs to improve ability to bathe with less pain. At PN:  progressing,Supine PROM right shoulder flex 135 degs,  degs. Current: MET: PROM right shoulder flexion 0-150 deg, abduction 0-124 deg. 5. Pt will be able to don/doff a shirt/sling with no difficulty/pain to improve independence with dressing. At PN:  Progressing: moderate difficulty with don/doffing shirts. Current: Progressing: little difficulty with donning/doffing shirt.      Key Functional Changes: see goals above    Updated Goals: to be achieved in 4 weeks: 1. Pt will increase AROM right shoulder flex to 140 degs, ABD to 125 degs to improve ability to bathe with less pain. At PN:  Progressing: AROM right shoulder flexion 0-135 deg, abduction 0-115 deg. 2. Patient will perform functionals reach of ER T1 and IR to L1 to increase ease with styling hair and donning belts. AT PN: Progressing: functional reach ER = T1, IR = right glut   3. Pt will increase FOTO score 64 points to improve ability to perform ADLs. AT PN: Progressing: FOTO = 50.   4. Pt will be able to don/doff a shirt/sling with no difficulty/pain to improve independence with dressing. At PN:  : Progressing: little difficulty with donning/doffing shirt. ASSESSMENT/RECOMMENDATIONS:  Patient has progressed well toward LTGs and reports 50% in overall improvement. Patient continues to lack strength at end range and has limitations with overhead reaching. Patient continues to be limited with functional IR reach affecting her ability with donning her coat. Patient will continue to benefit from skilled PT services to modify and progress therapeutic interventions, address functional mobility deficits, address ROM deficits, address strength deficits, analyze and address soft tissue restrictions and analyze and cue movement patterns to attain remaining goals.     [x]Continue therapy per initial plan/protocol at a frequency of  2 x per week for 4 weeks  []Continue therapy with the following recommended changes:_____________________      _____________________________________________________________________  []Discontinue therapy progressing towards or have reached established goals  []Discontinue therapy due to lack of appreciable progress towards goals  []Discontinue therapy due to lack of attendance or compliance  []Await Physician's recommendations/decisions regarding therapy  []Other:________________________________________________________________    Thank you for this referral.    Vj Connor, PTA 1/18/2022 10:22 AM  NOTE TO PHYSICIAN:  PLEASE COMPLETE THE ORDERS BELOW AND   FAX TO [facility]: [phone]  If you are unable to process this request in 24 hours please contact our office: [phone]    []  I have read the above report and request that my patient continue as recommended. []  I have read the above report and request that my patient continue therapy with the following changes/special instructions:________________________________________  []I have read the above report and request that my patient be discharged from therapy.     [address] [facility] Signature:____________Date:_________TIME:________     Yury Cruz,*  ** Signature, Date and Time must be completed for valid certification **

## 2022-01-20 ENCOUNTER — HOSPITAL ENCOUNTER (OUTPATIENT)
Dept: PHYSICAL THERAPY | Age: 62
Discharge: HOME | End: 2022-01-20
Attending: ORTHOPAEDIC SURGERY
Payer: COMMERCIAL

## 2022-01-20 PROCEDURE — 97140 MANUAL THERAPY 1/> REGIONS: CPT

## 2022-01-20 PROCEDURE — 97110 THERAPEUTIC EXERCISES: CPT

## 2022-01-20 PROCEDURE — 97112 NEUROMUSCULAR REEDUCATION: CPT

## 2022-01-20 NOTE — PROGRESS NOTES
PT DAILY TREATMENT NOTE     Patient Name: Herschell Leventhal  Date:2022  : 1960  [x]  Patient  Verified  Payor: Joseph [employer] / Plan: [place] [employer] PPO / Product Type: PPO /    In time:10:10  Out time:11:02  Total Treatment Time (min): 52  Visit #: 1 of 8      Treatment Area: Right shoulder pain [M25.511]    SUBJECTIVE  Pain Level (0-10 scale): 7  Any medication changes, allergies to medications, adverse drug reactions, diagnosis change, or new procedure performed?: [x] No    [] Yes (see summary sheet for update)  Subjective functional status/changes:   [] No changes reported  Patient reports she was very sore from increased weight added to exercises last session. OBJECTIVE    Modality rationale: decrease pain to improve the patients ability to decrease difficulty while performing tasks. Min Type Additional Details   10 [x]  Ice     []  heat  []  Ice massage  []  Laser   []  Anodyne Position:sitting  Location:shoulder   [] Skin assessment post-treatment:  []intact []redness- no adverse reaction    []redness  adverse reaction:           23 min Therapeutic Exercise:  [x]???????? See flow sheet :   Rationale: increase ROM, increase strength and improve coordination to improve the patients ability to increase ease with ADLS.          10 min Neuromuscular Re-education:  [x]? ????  See flow sheet :scapular stability    Rationale: increase ROM, increase strength, improve coordination, improve balance and increase proprioception  to improve the patients ability to increase ease with overhead reach.         9 min Manual Therapy:    Supine- PROM right shoulder within protocol. Supine- right shoulder GHJ mobs grade I-II inferior and PA mobs.      The manual therapy interventions were performed at a separate and distinct time from the therapeutic activities interventions.   Rationale: decrease pain, increase ROM, increase tissue extensibility and decrease trigger points to increase ease with dressing.                 With   [] TE   [] TA   [] neuro   [] other: Patient Education: [x] Review HEP    [] Progressed/Changed HEP based on:   [] positioning   [] body mechanics   [] transfers   [] heat/ice application    [] other:      Other Objective/Functional Measures: PROM right shoulder flexion WNL. Pain Level (0-10 scale) post treatment: 4    ASSESSMENT/Changes in Function: Held on progression of exercises due to increase in pain/soreness after last session. Patient will continue to benefit from skilled PT services to modify and progress therapeutic interventions, address functional mobility deficits, address ROM deficits, address strength deficits and analyze and address soft tissue restrictions to attain remaining goals. []  See Plan of Care  []  See progress note/recertification  []  See Discharge Summary         Progress towards goals / Updated goals:  Updated Goals: to be achieved in 4 weeks: 1. Pt will increase AROM right shoulder flex to 140 degs, ABD to 125 degs to improve ability to bathe with less pain. At PN:  Progressing: AROM right shoulder flexion 0-135 deg, abduction 0-115 deg. 2. Patient will perform functionals reach of ER T1 and IR to L1 to increase ease with styling hair and donning belts. AT PN: Progressing: functional reach ER = T1, IR = right glut   3. Pt will increase FOTO score 64 points to improve ability to perform ADLs.   AT PN: Progressing: Mat Ratel = 50.   4. Pt will be able to don/doff a shirt/sling with no difficulty/pain to improve independence with dressing.   At PN:  : Progressing: little difficulty with donning/doffing shirt.        PLAN  []  Upgrade activities as tolerated     [x]  Continue plan of care  []  Update interventions per flow sheet       []  Discharge due to:_  []  Other:_      Ada Aburto, EMMANUEL 1/20/2022  10:28 AM    Future Appointments   Date Time Provider Vernon Lulu   1/24/2022  1:10 PM Unique Garcia MD [facility]

## 2022-01-24 ENCOUNTER — OFFICE VISIT (OUTPATIENT)
Dept: ORTHOPEDIC SURGERY | Age: 62
End: 2022-01-24
Payer: COMMERCIAL

## 2022-01-24 VITALS — WEIGHT: 171 LBS | TEMPERATURE: 96.9 F | HEIGHT: 63 IN | BODY MASS INDEX: 30.3 KG/M2

## 2022-01-24 DIAGNOSIS — M75.121 COMPLETE TEAR OF RIGHT ROTATOR CUFF, UNSPECIFIED WHETHER TRAUMATIC: Primary | ICD-10-CM

## 2022-01-24 PROCEDURE — 99024 POSTOP FOLLOW-UP VISIT: CPT | Performed by: ORTHOPAEDIC SURGERY

## 2022-01-24 NOTE — PROGRESS NOTES
Jordana Hoffman  1960     HISTORY OF PRESENT ILLNESS  Jordana Hoffman is a 62 y.o. female who presents today for evaluation s/p Right shoulder arthroscopic rotator cuff repair on 11/01/2021. Patient has been going to PT. Describes pain as a 4/10. She has been going to PT and is overall improving. She notes pain with reaching behind her back and overhead reaching. Patient denies any fever, chills, chest pain, shortness of breath or calf pain. There are no new illness or injuries to report since last seen in the office. PHYSICAL EXAM:   Visit Vitals  Temp 96.9 °F (36.1 °C) (Temporal)   Ht 5' 3\" (1.6 m)   Wt 171 lb (77.6 kg)   LMP 12/31/1996   BMI 30.29 kg/m²      The patient is a well-developed, well-nourished female in no acute distress. The patient is alert and oriented times three. The patient appears to be well groomed. Mood and affect are normal.  ORTHOPEDIC EXAM of Right shoulder:  Inspection: swelling none  Incision, clean, dry, intact, sutures in place  Passive glenohumeral abduction 0- 120 degrees  Stability: Stable  Strength: n/a  2+ distal pulses      IMPRESSION:   S/p Right shoulder arthroscopic rotator cuff repair       PLAN: Pt presents  s/p Right shoulder arthroscopic rotator cuff repair on 11/01/2021. Continue with PT and modify activities. Provided note to remain on no duty for the next 6 weeks. RTC 6 weeks      Scribed by Kwan Brochure [address]) as dictated by Priscila Thakkar MD    I, Dr. Priscila Thakkar, confirm that all documentation is accurate.     Priscila Thakkar M.D.   [facility]

## 2022-01-25 ENCOUNTER — HOSPITAL ENCOUNTER (OUTPATIENT)
Dept: PHYSICAL THERAPY | Age: 62
Discharge: HOME | End: 2022-01-25
Attending: ORTHOPAEDIC SURGERY
Payer: COMMERCIAL

## 2022-01-25 PROCEDURE — 97140 MANUAL THERAPY 1/> REGIONS: CPT | Performed by: PHYSICAL THERAPIST

## 2022-01-25 PROCEDURE — 97112 NEUROMUSCULAR REEDUCATION: CPT | Performed by: PHYSICAL THERAPIST

## 2022-01-25 PROCEDURE — 97110 THERAPEUTIC EXERCISES: CPT | Performed by: PHYSICAL THERAPIST

## 2022-01-25 NOTE — PROGRESS NOTES
PT DAILY TREATMENT NOTE     Patient Name: Ambar Guillen  Date:2022  : 1960  [x]  Patient  Verified  Payor: Luke Burden / Plan: [place] [employer] PPO / Product Type: PPO /    In time:2:45  Out time:3:28  Total Treatment Time (min): 43  Visit #: 2 of 8    Treatment Area: Right shoulder pain [M25.511]    SUBJECTIVE  Pain Level (0-10 scale): 0  Any medication changes, allergies to medications, adverse drug reactions, diagnosis change, or new procedure performed?: [x] No    [] Yes (see summary sheet for update)  Subjective functional status/changes:   [] No changes reported  Patient notes intermittent soreness in the right shoulder. Notes sitting in a wing back chair is uncomfortable. Overall, she notes she is doing better. OBJECTIVE      23 min Therapeutic Exercise:  [x]????????? See flow sheet :   Rationale: increase ROM, increase strength and improve coordination to improve the patients ability to increase ease with ADLS.     10 min Neuromuscular Re-education:  [x]??????  See flow sheet :scapular stability    Rationale: increase ROM, increase strength, improve coordination, improve balance and increase proprioception  to improve the patients ability to increase ease with overhead reach.       10 min Manual Therapy:    Supine- PROM right shoulder within protocol. Supine- right shoulder GHJ mobs grade I-II inferior and PA mobs.      The manual therapy interventions were performed at a separate and distinct time from the therapeutic activities interventions. Rationale: decrease pain, increase ROM, increase tissue extensibility and decrease trigger points to increase ease with dressing          With   [] TE   [] TA   [] neuro   [] other: Patient Education: [x] Review HEP    [] Progressed/Changed HEP based on:   [] positioning   [] body mechanics   [] transfers   [] heat/ice application    [] other:      Other Objective/Functional Measures: AROM right shoulder flexion 0-135; abduction 0-115.      Pain Level (0-10 scale) post treatment: 2-3. ASSESSMENT/Changes in Function: Patient with subjective improvement in her function. Notes decreased c/o pain. Patient will continue to benefit from skilled PT services to modify and progress therapeutic interventions, address functional mobility deficits, address ROM deficits, address strength deficits and analyze and address soft tissue restrictions to attain remaining goals. [x]  See Plan of Care  []  See progress note/recertification  []  See Discharge Summary         Progress towards goals / Updated goals: 1. Pt will increase AROM right shoulder flex to 140 degs, ABD to 125 degs to improve ability to bathe with less pain. At PN:  Progressing: AROM right shoulder flexion 0-135 deg, abduction 0-115 deg. Current:  AROM right shoulder flexion 0-135; abd 0-115.  1/25/2022  2. Patient will perform functionals reach of ER T1 and IR to L1 to increase ease with styling hair and donning belts. AT PN: Progressing: functional reach ER = T1, IR = right glut   3. Pt will increase FOTO score 64 points to improve ability to perform ADLs.   AT PN: Progressing: FOTO = 50.   4. Pt will be able to don/doff a shirt/sling with no difficulty/pain to improve independence with dressing.   At PN:  : Progressing: little difficulty with donning/doffing shirt.     PLAN  [x]  Upgrade activities as tolerated     [x]  Continue plan of care  []  Update interventions per flow sheet       []  Discharge due to:_  []  Other:_      Tommi Lanes, PT 1/25/2022  2:50 PM    Future Appointments   Date Time Provider Vernon Lawson   1/27/2022  2:00 PM Solomon Mayo, PT [facility]   2/1/2022 10:15 AM Helena Morfin PT [facility]   2/3/2022  2:45 PM Ascencion Chávez PTA [facility]   2/8/2022 11:00 AM Ascencion Chávez PTA [facility]   2/10/2022 10:15 AM Ascencion Chávez PTA [facility]

## 2022-01-26 ENCOUNTER — DOCUMENTATION ONLY (OUTPATIENT)
Dept: ORTHOPEDIC SURGERY | Age: 62
End: 2022-01-26

## 2022-01-26 NOTE — PROGRESS NOTES
[facility] faxed over Short Term Disability Claim Form to [facility].

## 2022-01-27 ENCOUNTER — APPOINTMENT (OUTPATIENT)
Dept: PHYSICAL THERAPY | Age: 62
End: 2022-01-27
Attending: ORTHOPAEDIC SURGERY
Payer: COMMERCIAL

## 2022-02-01 ENCOUNTER — HOSPITAL ENCOUNTER (OUTPATIENT)
Dept: PHYSICAL THERAPY | Age: 62
Discharge: HOME | End: 2022-02-01
Attending: ORTHOPAEDIC SURGERY
Payer: COMMERCIAL

## 2022-02-01 PROCEDURE — 97112 NEUROMUSCULAR REEDUCATION: CPT

## 2022-02-01 PROCEDURE — 97140 MANUAL THERAPY 1/> REGIONS: CPT

## 2022-02-01 PROCEDURE — 97110 THERAPEUTIC EXERCISES: CPT

## 2022-02-01 NOTE — PROGRESS NOTES
PT DAILY TREATMENT NOTE     Patient Name: Janusz Torres  WRTW:[id number]  : 1960  [x]  Patient  Verified  Payor: Karan Kawasaki / Plan: [place] [employer] / Product Type: PPO /    In time:1016  Out time:1106  Total Treatment Time (min): 50  Visit #: 3 of 8      Treatment Area: Right shoulder pain [M25.511]    SUBJECTIVE  Pain Level (0-10 scale): 3  Any medication changes, allergies to medications, adverse drug reactions, diagnosis change, or new procedure performed?: [x] No    [] Yes (see summary sheet for update)  Subjective functional status/changes:   [] No changes reported  Patient reports generalized \"achiness\" with patient unable to contribute to specific activity nor position but reporting pain common in the AM.     OBJECTIVE    24 min Therapeutic Exercise:  [x] See flow sheet : Emphasis placed on improving available shoulder ROM and strength   Rationale: increase ROM and increase strength to improve the patients ability to improve functional activity tolerance. 16 min Neuromuscular Re-education:  [x]  See flow sheet : Emphasis placed on improving activation and recruitment of the glenohumeral and scapulothoracic musculature and improving scapulohumeral rhythm with UE elevation   Rationale: increase strength and increase proprioception  to improve the patients ability to improve ease with self-care ADLs    10 min Manual Therapy:    Supine, Right GH Inferior Grade II-III Mobilization ([employer] deg abd)  Supine, Right GH AP Grade II-III Mobilization ([employer], abd+IR)   The manual therapy interventions were performed at a separate and distinct time from the therapeutic activities interventions.   Rationale: decrease pain, increase ROM and increase tissue extensibility to improve ease with return to work          With   [] TE   [] TA   [] neuro   [] other: Patient Education: [x] Review HEP    [] Progressed/Changed HEP based on:   [] positioning   [] body mechanics   [] transfers   [] heat/ice application [] other:      Other Objective/Functional Measures:   Right Shoulder Flexion AROM 127 deg, Right Shoulder Abduction AROM 95 deg     Pain Level (0-10 scale) post treatment: 7    ASSESSMENT/Changes in Function: With PROM assessment with abnormal firm end-feel noted with poor scapulohumeral dissociation upon assessment of shoulder abduction with hypomobile inferior glide. With exercise performance with greater weakness evident within the frontal plane in comparison to the sagittal plane. Patient will continue to benefit from skilled PT services to modify and progress therapeutic interventions, address functional mobility deficits, address ROM deficits, address strength deficits, analyze and address soft tissue restrictions, analyze and cue movement patterns, analyze and modify body mechanics/ergonomics, assess and modify postural abnormalities and instruct in home and community integration to attain remaining goals. []  See Plan of Care  []  See progress note/recertification  []  See Discharge Summary         Progress towards goals / Updated goals: 1. Pt will increase AROM right shoulder flex to 140 degs, ABD to 125 degs to improve ability to bathe with less pain. At PN:  Progressing: AROM right shoulder flexion 0-135 deg, abduction 0-115 deg. Current:  Remains, Right Shoulder Flexion AROM 127 deg, Right Shoulder Abduction AROM 95 deg, 2/1/2022  2. Patient will perform functionals reach of ER T1 and IR to L1 to increase ease with styling hair and donning belts. AT PN: Progressing: functional reach ER = T1, IR = right glut   3. Pt will increase FOTO score 64 points to improve ability to perform ADLs.   AT PN: Progressing: FOTO = 50.   4. Pt will be able to don/doff a shirt/sling with no difficulty/pain to improve independence with dressing.   At PN:  : Progressing: little difficulty with donning/doffing shirt.        PLAN  [x]  Upgrade activities as tolerated     [x]  Continue plan of care  []  Update interventions per flow sheet       []  Discharge due to:_  []  Other:_      Henretta Holter, PT 2/1/2022  7:00 AM    Future Appointments   Date Time Provider Vernon Lawson   2/2/2022  9:30 AM Yarelis Esposito PTA [facility]   2/8/2022 11:00 AM Yarelis Esposito PTA [facility]   2/10/2022 10:15 AM Yarelis Esposito PTA [facility]   3/7/2022  1:10 PM Roz Rodarte MD [facility] BS AMB

## 2022-02-02 ENCOUNTER — HOSPITAL ENCOUNTER (OUTPATIENT)
Dept: PHYSICAL THERAPY | Age: 62
Discharge: HOME | End: 2022-02-02
Attending: ORTHOPAEDIC SURGERY
Payer: COMMERCIAL

## 2022-02-02 PROCEDURE — 97112 NEUROMUSCULAR REEDUCATION: CPT

## 2022-02-02 PROCEDURE — 97110 THERAPEUTIC EXERCISES: CPT

## 2022-02-02 PROCEDURE — 97140 MANUAL THERAPY 1/> REGIONS: CPT

## 2022-02-02 NOTE — PROGRESS NOTES
PT DAILY TREATMENT NOTE     Patient Name: Kashmir Raygoza  Date:2022  : 1960  [x]  Patient  Verified  Payor: Jeramie Can / Plan: [place] [employer] [place] / Product Type: PPO /    In time:9:32  Out time:10:21  Total Treatment Time (min): 49  Visit #: 4 of 8      Treatment Area: Right shoulder pain [M25.511]    SUBJECTIVE  Pain Level (0-10 scale): 4  Any medication changes, allergies to medications, adverse drug reactions, diagnosis change, or new procedure performed?: [x] No    [] Yes (see summary sheet for update)  Subjective functional status/changes:   [] No changes reported  Patient reports she is sore from yesterdays therapy session. OBJECTIVE       23 min Therapeutic Exercise:  [x]????????? See flow sheet :   Rationale: increase ROM, increase strength and improve coordination to improve the patients ability to increase ease with ADLS.          17 min Neuromuscular Re-education:  [x]??????  See flow sheet :scapular stability    Rationale: increase ROM, increase strength, improve coordination, improve balance and increase proprioception  to improve the patients ability to increase ease with overhead reach.         9 min Manual Therapy:    Supine- PROM right shoulder within protocol. Supine- right shoulder GHJ mobs grade I-II inferior and PA mobs.      The manual therapy interventions were performed at a separate and distinct time from the therapeutic activities interventions. Rationale: decrease pain, increase ROM, increase tissue extensibility and decrease trigger points to increase ease with dressing.                                                           With   [] TE   [] TA   [] neuro   [] other: Patient Education: [x] Review HEP    [] Progressed/Changed HEP based on:   [] positioning   [] body mechanics   [] transfers   [] heat/ice application    [] other:      Other Objective/Functional Measures: AAROM shoulder ER with towel = L1.       Pain Level (0-10 scale) post treatment: 5    ASSESSMENT/Changes in Function: Encouraged patient to perform HEP 2x a day to notice increase ease and decrease soreness after ease therapy session. Patient continues to be limited with PROM of shoulder IR. Patient will continue to benefit from skilled PT services to modify and progress therapeutic interventions, address functional mobility deficits, address ROM deficits, address strength deficits, analyze and address soft tissue restrictions and analyze and cue movement patterns to attain remaining goals. []  See Plan of Care  []  See progress note/recertification  []  See Discharge Summary         Progress towards goals / Updated goals: 1. Pt will increase AROM right shoulder flex to 140 degs, ABD to 125 degs to improve ability to bathe with less pain. At PN:  Progressing: AROM right shoulder flexion 0-135 deg, abduction 0-115 deg.   Current:  Remains, Right Shoulder Flexion AROM 127 deg, Right Shoulder Abduction AROM 95 deg, 2/1/2022  2. Patient will perform functionals reach of ER T1 and IR to L1 to increase ease with styling hair and donning belts. AT PN: Progressing: functional reach ER = T1, IR = right glut   3. Pt will increase FOTO score 64 points to improve ability to perform ADLs.   AT PN: Progressing: FOTO = 50.   4. Pt will be able to don/doff a shirt/sling with no difficulty/pain to improve independence with dressing.   At PN:  : Progressing: little difficulty with donning/doffing shirt.        PLAN  []  Upgrade activities as tolerated     [x]  Continue plan of care  []  Update interventions per flow sheet       []  Discharge due to:_  []  Other:_      Gayatri Roche PTA 2/2/2022  9:08 AM    Future Appointments   Date Time Provider Vernon Lawson   2/2/2022  9:30 AM González Bender PTA [facility]   2/8/2022 11:00 AM González Bender PTA [facility]   2/10/2022 10:15 AM González Bender PTA [facility]   2/15/2022 11:00 AM González Bender PTA [facility]   2/17/2022 11:00 AM Roney Trotter, [place] [facility]   3/7/2022  1:10 PM Gino Jack MD [facility] BS AMB

## 2022-02-03 ENCOUNTER — APPOINTMENT (OUTPATIENT)
Dept: PHYSICAL THERAPY | Age: 62
End: 2022-02-03
Attending: ORTHOPAEDIC SURGERY
Payer: COMMERCIAL

## 2022-02-08 ENCOUNTER — HOSPITAL ENCOUNTER (OUTPATIENT)
Dept: PHYSICAL THERAPY | Age: 62
Discharge: HOME | End: 2022-02-08
Attending: ORTHOPAEDIC SURGERY
Payer: COMMERCIAL

## 2022-02-08 PROCEDURE — 97110 THERAPEUTIC EXERCISES: CPT

## 2022-02-08 PROCEDURE — 97140 MANUAL THERAPY 1/> REGIONS: CPT

## 2022-02-08 PROCEDURE — 97112 NEUROMUSCULAR REEDUCATION: CPT

## 2022-02-08 NOTE — PROGRESS NOTES
PT DAILY TREATMENT NOTE     Patient Name: Naomi Onofre  Date:2022  : 1960  [x]  Patient  Verified  Payor: Christie Garcia / Plan: [place] [employer] PPO / Product Type: PPO /    In time:11:02  Out time:12:00  Total Treatment Time (min): 58  Visit #: 5 of 8      Treatment Area: Right shoulder pain [M25.511]    SUBJECTIVE  Pain Level (0-10 scale): 0  Any medication changes, allergies to medications, adverse drug reactions, diagnosis change, or new procedure performed?: [x] No    [] Yes (see summary sheet for update)  Subjective functional status/changes:   [] No changes reported  Patient states that bathing and toileting activities have become easier. OBJECTIVE      Modality rationale: decrease pain to improve the patients ability to decrease difficulty while performing tasks. Min Type Additional Details    10 [x]? Ice     []?  heat  []? Ice massage  []? Laser   []? Anodyne Position:sitting  Location:shoulder    []? Skin assessment post-treatment:  []?intact []? redness- no adverse reaction             23 min Therapeutic Exercise:  [x]?????????? See flow sheet :   Rationale: increase ROM, increase strength and improve coordination to improve the patients ability to increase ease with ADLS.          16 min Neuromuscular Re-education:  [x]???????  See flow sheet :scapular stability    Rationale: increase ROM, increase strength, improve coordination, improve balance and increase proprioception  to improve the patients ability to increase ease with overhead reach.         9 min Manual Therapy:    Supine- PROM right shoulder within protocol. Supine- right shoulder GHJ mobs grade I-II inferior and PA mobs.      The manual therapy interventions were performed at a separate and distinct time from the therapeutic activities interventions.   Rationale: decrease pain, increase ROM, increase tissue extensibility and decrease trigger points to increase ease with dressing.                  With   [] TE   [] TA   [] neuro   [] other: Patient Education: [x] Review HEP    [] Progressed/Changed HEP based on:   [] positioning   [] body mechanics   [] transfers   [] heat/ice application    [] other:      Other Objective/Functional Measures:  functional reach ER =T1, IR = sacrum     Pain Level (0-10 scale) post treatment: 0    ASSESSMENT/Changes in Function: Patient is continuing to make progress with increasing right shoulder mobility. Patient continues to be most limited with functional IR reaching, initiated IR stretch with towel to increase ease. Patient will continue to benefit from skilled PT services to modify and progress therapeutic interventions, address functional mobility deficits, address ROM deficits, address strength deficits, analyze and address soft tissue restrictions and analyze and cue movement patterns to attain remaining goals. []  See Plan of Care  []  See progress note/recertification  []  See Discharge Summary         Progress towards goals / Updated goals: 1. Pt will increase AROM right shoulder flex to 140 degs, ABD to 125 degs to improve ability to bathe with less pain. At PN:  Progressing: AROM right shoulder flexion 0-135 deg, abduction 0-115 deg.   Current:  Remains, Right Shoulder Flexion AROM 127 deg, Right Shoulder Abduction AROM 95 deg, 2/1/2022  2. Patient will perform functionals reach of ER T1 and IR to L1 to increase ease with styling hair and donning belts. AT PN: Progressing: functional reach ER = T1, IR = right glut   Current: Progressing; functional reach ER =T1, IR = sacrum. 2/8/22  3. Pt will increase FOTO score 64 points to improve ability to perform ADLs. AT PN: Progressing: FOTO = 50.   4. Pt will be able to don/doff a shirt/sling with no difficulty/pain to improve independence with dressing.   At PN:  : Progressing: little difficulty with donning/doffing shirt. Current: MET: pt has no difficulty don/doff sling.  2/8/22        PLAN  []  Upgrade activities as tolerated [x]  Continue plan of care  []  Update interventions per flow sheet       []  Discharge due to:_  []  Other:_      Evangelina Barragan PTA 2/8/2022  7:09 AM    Future Appointments   Date Time Provider Vernon Lawson   2/8/2022 11:00 AM Catarina Hudson, PTA [facility]   2/10/2022 10:15 AM Catarina Hudson PTA [facility]   2/15/2022 11:00 AM Catarina Hudson PTA [facility]   2/17/2022 11:00 AM Carl Kearney, PT [facility]   3/7/2022  1:10 PM Ben Greenberg MD [facility] BS AMB

## 2022-02-10 ENCOUNTER — HOSPITAL ENCOUNTER (OUTPATIENT)
Dept: PHYSICAL THERAPY | Age: 62
Discharge: HOME | End: 2022-02-10
Attending: ORTHOPAEDIC SURGERY
Payer: COMMERCIAL

## 2022-02-10 PROCEDURE — 97112 NEUROMUSCULAR REEDUCATION: CPT

## 2022-02-10 PROCEDURE — 97110 THERAPEUTIC EXERCISES: CPT

## 2022-02-10 NOTE — PROGRESS NOTES
[facility]              [address]        [place], [address] [place]   [phone] fax    Progress Note  Patient name: Kashmir Raygoza Start of Care: 2021   Referral source: Ochoa Andrade,* : 1960   Medical/Treatment Diagnosis: Right shoulder pain [M25.511]  Payor: Jeramie Can / Plan: [place] [employer] PPO / Product Type: PPO /  Onset Date:DoS 2021     Prior Hospitalization: see medical history Provider#: [id number]   Medications: Verified on Patient Summary List    Comorbidities: HTN   Prior Level of Function: Independent with ADLs, functional, and work tasks with pain in the right shoulder. Visits from Start of Care: 16    Missed Visits: 5    Established Goals: 1. Pt will increase AROM right shoulder flex to 140 degs, ABD to 125 degs to improve ability to bathe with less pain. At PN:  Progressing: AROM right shoulder flexion 0-135 deg, abduction 0-115 deg.   Current: Met, Right Shoulder Flexion AROM 140 deg, Right Shoulder Abduction AROM 130 deg,   2. Patient will perform functionals reach of ER T1 and IR to L1 to increase ease with styling hair and donning belts. At PN: Progressing: functional reach ER = T1, IR = right glut   Current: Progressing; functional reach ER =T1, IR = sacrum. 3.Pt will increase FOTO score 64 points to improve ability to perform ADLs. At PN: Progressing: FOTO = 50. Current: Progressing: FOTO = 59.   4. Pt will be able to don/doff a shirt/sling with no difficulty/pain to improve independence with dressing.   At PN: Progressing: little difficulty with donning/doffing shirt. Current: Met, Patient has no difficulty donning/doffing shirt     Key Functional Changes: See goals above. Updated Goals: to be achieved in 4 weeks:  1.. Patient will perform functionals reach of ER T1 and IR to L1 to increase ease with styling hair and donning belts. AT PN:  Progressing; functional reach ER =T1, IR = sacrum.    2.Pt will increase FOTO score 64 points to improve ability to perform ADLs. AT PN:Progressing: FOTO = 59.  3. Patient will be able to perform overhead activity for 45 seconds to increase ease with return back to work. AT PN: Overhead activity x15 sec. ASSESSMENT/RECOMMENDATIONS:    Patient subjectively has noticed increased ease with performance of functional tasks at home. Patient is able to style hair, perform toileting activities, and bathe with increased ease. Patient continues to be limited objectively with functional IR and lifting overhead.      Patient will continue to benefit from skilled PT services to modify and progress therapeutic interventions, address functional mobility deficits, address ROM deficits, address strength deficits, analyze and address soft tissue restrictions and analyze and cue movement patterns to attain remaining goals. [x]Continue therapy per initial plan/protocol at a frequency of  2 x per week for 4 weeks  []Continue therapy with the following recommended changes:_____________________      _____________________________________________________________________  []Discontinue therapy progressing towards or have reached established goals  []Discontinue therapy due to lack of appreciable progress towards goals  []Discontinue therapy due to lack of attendance or compliance  []Await Physician's recommendations/decisions regarding therapy  []Other:________________________________________________________________    Thank you for this referral.    Boris Rodgers, PTA 2/10/2022 10:21 AM  NOTE TO PHYSICIAN:  Melissa Yanez [address]   [facility]: [phone]  If you are unable to process this request in 24 hours please contact our office: [phone]    []  I have read the above report and request that my patient continue as recommended.   []  I have read the above report and request that my patient continue therapy with the following changes/special instructions:________________________________________  []I have read the above report and request that my patient be discharged from therapy.     [de-identified] Signature:____________Date:_________TIME:________     Lisy Hawk,*  ** Signature, Date and Time must be completed for valid certification **

## 2022-02-10 NOTE — PROGRESS NOTES
PT DAILY TREATMENT NOTE     Patient Name: Naomi Onofre  Date:2/10/2022  : 1960  [x]  Patient  Verified  Payor: Christie Garcia / Plan: [place] [employer] PPO / Product Type: PPO /    In time:10:16  Out time:11:05  Total Treatment Time (min): 49  Visit #: 6 of 8      Treatment Area: Right shoulder pain [M25.511]    SUBJECTIVE  Pain Level (0-10 scale): 0  Any medication changes, allergies to medications, adverse drug reactions, diagnosis change, or new procedure performed?: [x] No    [] Yes (see summary sheet for update)  Subjective functional status/changes:   [] No changes reported  Patient reports she has noticed increase in ease with styling her hair. OBJECTIVE            23 min Therapeutic Exercise:  [x]??????????? See flow sheet :   Rationale: increase ROM, increase strength and improve coordination to improve the patients ability to increase ease with ADLS.          26 min Neuromuscular Re-education:  [x]????????  See flow sheet :scapular stability    Rationale: increase ROM, increase strength, improve coordination, improve balance and increase proprioception  to improve the patients ability to increase ease with overhead reach.                  With   [] TE   [] TA   [] neuro   [] other: Patient Education: [x] Review HEP    [] Progressed/Changed HEP based on:   [] positioning   [] body mechanics   [] transfers   [] heat/ice application    [] other:      Other Objective/Functional Measures: see goals      Pain Level (0-10 scale) post treatment: 7    ASSESSMENT/Changes in Function: Patient has noticed increase ease with performing functional tasks at home. Patient is able to style hair, perform toileting activities, and bathe with increased ease. Patient continues to be limited with functional IR and lifting overhead.      Patient will continue to benefit from skilled PT services to modify and progress therapeutic interventions, address functional mobility deficits, address ROM deficits, address strength deficits, analyze and address soft tissue restrictions and analyze and cue movement patterns to attain remaining goals. []  See Plan of Care  [x]  See progress note/recertification  []  See Discharge Summary         Progress towards goals / Updated goals: 1. Pt will increase AROM right shoulder flex to 140 degs, ABD to 125 degs to improve ability to bathe with less pain. At PN:  Progressing: AROM right shoulder flexion 0-135 deg, abduction 0-115 deg.   Current:Progressing: , Right Shoulder Flexion AROM 140 deg, Right Shoulder Abduction AROM 130 deg, 2/10/2022  2. Patient will perform functionals reach of ER T1 and IR to L1 to increase ease with styling hair and donning belts. AT PN: Progressing: functional reach ER = T1, IR = right glut   Current: Progressing; functional reach ER =T1, IR = sacrum. 2/8/22  3. Pt will increase FOTO score 64 points to improve ability to perform ADLs. AT PN: Progressing: FOTO = 50. Current: Progressing: FOTO = 59. 2/10/22  4. Pt will be able to don/doff a shirt/sling with no difficulty/pain to improve independence with dressing.   At PN:  : Progressing: little difficulty with donning/doffing shirt. Current: MET: pt has no difficulty don/doff sling.  2/8/22        PLAN  []  Upgrade activities as tolerated     [x]  Continue plan of care  []  Update interventions per flow sheet       []  Discharge due to:_  []  Other:_      Jose Alejandro Hall PTA 2/10/2022  10:13 AM    Future Appointments   Date Time Provider Vernon Lawson   2/10/2022 10:15 AM Dustin Ji PTA [facility]   2/15/2022 11:00 AM Dustin Ji PTA [facility]   2/17/2022 11:00 AM James Garner PT [facility]   3/7/2022  1:10 PM Conchita Betancur MD [facility] AMB

## 2022-02-12 ENCOUNTER — DOCUMENTATION ONLY (OUTPATIENT)
Dept: ORTHOPEDIC SURGERY | Age: 62
End: 2022-02-12

## 2022-02-12 NOTE — PROGRESS NOTES
Short Term Disability Claim Form completed, faxed and confirmation received. Copy made for scanning. Original placed in forms file at [facility].

## 2022-02-14 ENCOUNTER — TELEPHONE (OUTPATIENT)
Dept: PHYSICAL THERAPY | Age: 62
End: 2022-02-14

## 2022-02-15 ENCOUNTER — TELEPHONE (OUTPATIENT)
Dept: PHYSICAL THERAPY | Age: 62
End: 2022-02-15

## 2022-02-15 ENCOUNTER — APPOINTMENT (OUTPATIENT)
Dept: PHYSICAL THERAPY | Age: 62
End: 2022-02-15
Attending: ORTHOPAEDIC SURGERY
Payer: COMMERCIAL

## 2022-02-16 DIAGNOSIS — N95.9 MENOPAUSAL DISORDER: ICD-10-CM

## 2022-02-16 DIAGNOSIS — I10 ESSENTIAL HYPERTENSION: ICD-10-CM

## 2022-02-16 DIAGNOSIS — N95.1 HOT FLASHES DUE TO MENOPAUSE: ICD-10-CM

## 2022-02-16 NOTE — TELEPHONE ENCOUNTER
----- Message from Rafa Grider sent at 2/16/2022 12:09 PM EST -----  Subject: Refill Request    QUESTIONS  Name of Medication? amLODIPine (NORVASC) 10 mg tablet  Patient-reported dosage and instructions? Take 1 Tablet by mouth daily. How many days do you have left? 5  Preferred Pharmacy? [email or web address] [id number] phone number (if available)? [phone]  ---------------------------------------------------------------------------  --------------,  Name of Medication? venlafaxine-SR (EFFEXOR-XR) 75 mg capsule  Patient-reported dosage and instructions? TAKE 1 CAPSULE DAILY  How many days do you have left? 5  Preferred Pharmacy? [email or web address] [id number] phone number (if available)? [phone]  ---------------------------------------------------------------------------  --------------,  Name of Medication? esomeprazole (NEXIUM) 40 mg capsule  Patient-reported dosage and instructions? TAKE 1 CAPSULE DAILY  How many days do you have left? 5  Preferred Pharmacy? [id number] [facility] phone number (if available)? [phone]  ---------------------------------------------------------------------------  --------------,  Name of Medication? conjugated estrogens (PREMARIN) 0.3 mg tablet  Patient-reported dosage and instructions? Take 1 Tablet by mouth daily. How many days do you have left? 5  Preferred Pharmacy? [id number] phone number (if available)? [phone]  ---------------------------------------------------------------------------  --------------  CALL BACK INFO  What is the best way for the office to contact you? OK to leave message on   voicemail  Preferred Call Back Phone Number?  [phone]

## 2022-02-17 ENCOUNTER — APPOINTMENT (OUTPATIENT)
Dept: PHYSICAL THERAPY | Age: 62
End: 2022-02-17
Attending: ORTHOPAEDIC SURGERY
Payer: COMMERCIAL

## 2022-02-18 RX ORDER — VENLAFAXINE HYDROCHLORIDE 75 MG/1
75 CAPSULE, EXTENDED RELEASE ORAL DAILY
Qty: 90 CAPSULE | Refills: 1 | Status: SHIPPED | OUTPATIENT
Start: 2022-02-18 | End: 2022-02-28 | Stop reason: SDUPTHER

## 2022-02-24 ENCOUNTER — TELEPHONE (OUTPATIENT)
Dept: PHYSICAL THERAPY | Age: 62
End: 2022-02-24

## 2022-02-25 DIAGNOSIS — G47.00 INSOMNIA, UNSPECIFIED TYPE: ICD-10-CM

## 2022-02-25 RX ORDER — ESZOPICLONE 3 MG/1
3 TABLET, FILM COATED ORAL
Qty: 90 TABLET | Refills: 0 | Status: SHIPPED | OUTPATIENT
Start: 2022-02-28 | End: 2022-05-19 | Stop reason: SDUPTHER

## 2022-02-25 NOTE — TELEPHONE ENCOUNTER
Patient need a medication refill. Please advise    Requested Prescriptions     Pending Prescriptions Disp Refills    eszopiclone (LUNESTA) 3 mg tablet 90 Tablet 0     Sig: Take 1 Tablet by mouth nightly as needed for Sleep. Max Daily Amount: 3 mg.

## 2022-02-28 DIAGNOSIS — M75.121 COMPLETE TEAR OF RIGHT ROTATOR CUFF, UNSPECIFIED WHETHER TRAUMATIC: ICD-10-CM

## 2022-02-28 DIAGNOSIS — N95.1 HOT FLASHES DUE TO MENOPAUSE: ICD-10-CM

## 2022-02-28 NOTE — TELEPHONE ENCOUNTER
Patient requesting refill on this medication, please advise. Requested Prescriptions     Pending Prescriptions Disp Refills    venlafaxine-SR (EFFEXOR-XR) 75 mg capsule 90 Capsule 1     Sig: Take 1 Capsule by mouth daily.

## 2022-02-28 NOTE — TELEPHONE ENCOUNTER
----- Message from Prescott Bumpers sent at 2/25/2022 12:11 PM EST -----  Subject: Message to Provider    QUESTIONS  Information for Provider? Patient requested refills on the 16th to [facility] and has not heard anything about them. The medications are   Nexium and Premarin. Please call back and advise as patient is running low   on meds. ---------------------------------------------------------------------------  --------------  Omelia Counter INFO  What is the best way for the office to contact you? OK to leave message on   voicemail  Preferred Call Back Phone Number? [phone]  ---------------------------------------------------------------------------  --------------  SCRIPT ANSWERS  Relationship to Patient?  Self

## 2022-02-28 NOTE — TELEPHONE ENCOUNTER
Requested Prescriptions     Pending Prescriptions Disp Refills    esomeprazole (NEXIUM) 40 mg capsule 90 Capsule 3     Sig: TAKE 1 CAPSULE DAILY    will have FD call to schedule appointment as she is due for follow up .

## 2022-03-01 RX ORDER — TRAMADOL HYDROCHLORIDE 50 MG/1
TABLET ORAL
Qty: 28 TABLET | OUTPATIENT
Start: 2022-03-01

## 2022-03-01 RX ORDER — VENLAFAXINE HYDROCHLORIDE 75 MG/1
75 CAPSULE, EXTENDED RELEASE ORAL DAILY
Qty: 90 CAPSULE | Refills: 1 | Status: SHIPPED | OUTPATIENT
Start: 2022-03-01 | End: 2022-07-07 | Stop reason: SDUPTHER

## 2022-03-01 RX ORDER — ESOMEPRAZOLE MAGNESIUM 40 MG/1
CAPSULE, DELAYED RELEASE ORAL
Qty: 90 CAPSULE | Refills: 3 | Status: SHIPPED | OUTPATIENT
Start: 2022-03-01

## 2022-03-04 ENCOUNTER — TELEPHONE (OUTPATIENT)
Dept: PHYSICAL THERAPY | Age: 62
End: 2022-03-04

## 2022-03-04 ENCOUNTER — HOSPITAL ENCOUNTER (OUTPATIENT)
Dept: PHYSICAL THERAPY | Age: 62
End: 2022-03-04
Attending: ORTHOPAEDIC SURGERY
Payer: COMMERCIAL

## 2022-03-07 ENCOUNTER — OFFICE VISIT (OUTPATIENT)
Dept: ORTHOPEDIC SURGERY | Age: 62
End: 2022-03-07
Payer: COMMERCIAL

## 2022-03-07 ENCOUNTER — DOCUMENTATION ONLY (OUTPATIENT)
Dept: ORTHOPEDIC SURGERY | Age: 62
End: 2022-03-07

## 2022-03-07 VITALS — HEIGHT: 63 IN | OXYGEN SATURATION: 98 % | HEART RATE: 95 BPM | WEIGHT: 171 LBS | BODY MASS INDEX: 30.3 KG/M2

## 2022-03-07 DIAGNOSIS — M75.121 COMPLETE TEAR OF RIGHT ROTATOR CUFF, UNSPECIFIED WHETHER TRAUMATIC: Primary | ICD-10-CM

## 2022-03-07 PROCEDURE — 99213 OFFICE O/P EST LOW 20 MIN: CPT | Performed by: ORTHOPAEDIC SURGERY

## 2022-03-07 NOTE — LETTER
NOTIFICATION RETURN TO WORK / SCHOOL    3/7/2022 1:39 PM    Ms. Karin Peña  [address] [place] [address] [address]      To Whom It May Concern:    Karin Peña is currently under the care of [address] [place] [address] Eleazar Sood. She was evaluated in the office today and is cleared to return to work on 3/14/2022 with the following restrictions: no overhead work and no lifting over 10 pounds    If there are questions or concerns please have the patient contact our office.         Sincerely,      Pernell Sepulveda MD

## 2022-03-07 NOTE — PROGRESS NOTES
[facility] faxed over Short Term Disability Claim Form to [facility] [employer]. Blank copy scanned in to CC.

## 2022-03-07 NOTE — PROGRESS NOTES
Amilcar Lacey  1960     HISTORY OF PRESENT ILLNESS  Amilcar Lacey is a 62 y.o. female who presents today for evaluation s/p Right shoulder arthroscopic rotator cuff repair on 11/01/2021. Patient has been going to PT. Describes pain as a 5/10. She has been going to PT and is overall improving. She notes pain with reaching behind her back and overhead reaching. 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 Patient denies any fever, chills, chest pain, shortness of breath or calf pain. There are no new illness or injuries to report since last seen in the office. PHYSICAL EXAM:   Visit Vitals  Pulse 95   Ht 5' 3\" (1.6 m)   Wt 171 lb (77.6 kg)   LMP 12/31/1996   SpO2 98%   BMI 30.29 kg/m²      The patient is a well-developed, well-nourished female in no acute distress. The patient is alert and oriented times three. The patient appears to be well groomed. Mood and affect are normal.  ORTHOPEDIC EXAM of Right shoulder:  Inspection: swelling none  Incision, clean, dry, intact, sutures in place  Passive glenohumeral abduction 0- 120 degrees  Stability: Stable  Strength: n/a  2+ distal pulses      IMPRESSION:   S/p Right shoulder arthroscopic rotator cuff repair       PLAN: 1. Patient presents today s/p right rotator cuff repair on 11/1/2021. She is overall improving and is doing well today. Provided a work note with the following restrictions: no over head work and no lifting over 10 pounds. Return in 6 weeks     Risk factors include: BMI>30  2. No ultrasound exam indicated today  3. No cortisone injection indicated today   4. No Physical/Occupational Therapy indicated today  5. No diagnostic test indicated today:   6. No durable medical equipment indicated today  7. No referral indicated today   8. No medications indicated today:   9.  No Narcotic indicated today        RTC 6 weeks       Scribed by Merced [facility]) as dictated by Cathryn Ramirez MD ZUNIGA, Dr. Yamilka Alexander, confirm that all documentation is accurate.     Yamilka Alexander M.D.   [facility]

## 2022-03-09 ENCOUNTER — HOSPITAL ENCOUNTER (OUTPATIENT)
Dept: PHYSICAL THERAPY | Age: 62
Discharge: HOME | End: 2022-03-09
Attending: ORTHOPAEDIC SURGERY
Payer: COMMERCIAL

## 2022-03-09 PROCEDURE — 97110 THERAPEUTIC EXERCISES: CPT

## 2022-03-09 PROCEDURE — 97530 THERAPEUTIC ACTIVITIES: CPT

## 2022-03-09 PROCEDURE — 97140 MANUAL THERAPY 1/> REGIONS: CPT

## 2022-03-09 NOTE — PROGRESS NOTES
PT DAILY TREATMENT NOTE     Patient Name: Ryan Miles  Date:3/9/2022  : 1960  [x]  Patient  Verified  Payor: Dorothy Ovalle / [place]: [place] [employer] / Product Type: PPO /    In time:325  Out time:413  Total Treatment Time (min): 48  Visit #: 1 of 8      Treatment Area: Right shoulder pain [M25.511]    SUBJECTIVE  Pain Level (0-10 scale): 0/10  Any medication changes, allergies to medications, adverse drug reactions, diagnosis change, or new procedure performed?: [x] No    [] Yes (see summary sheet for update)  Subjective functional status/changes:   [] No changes reported  Pt reports no pain in her shoulder today. States she continues to have difficulty with overhead and behind the back motions    OBJECTIVE    22 min Therapeutic Exercise:  [x] See flow sheet :   Rationale: increase ROM and increase strength to improve the patients ability to perform daily tasks and activities with ease    18 min Therapeutic Activity:  [x]  See flow sheet :FOTO, reassessment   Rationale: increase ROM and increase strength  to improve the patients ability to perform ADL's     8 min Manual Therapy:  STM/DTM UT, LS, medial scap border. Gentle PROM in all planes in supine   The manual therapy interventions were performed at a separate and distinct time from the therapeutic activities interventions. Rationale: decrease pain, increase ROM and decrease trigger points to perform ADL's pain free and safely          With   [] TE   [] TA   [] neuro   [] other: Patient Education: [x] Review HEP    [] Progressed/Changed HEP based on:   [] positioning   [] body mechanics   [] transfers   [] heat/ice application    [] other:      Other Objective/Functional Measures:    FOTO: 50  functional reach ER =T1, IR = sacrum  Pt able to perform OH ball toss for 20 seconds before experiencing fatigue    Pain Level (0-10 scale) post treatment: 0/10    ASSESSMENT/Changes in Function: See progress note    Patient will continue to benefit from skilled PT services to modify and progress therapeutic interventions, address functional mobility deficits, address ROM deficits and address strength deficits to attain remaining goals. []  See Plan of Care  [x]  See progress note/recertification  []  See Discharge Summary         Progress towards goals / Updated goals:  1. Patient will perform functionals reach of ER T1 and IR to L1 to increase ease with styling hair and donning belts. AT PN:  Progressing; functional reach ER =T1, IR = sacrum. Current: NOT MET but progressing functional reach ER =T1, IR = sacrum  (3/9/22)  2. Pt will increase FOTO score 64 points to improve ability to perform ADLs. AT PN:Progressing: FOTO = 59. Current: NOT MET: Regressed-FOTO = 50 (3/9/22)  3. Patient will be able to perform overhead activity for 45 seconds to increase ease with return back to work. AT PN: Overhead activity x15 sec.     Current: NOT MET but progressing: Overhead activity x 20 sec (3/9/22)    PLAN  [x]  Upgrade activities as tolerated     [x]  Continue plan of care  []  Update interventions per flow sheet       []  Discharge due to:_  []  Other:_      Messi Horton PTA 3/9/2022  10:04 AM    Future Appointments   Date Time Provider Vrenon Lawson   3/9/2022  3:30 PM Emory Luu [facility]   3/10/2022  3:30 PM Flower King, PTA [facility]   3/15/2022  5:00 PM Elena Bah PT [facility]   3/17/2022  5:00 PM Flower King, PTA [facility]   3/21/2022  5:00 PM Flower King, PTA [facility]   3/24/2022  5:00 PM Flower King, PTA [facility]   3/28/2022  5:00 PM Flower King, PTA [facility]   3/31/2022  5:00 PM Flower King PTA [facility]   4/18/2022  1:20 PM Rosa Damon MD [facility]

## 2022-03-09 NOTE — PROGRESS NOTES
[facility]              [address]        [place], [address] [place]   [phone] fax    Progress Note  Patient name: Nikki Vargas Start of Care: 2021   Referral source: Sarika Murray,* : 1960   Medical/Treatment Diagnosis: Right shoulder pain [M25.511]  Payor: Yoshi Bauer / Plan: [place] [employer] / Product Type: PPO /  Onset Date:DoS 2021     Prior Hospitalization: see medical history Provider#: [id number]   Medications: Verified on Patient Summary List    Comorbidities: HTN  Prior Level of Function:Independent with ADLs, functional, and work tasks with pain in the right shoulder. Visits from Start of Care: 17    Missed Visits: 8    Established Goals:          1. Patient will perform functionals reach of ER T1 and IR to L1 to increase ease with styling hair and donning belts. At Last PN:  Progressing; functional reach ER =T1, IR = sacrum. Current: Progressing, functional reach ER =T1, IR = sacrum  2. Pt will increase FOTO score 64 points to improve ability to perform ADLs. At Last PN:Progressing: FOTO = 59. Current: Regressed-FOTO = 50  3. Patient will be able to perform overhead activity for 45 seconds to increase ease with return back to work. At Last PN: Overhead activity x15 sec.    Current: Progressing, Overhead activity x 20 sec     Key Functional Changes: Decreased pain, Increased AROM    Updated Goals: to be achieved in 4 weeks:  1. Patient will perform functionals reach of ER T1 and IR to L1 to increase ease with styling hair and donning belts. AT PN:  Progressing - functional reach ER =T1, IR = sacrum   2. Pt will increase FOTO score 64 points to improve ability to perform ADLs. AT PN:Progressing: FOTO = 50  3. Patient will be able to perform overhead activity for 45 seconds to increase ease with return back to work.   AT PN: Overhead activity x 20 sec    ASSESSMENT/RECOMMENDATIONS:     Pt reports 90% improvement since starting therapy. Pt has attended one therapy visit in the last four weeks secondary to awaiting insurance authorization extension approval. Pt educated on importance of attending therapy more consistently to maintain functional gains. Pt has not achieved any updated goals since last re-assessment but is progressing towards remaining goals. Pt continues to have decreased shoulder flexion and IR AROM and reports difficulty with overhead activities. Pt can benefit from additional skilled therapy to increase functional strength and mobility for ease of ADL's and for preparation for return to work. Patient will continue to benefit from skilled PT services to modify and progress therapeutic interventions, address functional mobility deficits, address ROM deficits and address strength deficits to attain remaining goals. [x]Continue therapy per initial plan/protocol at a frequency of  2 x per week for 4 weeks  []Continue therapy with the following recommended changes:_____________________      _____________________________________________________________________  []Discontinue therapy progressing towards or have reached established goals  []Discontinue therapy due to lack of appreciable progress towards goals  []Discontinue therapy due to lack of attendance or compliance  []Await Physician's recommendations/decisions regarding therapy  []Other:________________________________________________________________    Thank you for this referral.    Messi Jamisonmp, PTA 3/9/2022 3:30 PM  NOTE TO PHYSICIAN:  Melissa Yanez [address]   [facility]: [phone]  If you are unable to process this request in 24 hours please contact our office: [phone]    []  I have read the above report and request that my patient continue as recommended.   []  I have read the above report and request that my patient continue therapy with the following changes/special instructions:________________________________________  []I have read the above report and request that my patient be discharged from therapy.     [de-identified] Signature:____________Date:_________TIME:________     Ely Nicholson,*  ** Signature, Date and Time must be completed for valid certification **

## 2022-03-10 ENCOUNTER — HOSPITAL ENCOUNTER (OUTPATIENT)
Dept: PHYSICAL THERAPY | Age: 62
Discharge: HOME | End: 2022-03-10
Attending: ORTHOPAEDIC SURGERY
Payer: COMMERCIAL

## 2022-03-10 PROCEDURE — 97110 THERAPEUTIC EXERCISES: CPT

## 2022-03-10 PROCEDURE — 97112 NEUROMUSCULAR REEDUCATION: CPT

## 2022-03-10 NOTE — PROGRESS NOTES
PT DAILY TREATMENT NOTE     Patient Name: Estrada Hebert  Date:3/10/2022  : 1960  [x]  Patient  Verified  Payor: Bruna Malone / Plan: [place] [employer] PPO / Product Type: PPO /    In time:3:29  Out time:4:13  Total Treatment Time (min): 44  Visit #: 1 of 8      Treatment Area: Right shoulder pain [M25.511]    SUBJECTIVE  Pain Level (0-10 scale): 6  Any medication changes, allergies to medications, adverse drug reactions, diagnosis change, or new procedure performed?: [x] No    [] Yes (see summary sheet for update)  Subjective functional status/changes:   [] No changes reported  Patient states she is sore from yesterdays therapy session due to having not done her exercises in four weeks when out of therapy. OBJECTIVE       23 min Therapeutic Exercise:  [x]???????????? See flow sheet :   Rationale: increase ROM, increase strength and improve coordination to improve the patients ability to increase ease with ADLS.          21 min Neuromuscular Re-education:  [x]?????????  See flow sheet :scapular stability    Rationale: increase ROM, increase strength, improve coordination, improve balance and increase proprioception  to improve the patients ability to increase ease with overhead reach.                     With   [] TE   [] TA   [] neuro   [] other: Patient Education: [x] Review HEP    [] Progressed/Changed HEP based on:   [] positioning   [] body mechanics   [] transfers   [] heat/ice application    [] other:      Other Objective/Functional Measures: Patient able to perform overhead activity for 20 sec without pain. Pain Level (0-10 scale) post treatment: 0    ASSESSMENT/Changes in Function: Educated patient on importance of performing and maintaining home exercises.      Patient will continue to benefit from skilled PT services to modify and progress therapeutic interventions, address functional mobility deficits, address ROM deficits, address strength deficits, analyze and address soft tissue restrictions and analyze and cue movement patterns to attain remaining goals. []  See Plan of Care  []  See progress note/recertification  []  See Discharge Summary         Progress towards goals / Updated goals:  Updated Goals: to be achieved in 4 weeks:  1. Patient will perform functionals reach of ER T1 and IR to L1 to increase ease with styling hair and donning belts. AT PN:  Progressing - functional reach ER =T1, IR = sacrum   2. Pt will increase FOTO score 64 points to improve ability to perform ADLs. AT PN:Progressing: FOTO = 50  3. Patient will be able to perform overhead activity for 45 seconds to increase ease with return back to work.   AT PN: Overhead activity x 20 sec    PLAN  [x]  Upgrade activities as tolerated     [x]  Continue plan of care  []  Update interventions per flow sheet       []  Discharge due to:_  []  Other:_      Marquise Madrid PTA 3/10/2022  9:14 AM    Future Appointments   Date Time Provider Vernon Lawson   3/10/2022  3:30 PM Elijah Loredo, PTA [facility]   3/15/2022  5:00 PM Fabián French, PT [facility]   3/17/2022  5:00 PM Elijah Loredo, PTA [facility]   3/21/2022  5:00 PM Elijah Loredo, PTA [facility]   3/24/2022  5:00 PM Elijah Loredo, PTA [facility]   3/28/2022  5:00 PM Elijah Hazy, PTA [facility]   3/31/2022  5:00 PM Elijah Loredo, PTA [facility]   4/18/2022  1:20 PM Ramya Dorantes MD [facility] AM[facility]

## 2022-03-14 ENCOUNTER — DOCUMENTATION ONLY (OUTPATIENT)
Dept: ORTHOPEDIC SURGERY | Age: 62
End: 2022-03-14

## 2022-03-14 NOTE — PROGRESS NOTES
[employer] form completed, faxed with notes, copy to scanning with confirmation, available for  at [facility] location.

## 2022-03-15 ENCOUNTER — HOSPITAL ENCOUNTER (OUTPATIENT)
Dept: PHYSICAL THERAPY | Age: 62
Discharge: HOME | End: 2022-03-15
Attending: ORTHOPAEDIC SURGERY
Payer: COMMERCIAL

## 2022-03-15 PROCEDURE — 97110 THERAPEUTIC EXERCISES: CPT | Performed by: PHYSICAL THERAPIST

## 2022-03-15 PROCEDURE — 97112 NEUROMUSCULAR REEDUCATION: CPT | Performed by: PHYSICAL THERAPIST

## 2022-03-15 NOTE — PROGRESS NOTES
PT DAILY TREATMENT NOTE     Patient Name: Nancie Olivier  Date:3/15/2022  : 1960  [x]  Patient  Verified  Payor: Enma Erp / Plan: [place] [employer] PPO / Product Type: PPO /    In time:4:56  Out time:5:35  Total Treatment Time (min): 39  Visit #: 2 of 8    Treatment Area: Right shoulder pain [M25.511]    SUBJECTIVE  Pain Level (0-10 scale): 0  Any medication changes, allergies to medications, adverse drug reactions, diagnosis change, or new procedure performed?: [x] No    [] Yes (see summary sheet for update)  Subjective functional status/changes:   [] No changes reported  Patient notes she is on light duty, does not know if she has a 5# or 10# limit. She notes discomfort with IR and with elevation. OBJECTIVE    23 min Therapeutic Exercise:  [x]????????????? See flow sheet :   Rationale: increase ROM, increase strength and improve coordination to improve the patients ability to increase ease with ADLS.       16 min Neuromuscular Re-education:  [x]??????????  See flow sheet :scapular stability    Rationale: increase ROM, increase strength, improve coordination, improve balance and increase proprioception  to improve the patients ability to increase ease with overhead reach.            With   [] TE   [] TA   [] neuro   [] other: Patient Education: [x] Review HEP    [] Progressed/Changed HEP based on:   [] positioning   [] body mechanics   [] transfers   [] heat/ice application    [] other:      Other Objective/Functional Measures: Able to IR to place the back of her hand at mid L/S. Pain Level (0-10 scale) post treatment: 0    ASSESSMENT/Changes in Function: Patient with improved IR right shoulder. Functionally, she is still limited with prolonged overhead activity.     Patient will continue to benefit from skilled PT services to modify and progress therapeutic interventions, address functional mobility deficits, address ROM deficits, address strength deficits, analyze and address soft tissue restrictions and analyze and cue movement patterns to attain remaining goals. [x]  See Plan of Care  []  See progress note/recertification  []  See Discharge Summary         Progress towards goals / Updated goals:  Updated Goals: to be achieved in 4 weeks:  1. Patient will perform functionals reach of ER T1 and IR to L1 to increase ease with styling hair and donning belts. AT PN:  Progressing - functional reach ER =T1, IR = sacrum   Current:   Able to IR to place the back of her hand at mid L/S. 3/15/2022. Progressing. 2.Pt will increase FOTO score 64 points to improve ability to perform ADLs. AT PN:Progressing: FOTO = 50  3. Patient will be able to perform overhead activity for 45 seconds to increase ease with return back to work. AT PN: Overhead activity x 20 sec  Current:  Able to perform static ball toss,overhead for 30 seconds x 2.  3/15/2022. Progressing.     PLAN  [x]  Upgrade activities as tolerated     [x]  Continue plan of care  []  Update interventions per flow sheet       []  Discharge due to:_  []  Other:_      Marlys Messina, PT 3/15/2022  5:26 PM    Future Appointments   Date Time Provider Vernon Lawson   3/17/2022  5:00 PM Deondre Raza PTA [facility]   3/21/2022  5:00 PM Deondre Raza, PTA [facility]   3/24/2022  5:00 PM Deondre Raza PTA [facility]   3/28/2022  5:00 PM Deondre Raza PTA [facility]   3/31/2022  5:00 PM Deondre Raza PTA [facility]   4/18/2022  1:20 PM Regis Barthel, MD [facility] BS AM[facility]

## 2022-03-17 ENCOUNTER — HOSPITAL ENCOUNTER (OUTPATIENT)
Dept: PHYSICAL THERAPY | Age: 62
End: 2022-03-17
Attending: ORTHOPAEDIC SURGERY
Payer: COMMERCIAL

## 2022-03-17 ENCOUNTER — TELEPHONE (OUTPATIENT)
Dept: PHYSICAL THERAPY | Age: 62
End: 2022-03-17

## 2022-03-21 ENCOUNTER — HOSPITAL ENCOUNTER (OUTPATIENT)
Dept: PHYSICAL THERAPY | Age: 62
Discharge: HOME | End: 2022-03-21
Attending: ORTHOPAEDIC SURGERY
Payer: COMMERCIAL

## 2022-03-21 PROCEDURE — 97112 NEUROMUSCULAR REEDUCATION: CPT | Performed by: PHYSICAL THERAPIST

## 2022-03-21 PROCEDURE — 97110 THERAPEUTIC EXERCISES: CPT | Performed by: PHYSICAL THERAPIST

## 2022-03-21 NOTE — PROGRESS NOTES
PT DAILY TREATMENT NOTE     Patient Name: Amilcar Lacey  Date:3/21/2022  : 1960  [x]  Patient  Verified  Payor: Caron Edge / Plan: [place] [employer] PPO / Product Type: PPO /    In time:5:03  Out time:5:42  Total Treatment Time (min): 39  Visit #: 3 of 8    Treatment Area: Right shoulder pain [M25.511]    SUBJECTIVE  Pain Level (0-10 scale): 0  Any medication changes, allergies to medications, adverse drug reactions, diagnosis change, or new procedure performed?: [x] No    [] Yes (see summary sheet for update)  Subjective functional status/changes:   [] No changes reported  Patient reports her shoulder has been feeling good. Using her right arm more. Avoids lifting anything heavy. Continuing her HEP and is not \"taking anything for it\". OBJECTIVE    23 min Therapeutic Exercise:  [x]?????????????? See flow sheet :   Rationale: increase ROM, increase strength and improve coordination to improve the patients ability to increase ease with ADLS.       16 min Neuromuscular Re-education:  [x]???????????  See flow sheet :scapular stability    Rationale: increase ROM, increase strength, improve coordination, improve balance and increase proprioception  to improve the patients ability to increase ease with overhead reach.           With   [] TE   [] TA   [] neuro   [] other: Patient Education: [x] Review HEP    [] Progressed/Changed HEP based on:   [] positioning   [] body mechanics   [] transfers   [] heat/ice application    [] other:      Other Objective/Functional Measures: functional IR to L2. Pain Level (0-10 scale) post treatment: 0    ASSESSMENT/Changes in Function: Patient with improved functional mobility with decreased pain.       Patient will continue to benefit from skilled PT services to modify and progress therapeutic interventions, address functional mobility deficits, address ROM deficits, address strength deficits, analyze and address soft tissue restrictions and analyze and cue movement Writer spoke with Monique Bocanegra NP and she states that they will put patient on 60 mg Strattera in the AM and 25 mg in the afternoon due to insurance.  Writer contacted patients mother and informed her of the same.  She will contact us when they are almost out of the current medication that they have.   patterns to attain remaining goals. [x]  See Plan of Care  []  See progress note/recertification  []  See Discharge Summary         Progress towards goals / Updated goals:  Updated Goals: to be achieved in 4 weeks:  1. Patient will perform functionals reach of ER T1 and IR to L1 to increase ease with styling hair and donning belts. AT PN:  Progressing - functional reach ER =T1, IR = sacrum   Current:   Able to IR to place the back of her hand at mid L/S.   3/15/2022. Progressing. 2.Pt will increase FOTO score 64 points to improve ability to perform ADLs. AT PN:Progressing: FOTO = 50  3. Patient will be able to perform overhead activity for 45 seconds to increase ease with return back to work. AT PN: Overhead activity x 20 sec  Current:  Able to perform static ball toss,overhead for 30 seconds x 2.  3/15/2022. Progressing.     PLAN  [x]  Upgrade activities as tolerated     [x]  Continue plan of care  []  Update interventions per flow sheet       []  Discharge due to:_  []  Other:_      Flex Scales, PT 3/21/2022  5:07 PM    Future Appointments   Date Time Provider Vernon Lawson   3/24/2022  5:00 PM Chidi Jane [facility]   3/28/2022  5:00 PM Marco Antonio Oleary PTA [facility]   3/31/2022  5:00 PM Marco Antonio Oleary PTA [facility]   4/18/2022  1:20 PM Gino Jack MD [facility]

## 2022-03-24 ENCOUNTER — TELEPHONE (OUTPATIENT)
Dept: PHYSICAL THERAPY | Age: 62
End: 2022-03-24

## 2022-03-24 ENCOUNTER — APPOINTMENT (OUTPATIENT)
Dept: PHYSICAL THERAPY | Age: 62
End: 2022-03-24
Attending: ORTHOPAEDIC SURGERY
Payer: COMMERCIAL

## 2022-03-28 ENCOUNTER — HOSPITAL ENCOUNTER (OUTPATIENT)
Dept: PHYSICAL THERAPY | Age: 62
Discharge: HOME | End: 2022-03-28
Attending: ORTHOPAEDIC SURGERY
Payer: COMMERCIAL

## 2022-03-28 PROCEDURE — 97110 THERAPEUTIC EXERCISES: CPT | Performed by: PHYSICAL THERAPIST

## 2022-03-28 PROCEDURE — 97112 NEUROMUSCULAR REEDUCATION: CPT | Performed by: PHYSICAL THERAPIST

## 2022-03-28 NOTE — PROGRESS NOTES
PT DAILY TREATMENT NOTE     Patient Name: Willie Russo  Date:3/28/2022  : 1960  [x]  Patient  Verified  Payor: Lita Cruz / [place]: [place] [employer] / Product Type: PPO /    In time:5:03  Out time:5:37  Total Treatment Time (min): 34  Visit #: 4 of 8    Treatment Area: Right shoulder pain [M25.511]    SUBJECTIVE  Pain Level (0-10 scale): 4  Any medication changes, allergies to medications, adverse drug reactions, diagnosis change, or new procedure performed?: [x] No    [] Yes (see summary sheet for update)  Subjective functional status/changes:   [] No changes reported  Patient with c/o soreness today and questions if it is the colder weather today. OBJECTIVE    23 min Therapeutic Exercise:  [x]??????????????? See flow sheet :   Rationale: increase ROM, increase strength and improve coordination to improve the patients ability to increase ease with ADLS.       11 min Neuromuscular Re-education:  [x]????????????  See flow sheet :scapular stability    Rationale: increase ROM, increase strength, improve coordination, improve balance and increase proprioception  to improve the patients ability to increase ease with overhead reach.             With   [] TE   [] TA   [] neuro   [] other: Patient Education: [x] Review HEP    [] Progressed/Changed HEP based on:   [] positioning   [] body mechanics   [] transfers   [] heat/ice application    [] other:      Other Objective/Functional Measures: Patient was able to perform continuous overhead activity for 45 seconds x 2. Pain Level (0-10 scale) post treatment: 4    ASSESSMENT/Changes in Function: Patient with increased soreness today. No change in her discomfort level following her treatment session.     Patient will continue to benefit from skilled PT services to modify and progress therapeutic interventions, address functional mobility deficits, address ROM deficits, address strength deficits, analyze and address soft tissue restrictions and analyze and cue movement patterns to attain remaining goals. [x]  See Plan of Care  []  See progress note/recertification  []  See Discharge Summary         Progress towards goals / Updated goals:  Updated Goals: to be achieved in 4 weeks:  1. Patient will perform functionals reach of ER T1 and IR to L1 to increase ease with styling hair and donning belts. AT PN:  Progressing - functional reach ER =T1, IR = sacrum   Current:   Able to IR to place the back of her hand at mid L/S.   3/15/2022.  Progressing. 2.Pt will increase FOTO score 64 points to improve ability to perform ADLs. AT PN:Progressing: FOTO = 50  3. Patient will be able to perform overhead activity for 45 seconds to increase ease with return back to work. AT PN: Overhead activity x 20 sec  Current:  Able to perform static ball toss,overhead for 45 seconds x 2.  3/28/2022.  Goal Met.     PLAN  [x]  Upgrade activities as tolerated     [x]  Continue plan of care  []  Update interventions per flow sheet       []  Discharge due to:_  []  Other:_      Lizeth Wisdom, PT 3/28/2022  5:03 PM    Future Appointments   Date Time Provider Vernon Fontanezi   3/31/2022  5:00 PM Tracy Conteh [facility]   4/18/2022  1:20 PM Hero Calloway MD [facility] BS AMB

## 2022-03-29 ENCOUNTER — TELEPHONE (OUTPATIENT)
Dept: PHYSICAL THERAPY | Age: 62
End: 2022-03-29

## 2022-03-31 ENCOUNTER — APPOINTMENT (OUTPATIENT)
Dept: PHYSICAL THERAPY | Age: 62
End: 2022-03-31
Attending: ORTHOPAEDIC SURGERY
Payer: COMMERCIAL

## 2022-03-31 LAB
25(OH)D3 SERPL-MCNC: 37.9 NG/ML (ref 32–100)
A-G RATIO,AGRAT: 1.6 RATIO (ref 1.1–2.6)
ALBUMIN SERPL-MCNC: 4.2 G/DL (ref 3.5–5)
ALP SERPL-CCNC: 83 U/L (ref 40–120)
ALT SERPL-CCNC: 9 U/L (ref 5–40)
ANION GAP SERPL CALC-SCNC: 12 MMOL/L (ref 3–15)
AST SERPL W P-5'-P-CCNC: 20 U/L (ref 10–37)
BILIRUB SERPL-MCNC: 0.3 MG/DL (ref 0.2–1.2)
BUN SERPL-MCNC: 15 MG/DL (ref 6–22)
CALCIUM SERPL-MCNC: 9.4 MG/DL (ref 8.4–10.5)
CHLORIDE SERPL-SCNC: 101 MMOL/L (ref 98–110)
CHOLEST SERPL-MCNC: 252 MG/DL (ref 110–200)
CO2 SERPL-SCNC: 28 MMOL/L (ref 20–32)
CREAT SERPL-MCNC: 0.5 MG/DL (ref 0.8–1.4)
FE % SATURATION,PSAT: 25 % (ref 20–50)
FOLATE,FOL: 15.8 NG/ML
GFRAA, 66117: >60
GFRNA, 66118: >60
GLOBULIN,GLOB: 2.6 G/DL (ref 2–4)
GLUCOSE SERPL-MCNC: 80 MG/DL (ref 70–99)
HDLC SERPL-MCNC: 2.5 MG/DL (ref 0–5)
HDLC SERPL-MCNC: 99 MG/DL
IRON,IRN: 89 MCG/DL (ref 30–160)
LDL/HDL RATIO,LDHD: 1.4
LDLC SERPL CALC-MCNC: 134 MG/DL (ref 50–99)
NON-HDL CHOLESTEROL, 011976: 153 MG/DL
POTASSIUM SERPL-SCNC: 3.8 MMOL/L (ref 3.5–5.5)
PROT SERPL-MCNC: 6.8 G/DL (ref 6.2–8.1)
SODIUM SERPL-SCNC: 141 MMOL/L (ref 133–145)
TIBC,TIBC: 359 MCG/DL (ref 228–428)
TRIGL SERPL-MCNC: 96 MG/DL (ref 40–149)
UIBC SERPL-MCNC: 270 MCG/DL (ref 110–370)
VIT B12 SERPL-MCNC: >2000 PG/ML (ref 211–911)
VITAMIN B1, WHOLE BLOOD, 66250: 98.2 NMOL/L (ref 66.5–200)
VLDLC SERPL CALC-MCNC: 19 MG/DL (ref 8–30)

## 2022-04-05 ENCOUNTER — HOSPITAL ENCOUNTER (OUTPATIENT)
Dept: PHYSICAL THERAPY | Age: 62
Discharge: HOME | End: 2022-04-05
Attending: ORTHOPAEDIC SURGERY
Payer: COMMERCIAL

## 2022-04-05 PROCEDURE — 97110 THERAPEUTIC EXERCISES: CPT

## 2022-04-05 PROCEDURE — 97112 NEUROMUSCULAR REEDUCATION: CPT

## 2022-04-05 NOTE — PROGRESS NOTES
[facility]              [address]        [place], [address] [place]   [phone] fax    Progress Note  Patient name: Sara Gimenez Start of Care: 2021   Referral source: Radha Koo,* : 1960   Medical/Treatment Diagnosis: Right shoulder pain [M25.511]  Payor: Rosario Vu / [place]: [place] [employer] / Product Type: PPO /  Onset Date:  DoS 2021     Prior Hospitalization: see medical history Provider#: [id number]   Medications: Verified on Patient Summary List    Comorbidities: HTN  Prior Level of Function:Independent with ADLs, functional, and work tasks with pain in the right shoulder. Visits from Start of Care: 22    Missed Visits: 3    Established Goals:      Updated Goals: to be achieved in 4 weeks:  1. Patient will perform functionals reach of ER T1 and IR to L1 to increase ease with styling hair and donning belts. At Last PN:  Progressing - functional reach ER =T1, IR = sacrum   Current:  Progressing, Right Shoulder Functional ER = C4, Right Shoulder Functional IR = L3  2. Pt will increase FOTO score 64 points to improve ability to perform ADLs. At Last PN:Progressing: FOTO = 50  Current: Progressing, FOTO = 51  3. Patient will be able to perform overhead activity for 45 seconds to increase ease with return back to work. At Last PN: Overhead activity x 20 sec  Current: Met, Able to perform static ball toss,overhead for 45 seconds x 2    Key Functional Changes: See goals above. Updated Goals: To be accomplished in 4 weeks  1. Patient will demonstrate supine right shoulder IR PROM (90 deg abd) >/= 45 degrees to improve ease with dressing. At PN: Supine Right Shoulder IR PROM (90 deg abd) = 30 deg  2. Patient will demonstrate right shoulder abduction AROM >/= 130 degrees to improve ease with overhead reach. At PN: Right Shoulder Abduction AROM = 100 deg  3.  Patient will demonstrate right shoulder flexion MMT 5/5 to improve ease with overhead reach. At PN: Right Shoulder Flexion MMT = 5/5  4. Patient will perform functionals reach of ER T1 and IR to L1 to increase ease with styling hair and donning belts. At PN:  Progressing, Right Shoulder Functional ER = C4, Right Shoulder Functional IR = L3  2. Pt will increase FOTO score 64 points to improve ability to perform ADLs. At PN: Progressing, FOTO = 51    ASSESSMENT/RECOMMENDATIONS:    Patient with significant objective improvement in available right shoulder AROM and strength but continues to demonstrate glenohumeral capsular hypomobility, most prominent upon assessment of the inferior and posterior capsule, with resultant continued post-surgical loss of shoulder mobility. Therapist with belief that patient would benefit from the continuation of physical therapy services with greater emphasis placed on normalization of glenohumeral capsular mobility to optimize return of functional right shoulder AROM with patient, secondary to continued mobility loss, with difficulty with performance of functional and work-related ADLs which require overhead reaching and reaching behind her back.     Patient will continue to benefit from skilled PT services to modify and progress therapeutic interventions, address functional mobility deficits, address ROM deficits, address strength deficits, analyze and address soft tissue restrictions, analyze and cue movement patterns, analyze and modify body mechanics/ergonomics, assess and modify postural abnormalities and instruct in home and community integration to attain remaining goals.     [x]Continue therapy per initial plan/protocol at a frequency of  2 x per week for 4 weeks  []Continue therapy with the following recommended changes:_____________________      _____________________________________________________________________  []Discontinue therapy progressing towards or have reached established goals  []Discontinue therapy due to lack of appreciable progress towards goals  []Discontinue therapy due to lack of attendance or compliance  []Await Physician's recommendations/decisions regarding therapy  []Other:________________________________________________________________    Thank you for this referral.    Jeannette Thomas, PT 4/5/2022 2:27 PM  NOTE TO PHYSICIAN:  PLEASE COMPLETE THE ORDERS BELOW AND   FAX TO [facility]: [phone]  If you are unable to process this request in 24 hours please contact our office: [phone]    []  I have read the above report and request that my patient continue as recommended. []  I have read the above report and request that my patient continue therapy with the following changes/special instructions:________________________________________  []I have read the above report and request that my patient be discharged from therapy.     [de-identified] Signature:____________Date:_________TIME:________     Johnna Bowman,*  ** Signature, Date and Time must be completed for valid certification **

## 2022-04-05 NOTE — PROGRESS NOTES
PT DAILY TREATMENT NOTE     Patient Name: Branden Valenzuela  Date:2022  : 1960  [x]  Patient  Verified  Payor: Pancho Baltazar / [place]: [place] [employer] [place] / Product Type: PPO /    In time:201  Out time:252  Total Treatment Time (min): 51  Visit #: 5 of 8    Treatment Area: Right shoulder pain [M25.511]    SUBJECTIVE  Pain Level (0-10 scale): 2  Any medication changes, allergies to medications, adverse drug reactions, diagnosis change, or new procedure performed?: [x] No    [] Yes (see summary sheet for update)  Subjective functional status/changes:   [] No changes reported  Patient reports continued difficulty with reaching behind her back. OBJECTIVE    26 min Therapeutic Exercise:  [x]? See flow sheet : Emphasis placed on improving available shoulder ROM and strength   Rationale: increase ROM and increase strength to improve the patients ability to improve functional activity tolerance.      25 min Neuromuscular Re-education:  [x]?   See flow sheet : Emphasis placed on improving activation and recruitment of the glenohumeral and scapulothoracic musculature and improving scapulohumeral rhythm with UE elevation   Rationale: increase strength and increase proprioception  to improve the patients ability to improve ease with self-care ADLs     With   [] TE   [] TA   [] neuro   [] other: Patient Education: [x] Review HEP    [] Progressed/Changed HEP based on:   [] positioning   [] body mechanics   [] transfers   [] heat/ice application    [] other:      Other Objective/Functional Measures:     Shoulder ROM:  [] Unable to assess at this time                                    AROM                                                MMT   Right  AROM (PROM)  Right   Flexion 135 (145) Flexion 4   Extension NT Extension 5   Abduction 100 (142) Abduction 4+   Functional ER C4   (65 @ 90 deg abd) ER @ 90 Degrees 5   Functional IR L3  (30 @ 90 deg abd) IR @ 90 Degrees 5       *Initiate manual therapy next treatment session with focus placed on glenohumeral mobilizations     Pain Level (0-10 scale) post treatment: 0    ASSESSMENT/Changes in Function: Patient with significant objective improvement in available right shoulder AROM and strength but continues to demonstrate glenohumeral capsular hypomobility, most prominent upon assessment of the inferior and posterior capsule, with resultant continued post-surgical loss of shoulder mobility. Therapist with belief that patient would benefit from the continuation of physical therapy services with greater emphasis placed on normalization of glenohumeral capsular mobility to optimize return of functional right shoulder AROM with patient, secondary to continued mobility loss, with difficulty with performance of functional and work-related ADLs which require overhead reaching and reaching behind her back. Patient will continue to benefit from skilled PT services to modify and progress therapeutic interventions, address functional mobility deficits, address ROM deficits, address strength deficits, analyze and address soft tissue restrictions, analyze and cue movement patterns, analyze and modify body mechanics/ergonomics, assess and modify postural abnormalities and instruct in home and community integration to attain remaining goals. []  See Plan of Care  [x]  See progress note/recertification  []  See Discharge Summary         Progress towards goals / Updated goals:    Updated Goals: to be achieved in 4 weeks:  1. Patient will perform functionals reach of ER T1 and IR to L1 to increase ease with styling hair and donning belts. AT PN:  Progressing - functional reach ER =T1, IR = sacrum   Current:  Progressing, Right Shoulder Functional ER = C4, Right Shoulder Functional IR = L3, 4/5/2022  2. Pt will increase FOTO score 64 points to improve ability to perform ADLs. AT PN:Progressing: FOTO = 50  Current: Progressing, FOTO = 51, 4/5/2022  3.  Patient will be able to perform overhead activity for 45 seconds to increase ease with return back to work. AT PN: Overhead activity x 20 sec  Current: Met, Able to perform static ball toss,overhead for 45 seconds x 2.  3/28/2022    Updated Goals: To be accomplished in 4 weeks  1. Patient will demonstrate supine right shoulder IR PROM (90 deg abd) >/= 45 degrees to improve ease with dressing. 4/5/2022: Supine Right Shoulder IR PROM (90 deg abd) = 30 deg  2. Patient will demonstrate right shoulder abduction AROM >/= 130 degrees to improve ease with overhead reach. 4/5/2022: Right Shoulder Abduction AROM = 100 deg  3. Patient will demonstrate right shoulder flexion MMT 5/5 to improve ease with overhead reach.   4/5/2022: Right Shoulder Flexion MMT = 5/5    PLAN  [x]  Upgrade activities as tolerated     [x]  Continue plan of care  []  Update interventions per flow sheet       []  Discharge due to:_  []  Other:_      Gabriel Mahmood, PT 4/5/2022  7:53 AM    Future Appointments   Date Time Provider Vernon Lawson   4/5/2022  2:00 PM Milady, [address] [facility]   4/18/2022  1:20 PM Radha Koo MD [facility] BS AMB

## 2022-04-11 ENCOUNTER — TELEPHONE (OUTPATIENT)
Dept: PHYSICAL THERAPY | Age: 62
End: 2022-04-11

## 2022-04-14 ENCOUNTER — HOSPITAL ENCOUNTER (OUTPATIENT)
Dept: PHYSICAL THERAPY | Age: 62
Discharge: HOME | End: 2022-04-14
Attending: ORTHOPAEDIC SURGERY
Payer: COMMERCIAL

## 2022-04-14 PROCEDURE — 97140 MANUAL THERAPY 1/> REGIONS: CPT | Performed by: PHYSICAL THERAPIST

## 2022-04-14 PROCEDURE — 97112 NEUROMUSCULAR REEDUCATION: CPT | Performed by: PHYSICAL THERAPIST

## 2022-04-14 PROCEDURE — 97110 THERAPEUTIC EXERCISES: CPT | Performed by: PHYSICAL THERAPIST

## 2022-04-14 NOTE — PROGRESS NOTES
PT DAILY TREATMENT NOTE     Patient Name: Aylin Norton  Date:2022  : 1960  [x]  Patient  Verified  Payor: Maeve Block / Plan: [place] [employer] / Product Type: PPO /    In time:3:25  Out time:4:10  Total Treatment Time (min): 45  Visit #: 1 of 8    Treatment Area: Right shoulder pain [M25.511]    SUBJECTIVE  Pain Level (0-10 scale): 4  Any medication changes, allergies to medications, adverse drug reactions, diagnosis change, or new procedure performed?: [x] No    [] Yes (see summary sheet for update)  Subjective functional status/changes:   [] No changes reported  Patient states she just got off work and notes she does not have pain just some soreness and discomfort. She is 23 weeks post op right RTC repair. OBJECTIVE      25 min Therapeutic Exercise:  [x]? ? See flow sheet : Emphasis placed on improving available shoulder ROM and strength   Rationale: increase ROM and increase strength to improve the patients ability to improve functional activity tolerance.      10 min Neuromuscular Re-education:  [x]? ?  See flow sheet : Emphasis placed on improving activation and recruitment of the glenohumeral and scapulothoracic musculature and improving scapulohumeral rhythm with UE elevation   Rationale: increase strength and increase proprioception  to improve the patients ability to improve ease with self-care ADLs    10 min Manual Therapy:  GH joint mobs, supine, grade II-III ant/post mobs to facilitate increasing IR. The manual therapy interventions were performed at a separate and distinct time from the therapeutic activities interventions. Rationale: increase ROM and increase tissue extensibility to increase ease of motion to improve function.           With   [] TE   [] TA   [] neuro   [] other: Patient Education: [x] Review HEP    [] Progressed/Changed HEP based on:   [] positioning   [] body mechanics   [] transfers   [] heat/ice application    [] other:      Other Objective/Functional Measures: PROM IR 0-45 deg (at 90 degrees abduction). Right Shoulder Abduction AROM 0-125 degrees     Pain Level (0-10 scale) post treatment: 0    ASSESSMENT/Changes in Function: Patient with increased AROM and PROM right shoulder. Patient will continue to benefit from skilled PT services to modify and progress therapeutic interventions, address functional mobility deficits, address ROM deficits, address strength deficits, analyze and address soft tissue restrictions, analyze and cue movement patterns, analyze and modify body mechanics/ergonomics, assess and modify postural abnormalities and instruct in home and community integration to attain remaining goals. [x]  See Plan of Care  []  See progress note/recertification  []  See Discharge Summary         Progress towards goals / Updated goals:  1. Patient will demonstrate supine right shoulder IR PROM (90 deg abd) >/= 45 degrees to improve ease with dressing. At PN: Supine Right Shoulder IR PROM (90 deg abd) = 30 deg  Current:  Right shoulder PROM IR 0-45 deg (at 90 degrees abduction). 4/14/2022. Goal Met.  2. Patient will demonstrate right shoulder abduction AROM >/= 130 degrees to improve ease with overhead reach. At PN: Right Shoulder Abduction AROM = 100 deg  Current:  Right Shoulder Abduction AROM 0-125 degrees. 4/14/2022. Progressing. 3. Patient will demonstrate right shoulder flexion MMT 5/5 to improve ease with overhead reach. At PN: Right Shoulder Flexion MMT = 5/5  4. Patient will perform functionals reach of ER T1 and IR to L1 to increase ease with styling hair and donning belts. At PN:  Progressing, Right Shoulder Functional ER = C4, Right Shoulder Functional IR = L3  2. Pt will increase FOTO score 64 points to improve ability to perform ADLs.   At PN: Perez Lefort = 51    PLAN  [x]  Upgrade activities as tolerated     [x]  Continue plan of care  []  Update interventions per flow sheet       []  Discharge due to:_  []  Other:_ Kira King, PT 4/14/2022  3:27 PM    Future Appointments   Date Time Provider Vernon Lulu   4/14/2022  3:30 PM Angelique Ruiz, PT [facility]   4/18/2022  1:20 PM Priyanka Michelle MD [facility] BS AMB   4/26/2022  1:45 PM Adalberto Mccabe MD [facility] BS AMB

## 2022-04-18 ENCOUNTER — TELEPHONE (OUTPATIENT)
Dept: PHYSICAL THERAPY | Age: 62
End: 2022-04-18

## 2022-04-18 ENCOUNTER — OFFICE VISIT (OUTPATIENT)
Dept: ORTHOPEDIC SURGERY | Age: 62
End: 2022-04-18
Payer: COMMERCIAL

## 2022-04-18 ENCOUNTER — APPOINTMENT (OUTPATIENT)
Dept: PHYSICAL THERAPY | Age: 62
End: 2022-04-18
Attending: ORTHOPAEDIC SURGERY
Payer: COMMERCIAL

## 2022-04-18 VITALS
WEIGHT: 165 LBS | BODY MASS INDEX: 29.23 KG/M2 | HEART RATE: 82 BPM | TEMPERATURE: 97.1 F | OXYGEN SATURATION: 97 % | HEIGHT: 63 IN

## 2022-04-18 DIAGNOSIS — M75.121 COMPLETE TEAR OF RIGHT ROTATOR CUFF, UNSPECIFIED WHETHER TRAUMATIC: Primary | ICD-10-CM

## 2022-04-18 PROCEDURE — 99213 OFFICE O/P EST LOW 20 MIN: CPT | Performed by: ORTHOPAEDIC SURGERY

## 2022-04-18 NOTE — PROGRESS NOTES
Thelma Triana  1960     HISTORY OF PRESENT ILLNESS  Thelma Triana is a 62 y.o. female who presents today for evaluation s/p Right shoulder arthroscopic rotator cuff repair on 11/01/2021. Patient has been going to PT. Describes pain as a 4/10. She has been going to PT and is overall improving. States stiffness has not improved. She notes pain with reaching behind her back and overhead reaching. 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 Patient denies any fever, chills, chest pain, shortness of breath or calf pain. There are no new illness or injuries to report since last seen in the office. PHYSICAL EXAM:   Visit Vitals  Pulse 82   Temp 97.1 °F (36.2 °C) (Temporal)   Ht 5' 3\" (1.6 m)   Wt 165 lb (74.8 kg)   LMP 12/31/1996   SpO2 97%   BMI 29.23 kg/m²      The patient is a well-developed, well-nourished female in no acute distress. The patient is alert and oriented times three. The patient appears to be well groomed. Mood and affect are normal.  ORTHOPEDIC EXAM of Right shoulder:  Inspection: swelling none  Incision, clean, healed  Passive glenohumeral abduction 0- 120 degrees  Stability: Stable  Strength: n/a  2+ distal pulses      IMPRESSION:   S/p Right shoulder arthroscopic rotator cuff repair       PLAN: 1. Patient presents today s/p right rotator cuff repair on 11/1/2021. She is overall improving and is doing well today. Continue with PT as directed. Return in 4 weeks. Risk factors include: BMI>30  2. No ultrasound exam indicated today  3. No cortisone injection indicated today   4. No Physical/Occupational Therapy indicated today  5. No diagnostic test indicated today:   6. No durable medical equipment indicated today  7. No referral indicated today   8. No medications indicated today:   9.  No Narcotic indicated today        RTC 4 weeks       Scribed by Emily Brito [facility]) as dictated by Sandee Tellez MD    I, Dr. Giovanni Torres Jesus, confirm that all documentation is accurate.     Tessie Rebollar M.D.   [facility]

## 2022-04-20 ENCOUNTER — HOSPITAL ENCOUNTER (OUTPATIENT)
Dept: PHYSICAL THERAPY | Age: 62
Discharge: HOME | End: 2022-04-20
Attending: ORTHOPAEDIC SURGERY
Payer: COMMERCIAL

## 2022-04-20 PROCEDURE — 97110 THERAPEUTIC EXERCISES: CPT

## 2022-04-20 PROCEDURE — 97140 MANUAL THERAPY 1/> REGIONS: CPT

## 2022-04-20 NOTE — PROGRESS NOTES
PT DAILY TREATMENT NOTE     Patient Name: Bobbi Pringle  Date:2022  : 1960  [x]  Patient  Verified  Payor: Dougie Crow / [place] / Product Type: PPO /    In time:455  Out time:539  Total Treatment Time (min): 44  Visit #: 2 of 8    Medicare/BCBS Only   Total Timed Codes (min):  44 1:1 Treatment Time:  44       Treatment Area: Right shoulder pain [M25.511]    SUBJECTIVE  Pain Level (0-10 scale): 0  Any medication changes, allergies to medications, adverse drug reactions, diagnosis change, or new procedure performed?: [x] No    [] Yes (see summary sheet for update)  Subjective functional status/changes:   [] No changes reported  Patient requests to modify the session this evening secondary to patient with a headache and not feeling well. OBJECTIVE    21 min Therapeutic Exercise:  [x]? ? See flow sheet : Emphasis placed on improving available shoulder ROM and strength   Rationale: increase ROM and increase strength to improve the patients ability to improve functional activity tolerance.      23 min Manual Therapy:    Supine, Right GH AP Grade II-III Mobilization (OPP, abd+IR)  Supine, Right GH Inferior Grade II-III Mobilization (OPP, inc deg abd)  Supine, Right Shoulder Passive Physiological Grade II-IV Mobilization - Flexion, Scaption, Abduction, ER (90 deg abd), IR (90 deg abd)   The manual therapy interventions were performed at a separate and distinct time from the therapeutic activities interventions.   Rationale: increase ROM and increase tissue extensibility to increase ease of motion to improve function.        With   [] TE   [] TA   [] neuro   [] other: Patient Education: [x] Review HEP    [] Progressed/Changed HEP based on:   [] positioning   [] body mechanics   [] transfers   [] heat/ice application    [] other:      Other Objective/Functional Measures:   Right Shoulder Functional ER = C4, Right Shoulder Functional IR = L3     Pain Level (0-10 scale) post treatment: 6    ASSESSMENT/Changes in Function: In accordance with patient request with primary focus of treatment placed on promotion of return of AROM/PROM, with patient requesting modification of session secondary to not feeling well. Right Shoulder PROM loss most significant in the directions of abduction and internal rotation. With PROM loss most significant in the directions of abduction and internal rotation. Patient will continue to benefit from skilled PT services to modify and progress therapeutic interventions, address functional mobility deficits, address ROM deficits, address strength deficits, analyze and address soft tissue restrictions, analyze and cue movement patterns, analyze and modify body mechanics/ergonomics, assess and modify postural abnormalities and instruct in home and community integration to attain remaining goals. []  See Plan of Care  []  See progress note/recertification  []  See Discharge Summary         Progress towards goals / Updated goals:    1. Patient will demonstrate supine right shoulder IR PROM (90 deg abd) >/= 45 degrees to improve ease with dressing. At PN: Supine Right Shoulder IR PROM (90 deg abd) = 30 deg  Current:  Right shoulder PROM IR 0-45 deg (at 90 degrees abduction). 4/14/2022. Goal Met.  2. Patient will demonstrate right shoulder abduction AROM >/= 130 degrees to improve ease with overhead reach. At PN: Right Shoulder Abduction AROM = 100 deg  Current:  Right Shoulder Abduction AROM 0-125 degrees. 4/14/2022. Progressing. 3. Patient will demonstrate right shoulder flexion MMT 5/5 to improve ease with overhead reach. At PN: Right Shoulder Flexion MMT = 5/5  4. Patient will perform functionals reach of ER T1 and IR to L1 to increase ease with styling hair and donning belts.   At PN:  Progressing, Right Shoulder Functional ER = C4, Right Shoulder Functional IR = L3  Current: Remains, Right Shoulder Functional ER = C4, Right Shoulder Functional IR = L3, 4/20/2022  2. Pt will increase FOTO score 64 points to improve ability to perform ADLs.   At PN: Carolene Runner = 51       PLAN  [x]  Upgrade activities as tolerated     [x]  Continue plan of care  []  Update interventions per flow sheet       []  Discharge due to:_  []  Other:_      Sarwat Hernandez, PT 4/20/2022  8:42 AM    Future Appointments   Date Time Provider Vernon Lawson   4/20/2022  5[facility]00 [facility]   4/25/2022  5:00 PM [facility]   4/26/2022  1:45 PM Dortha Severs, MD [facility] BS AMB   4/27/2022  3:30 [facility]   5/3/2022  3:30 PM [facility]   5/18/2022  1:00 PM Patito Carpenter MD [facility] BS AMB

## 2022-04-25 ENCOUNTER — HOSPITAL ENCOUNTER (OUTPATIENT)
Dept: PHYSICAL THERAPY | Age: 62
Discharge: HOME | End: 2022-04-25
Attending: ORTHOPAEDIC SURGERY
Payer: COMMERCIAL

## 2022-04-25 PROCEDURE — 97110 THERAPEUTIC EXERCISES: CPT

## 2022-04-25 PROCEDURE — 97140 MANUAL THERAPY 1/> REGIONS: CPT

## 2022-04-25 NOTE — PROGRESS NOTES
PT DAILY TREATMENT NOTE     Patient Name: Diana Hernandes  Date:2022  : 1960  [x]  Patient  Verified  Payor: Samm Lofton / [place] / Product Type: PPO /    In time:115  Out time:200  Total Treatment Time (min): 45  Visit #: 3 of 8    Medicare/BCBS Only   Total Timed Codes (min):   1:1 Treatment Time:         Treatment Area: Right shoulder pain [M25.511]    SUBJECTIVE  Pain Level (0-10 scale): 2  Any medication changes, allergies to medications, adverse drug reactions, diagnosis change, or new procedure performed?: [x] No    [] Yes (see summary sheet for update)  Subjective functional status/changes:   [] No changes reported  Patient reported improved pain and no HA today    OBJECTIVE      35 min Therapeutic Exercise:  [] See flow sheet :   Rationale: increase ROM and increase strength to improve the patients ability to perform ADLs      10 min Manual Therapy:    Supine, Right GH AP Grade II-III Mobilization (OPP, abd+IR)  Supine, Right GH Inferior Grade II-III Mobilization (OPP, inc deg abd)  Supine, Right Shoulder Passive Physiological Grade II-IV Mobilization - Flexion, Scaption, Abduction, ER (90 deg abd), IR (90 deg abd)      The manual therapy interventions were performed at a separate and distinct time from the therapeutic activities interventions.   Rationale: decrease pain, increase ROM, increase tissue extensibility and decrease trigger points to perform ADLs              With   [] TE   [] TA   [] neuro   [] other: Patient Education: [x] Review HEP    [] Progressed/Changed HEP based on:   [] positioning   [] body mechanics   [] transfers   [] heat/ice application    [] other:      Other Objective/Functional Measures:   Reintroduced therex per tolerance     Pain Level (0-10 scale) post treatment: 0    ASSESSMENT/Changes in Function: patient tolerated reintroduction of therex well without c/o pain post.    Patient will continue to benefit from skilled PT services to modify and progress therapeutic interventions, address functional mobility deficits, address ROM deficits, address strength deficits, analyze and address soft tissue restrictions and analyze and cue movement patterns to attain remaining goals. [x]  See Plan of Care  []  See progress note/recertification  []  See Discharge Summary         Progress towards goals / Updated goals:  1. Patient will demonstrate supine right shoulder IR PROM (90 deg abd) >/= 45 degrees to improve ease with dressing. At PN: Supine Right Shoulder IR PROM (90 deg abd) = 30 deg  Current:  Right shoulder PROM IR 0-45 deg (at 90 degrees abduction). 4/14/2022.  Goal Met.  2. Patient will demonstrate right shoulder abduction AROM >/= 130 degrees to improve ease with overhead reach. At PN: Right Shoulder Abduction AROM = 100 deg  Current:  Right Shoulder Abduction AROM 0-125 degrees.  4/14/2022.  Progressing. 3. Patient will demonstrate right shoulder flexion MMT 5/5 to improve ease with overhead reach. At PN: Right Shoulder Flexion MMT = 5/5  4. Patient will perform functionals reach of ER T1 and IR to L1 to increase ease with styling hair and donning belts. At PN:  Progressing, Right Shoulder Functional ER = C4, Right Shoulder Functional IR = L3  Current: Remains, Right Shoulder Functional ER = C4, Right Shoulder Functional IR = L3, 4/20/2022  2. Pt will increase FOTO score 64 points to improve ability to perform ADLs.   At PN: Progressing    PLAN  [x]  Upgrade activities as tolerated     [x]  Continue plan of care  []  Update interventions per flow sheet       []  Discharge due to:_  []  Other:_      Victorina Daniels, PTA 4/25/2022  1:25 PM    Future Appointments   Date Time Provider Vernon Lawson   4/26/2022  1:45 PM Destiny Barber MD [facility] BS AMB   4/27/2022  3:30 PM [facility]   5/3/2022  3:30 PM Cristina Luu [facility]   5/18/2022  1:00 PM Charbel Varner MD [facility] BS AMB

## 2022-04-26 ENCOUNTER — OFFICE VISIT (OUTPATIENT)
Dept: FAMILY MEDICINE CLINIC | Age: 62
End: 2022-04-26
Payer: COMMERCIAL

## 2022-04-26 DIAGNOSIS — J30.9 ALLERGIC RHINITIS, UNSPECIFIED SEASONALITY, UNSPECIFIED TRIGGER: ICD-10-CM

## 2022-04-26 DIAGNOSIS — E78.00 ELEVATED LDL CHOLESTEROL LEVEL: ICD-10-CM

## 2022-04-26 DIAGNOSIS — R06.00 DYSPNEA, UNSPECIFIED TYPE: Primary | ICD-10-CM

## 2022-04-26 DIAGNOSIS — L29.9 PRURITUS: ICD-10-CM

## 2022-04-26 DIAGNOSIS — Z98.84 S/P GASTRIC BYPASS: ICD-10-CM

## 2022-04-26 DIAGNOSIS — E65 ABDOMINAL PANNUS: ICD-10-CM

## 2022-04-26 DIAGNOSIS — R05.9 COUGH: ICD-10-CM

## 2022-04-26 PROCEDURE — 99214 OFFICE O/P EST MOD 30 MIN: CPT | Performed by: FAMILY MEDICINE

## 2022-04-26 RX ORDER — ATORVASTATIN CALCIUM 10 MG/1
10 TABLET, FILM COATED ORAL DAILY
Qty: 90 TABLET | Refills: 4 | Status: SHIPPED | OUTPATIENT
Start: 2022-04-26 | End: 2022-09-15 | Stop reason: SDUPTHER

## 2022-04-26 RX ORDER — ALBUTEROL SULFATE 90 UG/1
2 AEROSOL, METERED RESPIRATORY (INHALATION)
Qty: 3 EACH | Refills: 4 | Status: SHIPPED | OUTPATIENT
Start: 2022-04-26

## 2022-04-26 NOTE — PROGRESS NOTES
Millicent Schafer presents today for   Chief Complaint   Patient presents with    Rash     patient complains of rash on lower abdomen it is red and itch no discharge or drainage  , does not spread believe it is from skin over lapping has tried neosporin  with no relief . states the rash has been there for years but worsened   in the last few months . Millicent Schafer preferred language for health care discussion is english/other. Is someone accompanying this pt? No     Is the patient using any DME equipment during OV? No     Depression Screening:  3 most recent PHQ Screens 11/8/2021   Little interest or pleasure in doing things Not at all   Feeling down, depressed, irritable, or hopeless Not at all   Total Score PHQ 2 0       Learning Assessment:  Learning Assessment 2/19/2021   PRIMARY LEARNER Patient   HIGHEST LEVEL OF EDUCATION - PRIMARY LEARNER  SOME COLLEGE   BARRIERS PRIMARY LEARNER NONE   CO-LEARNER CAREGIVER No   PRIMARY LANGUAGE ENGLISH   LEARNER PREFERENCE PRIMARY READING     -     -     -     -   ANSWERED BY Patient   RELATIONSHIP SELF       Abuse Screening:  Abuse Screening Questionnaire 9/10/2021   Do you ever feel afraid of your partner? N   Are you in a relationship with someone who physically or mentally threatens you? N   Is it safe for you to go home? Y       Generalized Anxiety  MILLA 2/7 4/14/2021   Feeling nervous, anxious or on edge? 1   Not being able to stop or control worrying? 0   MILLA-2 Subtotal 1         Health Maintenance Due   Topic Date Due    DTaP/Tdap/Td series (1 - Tdap) Never done    Shingrix Vaccine Age 50> (1 of 2) Never done    COVID-19 Vaccine (3 - Booster for [employer] series) 07/17/2021    Breast Cancer Screen Mammogram  04/15/2022   . Health Maintenance reviewed and discussed and ordered per Provider. VACCINES DUE   SCREENINGS DUE       Millicent Schafer is updated on all HM    1. \"Have you been to the ER, urgent care clinic since your last visit? Hospitalized since your last visit? \" No    2. \"Have you seen or consulted any other health care providers outside of the [address] [place] [address] since your last visit? \" No     3. For patients aged 39-70: Has the patient had a colonoscopy / FIT/ Cologuard? Yes - no Care Gap present     If the patient is female:    4. For patients aged 41-77: Has the patient had a mammogram within the past 2 years? No - patient has appointment next patient     5. For patients aged 21-65: Has the patient had a pap smear?  Yes - no Care Gap present

## 2022-04-26 NOTE — PROGRESS NOTES
Chief Complaint   Patient presents with    Rash     patient complains of rash on lower abdomen it is red and itch no discharge or drainage  , does not spread believe it is from skin over lapping has tried neosporin  with no relief . states the rash has been there for years but worsened   in the last few months . Gaylia Cranker is a 62 y.o. female. HPI   Pt reports that her shoulder is cont to improve. She is able to reach overhead but still has difficulty with getting her arm behind her back. Pt has intermittent shortness of breath. She wonders if she may have had covid in Jan 2020 when she had pna and flu at the same time. Pt is taking zyrtec and using nasal spray to control her allergy sx. Pt reports itching of her lower abd. She has a pannus due to her wt loss after gastric bypass. It is uncomfortable at times when she is seated. She has had a rash in the past; she uses a massaging oil to keep the skin from getting dry so it won't be itchy. Pt has a mammogram scheduled soon. Review of Systems   Constitutional: Negative. HENT: Negative. Respiratory: Positive for shortness of breath. Cardiovascular: Negative. Gastrointestinal:        Pannus discomfort   Skin: Positive for itching. Negative for rash. All other systems reviewed and are negative. Objective  Physical Exam  Vitals and nursing note reviewed. Constitutional:       Appearance: Normal appearance. She is not ill-appearing. HENT:      Head: Normocephalic and atraumatic. Right Ear: External ear normal.      Left Ear: External ear normal.      Nose: Nose normal.      Mouth/Throat:      Mouth: Mucous membranes are moist.   Eyes:      Extraocular Movements: Extraocular movements intact. Conjunctiva/sclera: Conjunctivae normal.   Cardiovascular:      Rate and Rhythm: Normal rate and regular rhythm. Heart sounds: No murmur heard. No friction rub. No gallop.     Pulmonary: Effort: Pulmonary effort is normal.      Breath sounds: Normal breath sounds. No wheezing, rhonchi or rales. Abdominal:      Comments: Post-weight loss pannus noted   Musculoskeletal:         General: Normal range of motion. Cervical back: Normal range of motion. Skin:     General: Skin is warm and dry. Findings: No rash. Neurological:      Mental Status: She is alert and oriented to person, place, and time. Coordination: Coordination normal.   Psychiatric:         Mood and Affect: Mood normal.         Behavior: Behavior normal.         Thought Content: Thought content normal.         Judgment: Judgment normal.          Assessment & Plan  Diagnoses and all orders for this visit:    1. Dyspnea, unspecified type  -     REFERRAL TO PULMONARY DISEASE    2. Cough  -     albuterol (PROVENTIL HFA, VENTOLIN HFA, PROAIR HFA) 90 mcg/actuation inhaler; Take 2 Puffs by inhalation every four (4) hours as needed for Wheezing. 3. Allergic rhinitis, unspecified seasonality, unspecified trigger  Stable. Ok to try xyzal instead of zyrtec    4. Elevated LDL cholesterol level  -     atorvastatin (LIPITOR) 10 mg tablet; Take 1 Tablet by mouth daily.  -     METABOLIC PANEL, COMPREHENSIVE; Future  -     LIPID PANEL; Future    5. S/P gastric bypass  -     CBC WITH AUTOMATED DIFF; Future  -     VITAMIN B12 & FOLATE; Future  -     VITAMIN D, 25 HYDROXY; Future    6. Pruritus  Recommend trial of xyzal.  Ok to use topical benadryl prn    7. Abdominal pannus  Pt to research if her insurance would cover panniculectomy to remove redundant skin.     Follow-up and Dispositions    · Return in about 3 months (around 7/26/2022) for high cholesterol, lab review, f/u specialist Steph Coleman MD

## 2022-04-27 ENCOUNTER — HOSPITAL ENCOUNTER (OUTPATIENT)
Dept: PHYSICAL THERAPY | Age: 62
Discharge: HOME | End: 2022-04-27
Attending: ORTHOPAEDIC SURGERY
Payer: COMMERCIAL

## 2022-04-27 VITALS
RESPIRATION RATE: 16 BRPM | DIASTOLIC BLOOD PRESSURE: 69 MMHG | WEIGHT: 171 LBS | SYSTOLIC BLOOD PRESSURE: 117 MMHG | HEART RATE: 86 BPM | BODY MASS INDEX: 30.29 KG/M2 | OXYGEN SATURATION: 95 %

## 2022-04-27 PROCEDURE — 97110 THERAPEUTIC EXERCISES: CPT

## 2022-04-27 PROCEDURE — 97140 MANUAL THERAPY 1/> REGIONS: CPT

## 2022-04-27 NOTE — PROGRESS NOTES
PT DAILY TREATMENT NOTE     Patient Name: Darnell Taylor  Date:2022  : 1960  [x]  Patient  Verified  Payor: [employer] / Plan: [place] / Product Type: PPO /    In time:333  Out time:416  Total Treatment Time (min): 43  Visit #: 4 of 8    Treatment Area: Right shoulder pain [M25.511]    SUBJECTIVE  Pain Level (0-10 scale): 0/10  Any medication changes, allergies to medications, adverse drug reactions, diagnosis change, or new procedure performed?: [x] No    [] Yes (see summary sheet for update)  Subjective functional status/changes:   [] No changes reported  Pt states shoulder feels sore but reports no pain    OBJECTIVE    33 min Therapeutic Exercise:  [x] See flow sheet :   Rationale: increase ROM, increase strength and improve coordination to improve the patients ability to perform ADL's safely    10 min Manual Therapy:  STM/DTM UT, LS, medial scap border. PROM in all planes in supine   The manual therapy interventions were performed at a separate and distinct time from the therapeutic activities interventions. Rationale: decrease pain, increase ROM and increase tissue extensibility to perform ADL's pain free          With   [] TE   [] TA   [] neuro   [] other: Patient Education: [x] Review HEP    [] Progressed/Changed HEP based on:   [] positioning   [] body mechanics   [] transfers   [] heat/ice application    [] other:      Other Objective/Functional Measures: Added SB up wall stretch  Added ball circles on wall - CW and CCW     Pain Level (0-10 scale) post treatment: 0/10    ASSESSMENT/Changes in Function: Pt tolerated exercise progression well without increased pain or discomfort. Pt required verbal cues for proper form and mechanics with exercises.     Patient will continue to benefit from skilled PT services to modify and progress therapeutic interventions, address functional mobility deficits, address ROM deficits, address strength deficits, analyze and address soft tissue restrictions, analyze and cue movement patterns, analyze and modify body mechanics/ergonomics, assess and modify postural abnormalities, address imbalance/dizziness and instruct in home and community integration to attain remaining goals. []  See Plan of Care  []  See progress note/recertification  []  See Discharge Summary         Progress towards goals / Updated goals:  1. Patient will demonstrate supine right shoulder IR PROM (90 deg abd) >/= 45 degrees to improve ease with dressing. At PN: Supine Right Shoulder IR PROM (90 deg abd) = 30 deg  Current:  Right shoulder PROM IR 0-45 deg (at 90 degrees abduction). 4/14/2022.  Goal Met.  2. Patient will demonstrate right shoulder abduction AROM >/= 130 degrees to improve ease with overhead reach. At PN: Right Shoulder Abduction AROM = 100 deg  Current:  Right Shoulder Abduction AROM 0-125 degrees.  4/14/2022.  Progressing. 3. Patient will demonstrate right shoulder flexion MMT 5/5 to improve ease with overhead reach. At PN: Right Shoulder Flexion MMT = 5/5  4. Patient will perform functionals reach of ER T1 and IR to L1 to increase ease with styling hair and donning belts. At PN:  Progressing, Right Shoulder Functional ER = C4, Right Shoulder Functional IR = L3  Current: Remains, Right Shoulder Functional ER = C4, Right Shoulder Functional IR = L3, 4/20/2022  2. Pt will increase FOTO score 64 points to improve ability to perform ADLs.   At PN: Progressing    PLAN  []  Upgrade activities as tolerated     [x]  Continue plan of care  []  Update interventions per flow sheet       []  Discharge due to:_  []  Other:_      [place], EMMANUEL 4/27/2022  1:50 PM    Future Appointments   Date Time Provider Vernon Lawson   4/27/2022  3:30 PM Bronwyn Plaza [facility]   5/3/2022  3:30 PM Erica Luu [facility]   5/18/2022  1:00 PM Darcy Parker MD [facility] BS AMB   7/26/2022  1:00 PM Nessa Willard MD [facility] BS AMB

## 2022-05-03 ENCOUNTER — HOSPITAL ENCOUNTER (OUTPATIENT)
Dept: PHYSICAL THERAPY | Age: 62
End: 2022-05-03
Attending: ORTHOPAEDIC SURGERY

## 2022-05-04 ENCOUNTER — HOSPITAL ENCOUNTER (OUTPATIENT)
Dept: PHYSICAL THERAPY | Age: 62
End: 2022-05-04
Attending: ORTHOPAEDIC SURGERY

## 2022-05-04 ENCOUNTER — TELEPHONE (OUTPATIENT)
Dept: PHYSICAL THERAPY | Age: 62
End: 2022-05-04

## 2022-05-05 ENCOUNTER — VIRTUAL VISIT (OUTPATIENT)
Dept: FAMILY MEDICINE CLINIC | Age: 62
End: 2022-05-05
Payer: COMMERCIAL

## 2022-05-05 DIAGNOSIS — J01.00 ACUTE NON-RECURRENT MAXILLARY SINUSITIS: Primary | ICD-10-CM

## 2022-05-05 DIAGNOSIS — J30.9 ALLERGIC RHINITIS, UNSPECIFIED SEASONALITY, UNSPECIFIED TRIGGER: ICD-10-CM

## 2022-05-05 PROCEDURE — 99213 OFFICE O/P EST LOW 20 MIN: CPT | Performed by: NURSE PRACTITIONER

## 2022-05-05 RX ORDER — FLUTICASONE PROPIONATE 50 MCG
1 SPRAY, SUSPENSION (ML) NASAL DAILY
Qty: 1 EACH | Refills: 2 | Status: SHIPPED | OUTPATIENT
Start: 2022-05-05

## 2022-05-05 RX ORDER — AMOXICILLIN AND CLAVULANATE POTASSIUM 875; 125 MG/1; MG/1
1 TABLET, FILM COATED ORAL EVERY 12 HOURS
Qty: 20 TABLET | Refills: 0 | Status: SHIPPED | OUTPATIENT
Start: 2022-05-05 | End: 2022-05-15

## 2022-05-05 RX ORDER — CETIRIZINE HCL 10 MG
10 TABLET ORAL DAILY
Qty: 90 TABLET | Refills: 0 | Status: SHIPPED | OUTPATIENT
Start: 2022-05-05 | End: 2022-08-12 | Stop reason: SDUPTHER

## 2022-05-05 NOTE — PROGRESS NOTES
Bobbi Yary presents today for   Chief Complaint   Patient presents with    Nasal Congestion     Covid tested negative. Patient states that she been having sinus issues. headaches, nasal congestion, drainage (sufficient), coughing up mucus (greenish). Virtual/telephone visit    Depression Screening:  3 most recent PHQ Screens 11/8/2021   Little interest or pleasure in doing things Not at all   Feeling down, depressed, irritable, or hopeless Not at all   Total Score PHQ 2 0       Learning Assessment:  Learning Assessment 2/19/2021   PRIMARY LEARNER Patient   HIGHEST LEVEL OF EDUCATION - PRIMARY LEARNER  SOME COLLEGE   BARRIERS PRIMARY LEARNER NONE   CO-LEARNER CAREGIVER No   PRIMARY LANGUAGE ENGLISH   LEARNER PREFERENCE PRIMARY READING     -     -     -     -   ANSWERED BY Patient   RELATIONSHIP SELF       Fall Risk  Fall Risk Assessment, last 12 mths 1/22/2020   Able to walk? Yes   Fall in past 12 months? No       ADL  No flowsheet data found. Travel Screening:    Travel Screening     No screening recorded since 05/04/22 0000     Travel History   Travel since 04/05/22    No documented travel since 04/05/22         Health Maintenance reviewed and discussed and ordered per Provider. Health Maintenance Due   Topic Date Due    DTaP/Tdap/Td series (1 - Tdap) Never done    Shingrix Vaccine Age 50> (1 of 2) Never done    COVID-19 Vaccine (3 - Booster for [employer] series) 07/17/2021    Breast Cancer Screen Mammogram  04/15/2022   . Coordination of Care:    1. Have you been to the ER, urgent care clinic since your last visit? Hospitalized since your last visit? no    2. Have you seen or consulted any other health care providers outside of the [address] [place] [address] since your last visit? Include any pap smears or colon screening.  no

## 2022-05-05 NOTE — PROGRESS NOTES
Millicent Schafer is a 62 y.o. female who was seen by synchronous (real-time) audio-video technology on 5/5/2022 for Nasal Congestion (Covid tested negative. Patient states that she been having sinus issues. headaches, nasal congestion, drainage (sufficient), coughing up mucus (greenish). )    Assessment & Plan:     1. Acute non-recurrent maxillary sinusitis  Comments: Worsening, start abx since sxs has been for 10 days  Flonase for added sx relief, continue Zyrtec and Azelastine nasal spray  Prednisone if no improvement  Orders:  -     amoxicillin-clavulanate (AUGMENTIN) 875-125 mg per tablet; Take 1 Tablet by mouth every twelve (12) hours for 10 days. , Normal, Disp-20 Tablet, R-0  -     fluticasone propionate (FLONASE) 50 mcg/actuation nasal spray; 1 [place] by Both Nostrils route daily. 1 spray each nostril once a day., Normal, Disp-1 Each, R-2  2. Allergic rhinitis, unspecified seasonality, unspecified trigger  -     cetirizine (ZYRTEC) 10 mg tablet; Take 1 Tablet by mouth daily. Indications: inflammation of the nose due to an allergy, Normal, Disp-90 Tablet, R-0     Follow-up and Dispositions    · Return if symptoms worsen or fail to improve. SUBJECTIVE:     HPI    Sinusitis -  Onset: 10 days ago  Symptoms include nasal congestion, headaches, green mucus, nausea from post-nasal drip, sore throat  Has taken Zyrtec, Azelastine nasal spray, sudafed  COVID test negative this morning      Review of Systems   Constitutional: Negative for chills, fever and malaise/fatigue. HENT: Positive for congestion, sinus pain and sore throat. Respiratory: Negative for shortness of breath. Cardiovascular: Negative for chest pain. Gastrointestinal: Positive for nausea. Negative for diarrhea and vomiting. Musculoskeletal: Positive for myalgias. Neurological: Positive for headaches.      OBJECTIVE:     Physical Exam     Constitutional: Stable-appearing, in no distress, alert and oriented  HENT:   Head: Normocephalic and atraumatic. Ears:  Hearing grossly intact. Mouth:  No visible perioral lesions, cyanosis, or lip swelling. Pulmonary/Chest: Does not appear dyspneic, no audible wheezes or nasal flaring. Musculoskeletal: Grossly normal active ROM in upper extremities. Neurological:  Intact recent memory, no facial or eyelid drooping, no speech impairment, answering questions appropriately. Psychiatric: Judgment and insight good, normal mood and affect. Bobbi Azerbaijani, was evaluated through a synchronous (real-time) audio-video encounter. The patient (or guardian if applicable) is aware that this is a billable service, which includes applicable co-pays. Verbal consent to proceed has been obtained. The visit was conducted pursuant to the emergency declaration under the [address] [place] [address], [address] [place] [address] authority and the [employer] and [employer] Act. Patient identification was verified, and a caregiver was present when appropriate. The patient was located at home in a state where the provider was licensed to provide care. --Carolyn Mcgraw NP on 5/5/2022 at 1:51 PM    An electronic signature was used to authenticate this note.       JORGE ALBERTO Parra

## 2022-05-18 ENCOUNTER — OFFICE VISIT (OUTPATIENT)
Dept: ORTHOPEDIC SURGERY | Age: 62
End: 2022-05-18
Payer: COMMERCIAL

## 2022-05-18 VITALS — OXYGEN SATURATION: 99 % | HEIGHT: 63 IN | BODY MASS INDEX: 30.3 KG/M2 | WEIGHT: 171 LBS | HEART RATE: 97 BPM

## 2022-05-18 DIAGNOSIS — S46.011D TRAUMATIC COMPLETE TEAR OF RIGHT ROTATOR CUFF, SUBSEQUENT ENCOUNTER: Primary | ICD-10-CM

## 2022-05-18 PROCEDURE — 99213 OFFICE O/P EST LOW 20 MIN: CPT | Performed by: ORTHOPAEDIC SURGERY

## 2022-05-18 NOTE — LETTER
NOTIFICATION RETURN TO WORK / SCHOOL    5/18/2022 1:14 PM    Ms. Branden Valenzuela  [address] [place] [address] [address]      To Whom It May Concern:    Branden Valenzuela is currently under the care of [address] [place] [address] Eleazar Sood. She was evaluated in the office today and is cleared to return to work with no restrictions in one month. If there are questions or concerns please have the patient contact our office.         Sincerely,      Sarah Zaragoza MD

## 2022-05-18 NOTE — PROGRESS NOTES
Angel Hathaway  1960     HISTORY OF PRESENT ILLNESS  Angel Hathaway is a 62 y.o. female who presents today for evaluation s/p Right shoulder arthroscopic rotator cuff repair on 11/01/2021. Patient has been going to PT. About 2 weeks ago, she was at PT and noticed an increase of severe pain. The pain has improved since onset. She describes it as an aching pain that is intermittent. On her incision site, she feels as thought theres a \"pin\" sticking in the skin. Describes pain as a 4/10. 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 Patient denies any fever, chills, chest pain, shortness of breath or calf pain. There are no new illness or injuries to report since last seen in the office. PHYSICAL EXAM:   Visit Vitals  Pulse 97   Ht 5' 3\" (1.6 m)   Wt 171 lb (77.6 kg)   LMP 12/31/1996   SpO2 99%   BMI 30.29 kg/m²      The patient is a well-developed, well-nourished female in no acute distress. The patient is alert and oriented times three. The patient appears to be well groomed. Mood and affect are normal.  ORTHOPEDIC EXAM of Right shoulder:  Inspection: swelling none  Incision, clean, healed  Passive glenohumeral abduction 0- 120 degrees  Stability: Stable  Strength: n/a  2+ distal pulses      IMPRESSION:   S/p Right shoulder arthroscopic rotator cuff repair       PLAN: 1. Patient presents today s/p right rotator cuff repair on 11/1/2021. Her mobility and stiffness has improved since last OV. I would like her to start scar massage on her shoulder to help with tenderness around the incision site. Advised to transition to home exercise program. Advised to use pain as a guide and continue to modify activities. Provided a work note to return to full duty in one month. Risk factors include: BMI>30  2. No ultrasound exam indicated today  3. No cortisone injection indicated today   4. No Physical/Occupational Therapy indicated today  5.  No diagnostic test indicated today: 6. No durable medical equipment indicated today  7. No referral indicated today   8. No medications indicated today:   9. No Narcotic indicated today        RTC 4 weeks       Scribed by Carmencita Holliday [address] [place] [address] [address]) as dictated by Terry Yao MD    I, Dr. Terry Yao, confirm that all documentation is accurate.     Terry Yao M.D.   [facility]

## 2022-05-19 DIAGNOSIS — G47.00 INSOMNIA, UNSPECIFIED TYPE: ICD-10-CM

## 2022-05-19 DIAGNOSIS — N95.9 MENOPAUSAL DISORDER: ICD-10-CM

## 2022-05-19 RX ORDER — ESZOPICLONE 3 MG/1
3 TABLET, FILM COATED ORAL
Qty: 30 TABLET | Refills: 0 | Status: SHIPPED | OUTPATIENT
Start: 2022-05-19 | End: 2022-06-08 | Stop reason: SDUPTHER

## 2022-05-19 NOTE — TELEPHONE ENCOUNTER
Patient need a medication refill. Her mail away pharmacy is taking a long time to send her medication and she is almost out she wanted to know if she could get a refill until she received them from mail away. Requested Prescriptions     Pending Prescriptions Disp Refills    eszopiclone (LUNESTA) 3 mg tablet 90 Tablet 0     Sig: Take 1 Tablet by mouth nightly as needed for Sleep. Max Daily Amount: 3 mg.

## 2022-05-26 ENCOUNTER — DOCUMENTATION ONLY (OUTPATIENT)
Dept: PULMONOLOGY | Age: 62
End: 2022-05-26

## 2022-05-31 NOTE — TELEPHONE ENCOUNTER
Requested Prescriptions     Pending Prescriptions Disp Refills    conjugated estrogens (PREMARIN) 0.3 mg tablet 90 Tablet 1     Sig: Take 1 Tablet by mouth daily. Signed Prescriptions Disp Refills    eszopiclone (LUNESTA) 3 mg tablet 30 Tablet 0     Sig: Take 1 Tablet by mouth nightly as needed for Sleep. Max Daily Amount: 3 mg.      Authorizing Provider: Jennifer Pryor

## 2022-05-31 NOTE — TELEPHONE ENCOUNTER
----- Message from Hank Reddy sent at 5/31/2022 10:31 AM EDT -----  Subject: Medication Problem    QUESTIONS  Name of Medication? conjugated estrogens (PREMARIN) 0.3 mg tablet  Patient-reported dosage and instructions? once daily  What question or problem do you have with the medication? was told the   base is covering the script now and not [facility] and needs it   transferred over to the [facility] pharmacy. Preferred Pharmacy? [facility] [address], [address] [place] [address] phone number (if available)? [phone]  Additional Information for Provider?   ---------------------------------------------------------------------------  --------------  CALL BACK INFO  What is the best way for the office to contact you? OK to leave message on   voicemail  Preferred Call Back Phone Number? [phone]  ---------------------------------------------------------------------------  --------------  SCRIPT ANSWERS  Relationship to Patient?  Self

## 2022-06-07 NOTE — PROGRESS NOTES
[facility]              [address] [facility]        [place], [address]   [phone] fax    Discharge Summary  Patient name: Darnell Taylor Start of Care: 2022   Referral source: Amira Jesus,* : 1960   Medical/Treatment Diagnosis: Right shoulder pain [M25.511]  Payor: [employer] / Plan: [place] [employer] / Product Type: PPO /  Onset Date:2022     Prior Hospitalization: see medical history Provider#: [id number]   Medications: Verified on Patient Summary List    Comorbidities: HTN  Prior Level of Function:Independent with ADLs, functional, and work tasks with pain in the right shoulder. Visits from Start of Care: 26    Missed Visits: 5  Reporting Period : 2022 to 2022    Summary of Care:  1. Patient will demonstrate supine right shoulder IR PROM (90 deg abd) >/= 45 degrees to improve ease with dressing. At PN: Supine Right Shoulder IR PROM (90 deg abd) = 30 deg  Current:  Right shoulder PROM IR 0-45 deg (at 90 degrees abduction). 2022.  Goal Met.  2. Patient will demonstrate right shoulder abduction AROM >/= 130 degrees to improve ease with overhead reach. At PN: Right Shoulder Abduction AROM = 100 deg  Current:  Right Shoulder Abduction AROM 0-125 degrees.  2022.  Progressing. 3. Patient will demonstrate right shoulder flexion MMT 5/5 to improve ease with overhead reach. At PN: Right Shoulder Flexion MMT = 5/5  4. Patient will perform functionals reach of ER T1 and IR to L1 to increase ease with styling hair and donning belts. At PN:  Progressing, Right Shoulder Functional ER = C4, Right Shoulder Functional IR = L3  Current: Remains, Right Shoulder Functional ER = C4, Right Shoulder Functional IR = L3, 2022  2. Pt will increase FOTO score 64 points to improve ability to perform ADLs. At PN: Progressing    Unable to update goals, patient cancelled her appointments on 5/3 and .   She was seen by Dr. Bessie Holstein on 5/18/2022. Note indicates patient to \"transition to home exercise program\". We have attempted to contact this patient.     ASSESSMENT/RECOMMENDATIONS:  [x]Discontinue therapy: []Patient has reached or is progressing toward set goals      [x]Patient has abdicated      []Due to lack of appreciable progress towards set goals    Koreen Aase, PT 6/7/2022 8:40 AM

## 2022-06-08 DIAGNOSIS — G47.00 INSOMNIA, UNSPECIFIED TYPE: ICD-10-CM

## 2022-06-08 NOTE — TELEPHONE ENCOUNTER
Requested Prescriptions     Pending Prescriptions Disp Refills    eszopiclone (LUNESTA) 3 mg tablet 90 Tablet 0     Sig: Take 1 Tablet by mouth nightly as needed for Sleep. Max Daily Amount: 3 mg.

## 2022-06-10 RX ORDER — ESZOPICLONE 3 MG/1
3 TABLET, FILM COATED ORAL
Qty: 90 TABLET | Refills: 0 | Status: SHIPPED | OUTPATIENT
Start: 2022-06-10 | End: 2022-06-30 | Stop reason: SDUPTHER

## 2022-06-17 DIAGNOSIS — G47.00 INSOMNIA, UNSPECIFIED TYPE: ICD-10-CM

## 2022-06-17 RX ORDER — ESZOPICLONE 3 MG/1
3 TABLET, FILM COATED ORAL
Qty: 60 TABLET | Refills: 0 | OUTPATIENT
Start: 2022-06-17

## 2022-06-17 NOTE — TELEPHONE ENCOUNTER
----- Message from Shirlene Fulton sent at 6/17/2022  1:14 PM EDT -----  Subject: Refill Request    QUESTIONS  Name of Medication? eszopiclone (LUNESTA) 3 mg tablet  Patient-reported dosage and instructions? once day  How many days do you have left? 5  Preferred Pharmacy? [facility] [id number]  Pharmacy phone number (if available)? [phone]  Additional Information for Provider? Medication was refilled but only   given a 30 day supply She normal geets a 3 month supply Please advise if   she can get the reminder 2 months   ---------------------------------------------------------------------------  --------------  CALL BACK INFO  What is the best way for the office to contact you? OK to leave message on   voicemail  Preferred Call Back Phone Number? [phone]  ---------------------------------------------------------------------------  --------------  SCRIPT ANSWERS  Relationship to Patient?  Self

## 2022-06-20 DIAGNOSIS — G47.00 INSOMNIA, UNSPECIFIED TYPE: ICD-10-CM

## 2022-06-20 RX ORDER — ESZOPICLONE 3 MG/1
3 TABLET, FILM COATED ORAL
Qty: 90 TABLET | Refills: 0 | Status: CANCELLED | OUTPATIENT
Start: 2022-06-20

## 2022-06-20 NOTE — TELEPHONE ENCOUNTER
Patent can not use the mail away pharmacy and need her prescription sent over to the [facility]. Please adivse    Requested Prescriptions     Pending Prescriptions Disp Refills    eszopiclone (LUNESTA) 3 mg tablet 90 Tablet 0     Sig: Take 1 Tablet by mouth nightly as needed for Sleep. Max Daily Amount: 3 mg.

## 2022-06-30 DIAGNOSIS — G47.00 INSOMNIA, UNSPECIFIED TYPE: ICD-10-CM

## 2022-06-30 RX ORDER — ESZOPICLONE 3 MG/1
3 TABLET, FILM COATED ORAL
Qty: 90 TABLET | Refills: 0 | Status: SHIPPED | OUTPATIENT
Start: 2022-06-30 | End: 2022-09-15 | Stop reason: SDUPTHER

## 2022-06-30 NOTE — TELEPHONE ENCOUNTER
Patient need a medication refill. Please advise  She is out of her medication. Requested Prescriptions     Pending Prescriptions Disp Refills    eszopiclone (LUNESTA) 3 mg tablet 90 Tablet 0     Sig: Take 1 Tablet by mouth nightly as needed for Sleep. Max Daily Amount: 3 mg.

## 2022-07-07 DIAGNOSIS — N95.1 HOT FLASHES DUE TO MENOPAUSE: ICD-10-CM

## 2022-07-07 RX ORDER — VENLAFAXINE HYDROCHLORIDE 75 MG/1
75 CAPSULE, EXTENDED RELEASE ORAL DAILY
Qty: 90 CAPSULE | Refills: 1 | Status: SHIPPED | OUTPATIENT
Start: 2022-07-07 | End: 2022-10-07 | Stop reason: SDUPTHER

## 2022-08-12 DIAGNOSIS — J30.9 ALLERGIC RHINITIS, UNSPECIFIED SEASONALITY, UNSPECIFIED TRIGGER: ICD-10-CM

## 2022-08-12 NOTE — TELEPHONE ENCOUNTER
Patient called and needs medication refill. Please advise. Requested Prescriptions     Pending Prescriptions Disp Refills    cetirizine (ZYRTEC) 10 mg tablet 90 Tablet 0     Sig: Take 1 Tablet by mouth in the morning.  Indications: inflammation of the nose due to an allergy no

## 2022-08-12 NOTE — TELEPHONE ENCOUNTER
Requested Prescriptions     Pending Prescriptions Disp Refills    cetirizine (ZYRTEC) 10 mg tablet 90 Tablet 0     Sig: Take 1 Tablet by mouth in the morning.  Indications: inflammation of the nose due to an allergy     Last seen 5/5/22   Follow up 8/19/2

## 2022-08-15 DIAGNOSIS — J30.9 ALLERGIC RHINITIS, UNSPECIFIED SEASONALITY, UNSPECIFIED TRIGGER: ICD-10-CM

## 2022-08-15 RX ORDER — CETIRIZINE HCL 10 MG
10 TABLET ORAL DAILY
Qty: 90 TABLET | Refills: 0 | Status: SHIPPED | OUTPATIENT
Start: 2022-08-15 | End: 2022-09-28 | Stop reason: SDUPTHER

## 2022-08-15 RX ORDER — AZELASTINE 1 MG/ML
1 SPRAY, METERED NASAL 2 TIMES DAILY
Qty: 3 EACH | Refills: 4 | Status: SHIPPED | OUTPATIENT
Start: 2022-08-15

## 2022-08-15 NOTE — TELEPHONE ENCOUNTER
Requested Prescriptions     Pending Prescriptions Disp Refills    azelastine (ASTELIN) 137 mcg (0.1 %) nasal spray 3 Each 4     Si Spray by Both Nostrils route two (2) times a day.  Use in each nostril as directed         Last seen     Follow up 22   Last filled 22 qty 3 w/ 4 refills

## 2022-08-19 ENCOUNTER — OFFICE VISIT (OUTPATIENT)
Dept: FAMILY MEDICINE CLINIC | Age: 62
End: 2022-08-19
Payer: COMMERCIAL

## 2022-08-19 VITALS
RESPIRATION RATE: 16 BRPM | HEART RATE: 87 BPM | OXYGEN SATURATION: 98 % | SYSTOLIC BLOOD PRESSURE: 121 MMHG | TEMPERATURE: 98 F | BODY MASS INDEX: 30.47 KG/M2 | WEIGHT: 172 LBS | DIASTOLIC BLOOD PRESSURE: 76 MMHG

## 2022-08-19 DIAGNOSIS — M54.10 RADICULAR PAIN OF BOTH LOWER EXTREMITIES: ICD-10-CM

## 2022-08-19 DIAGNOSIS — G47.00 INSOMNIA, UNSPECIFIED TYPE: Primary | ICD-10-CM

## 2022-08-19 DIAGNOSIS — E78.00 ELEVATED LDL CHOLESTEROL LEVEL: ICD-10-CM

## 2022-08-19 DIAGNOSIS — I10 ESSENTIAL HYPERTENSION: ICD-10-CM

## 2022-08-19 PROCEDURE — 99214 OFFICE O/P EST MOD 30 MIN: CPT | Performed by: FAMILY MEDICINE

## 2022-08-19 NOTE — PROGRESS NOTES
Romy Dowell presents today for   Chief Complaint   Patient presents with    Labs    Cholesterol Problem    Other     Pulmonary eval - patient will see pulm 10/11/22        Romy Dowell preferred language for health care discussion is english/other. Is someone accompanying this pt? no    Is the patient using any DME equipment during [address]? No     Depression Screening:  3 most recent PHQ Screens 11/8/2021   Little interest or pleasure in doing things Not at all   Feeling down, depressed, irritable, or hopeless Not at all   Total Score PHQ 2 0       Learning Assessment:  Learning Assessment 2/19/2021   PRIMARY LEARNER Patient   HIGHEST LEVEL OF EDUCATION - PRIMARY LEARNER  SOME COLLEGE   BARRIERS PRIMARY LEARNER NONE   CO-LEARNER CAREGIVER No   PRIMARY LANGUAGE ENGLISH   LEARNER PREFERENCE PRIMARY READING     -     -     -     -   ANSWERED BY Patient   RELATIONSHIP SELF       Abuse Screening:  Abuse Screening Questionnaire 9/10/2021   Do you ever feel afraid of your partner? N   Are you in a relationship with someone who physically or mentally threatens you? N   Is it safe for you to go home? Y       Generalized Anxiety  MILLA 2/7 4/14/2021   Feeling nervous, anxious or on edge? 1   Not being able to stop or control worrying? 0   MILLA-2 Subtotal 1         Health Maintenance Due   Topic Date Due    DTaP/Tdap/Td series (1 - Tdap) Never done    Shingrix Vaccine Age 50> (1 of 2) Never done    COVID-19 Vaccine (3 - Booster for Pfizer series) 07/17/2021   . Health Maintenance reviewed and discussed and ordered per Provider. VACCINES DUE   SCREENINGS DUE       Romy Dowell is updated on all HM    1. \"Have you been to the ER, urgent care clinic since your last visit? Hospitalized since your last visit? \" No    2. \"Have you seen or consulted any other health care providers outside of the [address] [place] [address] since your last visit? \" No     3.  For patients aged 39-70: Has the patient had a colonoscopy / FIT/ Cologuard? Yes - no Care Gap present     If the patient is female:    4. For patients aged 41-77: Has the patient had a mammogram within the past 2 years? Yes - no Care Gap present    5. For patients aged 21-65: Has the patient had a pap smear?  Yes - no Care Gap present

## 2022-08-19 NOTE — PROGRESS NOTES
Chief Complaint   Patient presents with    Labs     Labs not done     Cholesterol Problem    Other     Pulmonary eval - patient will see pulm 10/11/22          HPI    Meghan Fink is a 62 y.o. female presenting today for 4 months  follow up of hld, insomnia. Pt was not able to tolerate her cholesterol med so she has stopped it. Labs not yet completed. Pt waiting for pulm eval in Oct.     Patient does not need medication refills today. New concerns today: pt c/o numbness in her lower legs when she sits on her bed with her legs extended. She is aware that she has a hx of arthritis in her lower back. Review of Systems   Constitutional: Negative. HENT: Negative. Respiratory: Negative. Cardiovascular: Negative. Neurological:  Positive for sensory change. All other systems reviewed and are negative. Physical Exam  Vitals and nursing note reviewed. Constitutional:       Appearance: Normal appearance. She is not ill-appearing. HENT:      Head: Normocephalic and atraumatic. Right Ear: External ear normal.      Left Ear: External ear normal.      Nose: Nose normal.      Mouth/Throat:      Mouth: Mucous membranes are moist.   Eyes:      Extraocular Movements: Extraocular movements intact. Conjunctiva/sclera: Conjunctivae normal.   Cardiovascular:      Rate and Rhythm: Normal rate and regular rhythm. Heart sounds: No murmur heard. No friction rub. No gallop. Pulmonary:      Effort: Pulmonary effort is normal.      Breath sounds: Normal breath sounds. No wheezing, rhonchi or rales. Musculoskeletal:         General: Normal range of motion. Cervical back: Normal range of motion. Skin:     General: Skin is warm and dry. Neurological:      Mental Status: She is alert and oriented to person, place, and time.       Coordination: Coordination normal.   Psychiatric:         Mood and Affect: Mood normal.         Behavior: Behavior normal.         Thought Content: Thought content normal.         Judgment: Judgment normal.       Diagnoses and all orders for this visit:    1. Insomnia, unspecified type  Stable, cont pres tx plan. 2. Essential hypertension  Stable, cont pres tx plan. 3. Elevated LDL cholesterol level  Stable, cont pres tx plan.      4. Radicular pain of both lower extremities  -     REFERRAL TO PHYSICIAL MEDICINE REHAB    Follow-up and Dispositions    Return in about 3 months (around 11/19/2022) for high cholesterol, insomnia, lab review, specialist eval.

## 2022-09-15 DIAGNOSIS — G47.00 INSOMNIA, UNSPECIFIED TYPE: ICD-10-CM

## 2022-09-15 DIAGNOSIS — J30.9 ALLERGIC RHINITIS, UNSPECIFIED SEASONALITY, UNSPECIFIED TRIGGER: ICD-10-CM

## 2022-09-15 DIAGNOSIS — E78.00 ELEVATED LDL CHOLESTEROL LEVEL: ICD-10-CM

## 2022-09-15 RX ORDER — CETIRIZINE HCL 10 MG
10 TABLET ORAL DAILY
Qty: 90 TABLET | Refills: 0 | Status: CANCELLED | OUTPATIENT
Start: 2022-09-15

## 2022-09-16 DIAGNOSIS — G47.00 INSOMNIA, UNSPECIFIED TYPE: ICD-10-CM

## 2022-09-16 RX ORDER — ATORVASTATIN CALCIUM 10 MG/1
10 TABLET, FILM COATED ORAL DAILY
Qty: 90 TABLET | Refills: 4 | Status: SHIPPED | OUTPATIENT
Start: 2022-09-16

## 2022-09-16 RX ORDER — ESZOPICLONE 3 MG/1
3 TABLET, FILM COATED ORAL
Qty: 90 TABLET | Refills: 0 | Status: SHIPPED | OUTPATIENT
Start: 2022-09-16

## 2022-09-16 NOTE — TELEPHONE ENCOUNTER
Requested Prescriptions     Pending Prescriptions Disp Refills    atorvastatin (LIPITOR) 10 mg tablet 90 Tablet 4     Sig: Take 1 Tablet by mouth daily. eszopiclone (LUNESTA) 3 mg tablet 90 Tablet 0     Sig: Take 1 Tablet by mouth nightly as needed for Sleep. Max Daily Amount: 3 mg.

## 2022-09-16 NOTE — TELEPHONE ENCOUNTER
----- Message from Chance Oh MD sent at 9/16/2022  3:50 PM EDT -----  Could not sent her lunesta to [facility]. Does she want it printed, to [facility], or to a retail pharmacy?

## 2022-09-18 ENCOUNTER — PATIENT MESSAGE (OUTPATIENT)
Dept: FAMILY MEDICINE CLINIC | Age: 62
End: 2022-09-18

## 2022-09-19 RX ORDER — ESZOPICLONE 3 MG/1
3 TABLET, FILM COATED ORAL
Qty: 90 TABLET | Refills: 0 | OUTPATIENT
Start: 2022-09-19

## 2022-09-19 NOTE — TELEPHONE ENCOUNTER
----- Message from Ben Figueredo sent at 9/18/2022  9:19 PM EDT -----  Regarding: Prescription for Lunesta  My lunesta prescription was suppose to be sent to [facility] in [place] in [facility].

## 2022-10-07 DIAGNOSIS — N95.1 HOT FLASHES DUE TO MENOPAUSE: ICD-10-CM

## 2022-10-07 RX ORDER — VENLAFAXINE HYDROCHLORIDE 75 MG/1
75 CAPSULE, EXTENDED RELEASE ORAL DAILY
Qty: 90 CAPSULE | Refills: 1 | Status: SHIPPED | OUTPATIENT
Start: 2022-10-07

## 2022-10-07 NOTE — TELEPHONE ENCOUNTER
Requested Prescriptions     Pending Prescriptions Disp Refills    venlafaxine-SR (EFFEXOR-XR) 75 mg capsule 90 Capsule 1     Sig: Take 1 Capsule by mouth daily.

## 2022-11-18 ENCOUNTER — OFFICE VISIT (OUTPATIENT)
Dept: PULMONOLOGY | Age: 62
End: 2022-11-18
Payer: COMMERCIAL

## 2022-11-18 VITALS — BODY MASS INDEX: 30.48 KG/M2 | WEIGHT: 172 LBS | HEIGHT: 63 IN

## 2022-11-18 DIAGNOSIS — R06.02 SHORTNESS OF BREATH: Primary | ICD-10-CM

## 2022-11-18 LAB
25(OH)D3 SERPL-MCNC: 36.8 NG/ML (ref 32–100)
A-G RATIO,AGRAT: 1.7 RATIO (ref 1.1–2.6)
ABSOLUTE LYMPHOCYTE COUNT, 10803: 2 K/UL (ref 1–4.8)
ALBUMIN SERPL-MCNC: 4.3 G/DL (ref 3.5–5)
ALP SERPL-CCNC: 101 U/L (ref 40–120)
ALT SERPL-CCNC: 11 U/L (ref 5–40)
ANION GAP SERPL CALC-SCNC: 11 MMOL/L (ref 3–15)
AST SERPL W P-5'-P-CCNC: 27 U/L (ref 10–37)
BASOPHILS # BLD: 0 K/UL (ref 0–0.2)
BASOPHILS NFR BLD: 1 % (ref 0–2)
BILIRUB SERPL-MCNC: 0.3 MG/DL (ref 0.2–1.2)
BUN SERPL-MCNC: 15 MG/DL (ref 6–22)
CALCIUM SERPL-MCNC: 9.4 MG/DL (ref 8.4–10.5)
CHLORIDE SERPL-SCNC: 101 MMOL/L (ref 98–110)
CHOLEST SERPL-MCNC: 207 MG/DL (ref 110–200)
CO2 SERPL-SCNC: 33 MMOL/L (ref 20–32)
CREAT SERPL-MCNC: 0.5 MG/DL (ref 0.8–1.4)
EOSINOPHIL # BLD: 0.1 K/UL (ref 0–0.5)
EOSINOPHIL NFR BLD: 2 % (ref 0–6)
ERYTHROCYTE [DISTWIDTH] IN BLOOD BY AUTOMATED COUNT: 14.3 % (ref 10–15.5)
FOLATE,FOL: 13.6 NG/ML
GLOBULIN,GLOB: 2.5 G/DL (ref 2–4)
GLOMERULAR FILTRATION RATE: >60 ML/MIN/1.73 SQ.M.
GLUCOSE SERPL-MCNC: 92 MG/DL (ref 70–99)
GRANULOCYTES,GRANS: 51 % (ref 40–75)
HCT VFR BLD AUTO: 36.4 % (ref 35.1–48)
HDLC SERPL-MCNC: 2.1 MG/DL (ref 0–5)
HDLC SERPL-MCNC: 97 MG/DL
HGB BLD-MCNC: 11.8 G/DL (ref 11.7–16)
LDL/HDL RATIO,LDHD: 0.9
LDLC SERPL CALC-MCNC: 92 MG/DL (ref 50–99)
LYMPHOCYTES, LYMLT: 38 % (ref 20–45)
MCH RBC QN AUTO: 30 PG (ref 26–34)
MCHC RBC AUTO-ENTMCNC: 32 G/DL (ref 31–36)
MCV RBC AUTO: 92 FL (ref 80–99)
MONOCYTES # BLD: 0.4 K/UL (ref 0.1–1)
MONOCYTES NFR BLD: 8 % (ref 3–12)
NEUTROPHILS # BLD AUTO: 2.6 K/UL (ref 1.8–7.7)
NON-HDL CHOLESTEROL, 011976: 110 MG/DL
PLATELET # BLD AUTO: 304 K/UL (ref 140–440)
PMV BLD AUTO: 12.1 FL (ref 9–13)
POTASSIUM SERPL-SCNC: 4 MMOL/L (ref 3.5–5.5)
PROT SERPL-MCNC: 6.8 G/DL (ref 6.2–8.1)
RBC # BLD AUTO: 3.96 M/UL (ref 3.8–5.2)
SODIUM SERPL-SCNC: 145 MMOL/L (ref 133–145)
TRIGL SERPL-MCNC: 90 MG/DL (ref 40–149)
VIT B12 SERPL-MCNC: >2000 PG/ML (ref 211–911)
VLDLC SERPL CALC-MCNC: 18 MG/DL (ref 8–30)
WBC # BLD AUTO: 5.1 K/UL (ref 4–11)

## 2022-11-18 PROCEDURE — 94727 GAS DIL/WSHOT DETER LNG VOL: CPT | Performed by: INTERNAL MEDICINE

## 2022-11-18 PROCEDURE — 94060 EVALUATION OF WHEEZING: CPT | Performed by: INTERNAL MEDICINE

## 2022-11-18 PROCEDURE — 94729 DIFFUSING CAPACITY: CPT | Performed by: INTERNAL MEDICINE

## 2022-11-21 ENCOUNTER — PATIENT MESSAGE (OUTPATIENT)
Dept: ORTHOPEDIC SURGERY | Age: 62
End: 2022-11-21

## 2022-11-22 ENCOUNTER — OFFICE VISIT (OUTPATIENT)
Dept: FAMILY MEDICINE CLINIC | Age: 62
End: 2022-11-22
Payer: COMMERCIAL

## 2022-11-22 VITALS
RESPIRATION RATE: 16 BRPM | OXYGEN SATURATION: 99 % | HEART RATE: 105 BPM | SYSTOLIC BLOOD PRESSURE: 133 MMHG | BODY MASS INDEX: 30.11 KG/M2 | DIASTOLIC BLOOD PRESSURE: 79 MMHG | WEIGHT: 170 LBS | TEMPERATURE: 97.6 F

## 2022-11-22 DIAGNOSIS — N95.9 MENOPAUSAL DISORDER: ICD-10-CM

## 2022-11-22 DIAGNOSIS — J01.40 ACUTE NON-RECURRENT PANSINUSITIS: ICD-10-CM

## 2022-11-22 DIAGNOSIS — J30.9 ALLERGIC RHINITIS, UNSPECIFIED SEASONALITY, UNSPECIFIED TRIGGER: ICD-10-CM

## 2022-11-22 DIAGNOSIS — N81.9 FEMALE GENITAL PROLAPSE, UNSPECIFIED TYPE: ICD-10-CM

## 2022-11-22 DIAGNOSIS — I10 ESSENTIAL HYPERTENSION: Primary | ICD-10-CM

## 2022-11-22 DIAGNOSIS — G47.00 INSOMNIA, UNSPECIFIED TYPE: ICD-10-CM

## 2022-11-22 DIAGNOSIS — L30.9 DERMATITIS: ICD-10-CM

## 2022-11-22 PROCEDURE — 3074F SYST BP LT 130 MM HG: CPT | Performed by: FAMILY MEDICINE

## 2022-11-22 PROCEDURE — 99215 OFFICE O/P EST HI 40 MIN: CPT | Performed by: FAMILY MEDICINE

## 2022-11-22 PROCEDURE — 3078F DIAST BP <80 MM HG: CPT | Performed by: FAMILY MEDICINE

## 2022-11-22 RX ORDER — MONTELUKAST SODIUM 10 MG/1
10 TABLET ORAL DAILY
Qty: 90 TABLET | Refills: 1 | Status: SHIPPED | OUTPATIENT
Start: 2022-11-22

## 2022-11-22 RX ORDER — AMOXICILLIN AND CLAVULANATE POTASSIUM 875; 125 MG/1; MG/1
1 TABLET, FILM COATED ORAL 2 TIMES DAILY
Qty: 20 TABLET | Refills: 0 | Status: SHIPPED | OUTPATIENT
Start: 2022-11-22 | End: 2022-12-02

## 2022-11-22 NOTE — PROGRESS NOTES
Chief Complaint   Patient presents with    Cholesterol Problem    Insomnia    Labs    Other      Patient has spot on her leg she would like you to check . She has tried multiple creams and this has not resolved . Allergic Rhinitis      Patient states that she has sinus pressure and pains  and congestion has taken multiple covid tests which are all negative . Urinary Pain     Patient complains of urinary frequency and  lower abdominal and back pain            HPI    Sonia Douglas is a 65 y.o. female presenting today for 3 months  follow up of hld, insomnia, [place]. Patient had labs on 11/18/22. Labs reviewed in detail with patient. Patient does need medication refills today. New concerns today: pt c/o hyperpigmented area on her L lower leg. She has tried multiple different topical meds. It will flare from time to time and feel very itchy. Pt c/o feeling full in her abdomen. She has some pain. She is not having dysuria but does have to urinate frequently. She is not certain that she is able to empty completely. Pt has hx of pelvic organ prolapse. Pt c/o ongoing sinus issues. She is taking zyrtec nightly, using astelin daily. She has nasal congestion. She is not having f/c. She does have ha with sinus pressure. Review of Systems   Constitutional:  Negative for chills and fever. HENT:  Positive for congestion and sinus pain. Genitourinary:  Positive for frequency. Abd fullness   Skin:  Positive for itching and rash. Neurological:  Positive for headaches. Physical Exam  Vitals and nursing note reviewed. Constitutional:       Appearance: Normal appearance. She is not ill-appearing. HENT:      Head: Normocephalic and atraumatic. Right Ear: Ear canal and external ear normal. A middle ear effusion is present. Tympanic membrane is not erythematous. Left Ear: Ear canal and external ear normal. A middle ear effusion is present.  Tympanic membrane is not erythematous. Nose:      Right Turbinates: Swollen. Not pale. Left Turbinates: Swollen. Not pale. Right Sinus: Maxillary sinus tenderness and frontal sinus tenderness present. Left Sinus: Maxillary sinus tenderness and frontal sinus tenderness present. Mouth/Throat:      Mouth: Mucous membranes are moist.      Pharynx: Oropharynx is clear. No pharyngeal swelling, oropharyngeal exudate or posterior oropharyngeal erythema. Eyes:      Extraocular Movements: Extraocular movements intact. Conjunctiva/sclera: Conjunctivae normal.   Cardiovascular:      Rate and Rhythm: Normal rate and regular rhythm. Heart sounds: No murmur heard. No friction rub. No gallop. Pulmonary:      Effort: Pulmonary effort is normal.      Breath sounds: Normal breath sounds. No wheezing, rhonchi or rales. Abdominal:      General: Bowel sounds are normal.      Palpations: Abdomen is soft. There is no mass. Tenderness: There is no abdominal tenderness. Musculoskeletal:         General: Normal range of motion. Cervical back: Normal range of motion. Skin:     General: Skin is warm and dry. Findings: Rash present. Neurological:      Mental Status: She is alert and oriented to person, place, and time. Coordination: Coordination normal.   Psychiatric:         Mood and Affect: Mood normal.         Behavior: Behavior normal.         Thought Content: Thought content normal.         Judgment: Judgment normal.       Diagnoses and all orders for this visit:    1. Essential hypertension  Stable, cont pres tx plan. 2. Insomnia, unspecified type  Stable, cont pres tx plan. 3. Allergic rhinitis, unspecified seasonality, unspecified trigger  -     montelukast (SINGULAIR) 10 mg tablet; Take 1 Tablet by mouth daily. 4. Acute non-recurrent pansinusitis  -     amoxicillin-clavulanate (AUGMENTIN) 875-125 mg per tablet; Take 1 Tablet by mouth two (2) times a day for 10 days.     5. Menopausal disorder  -     conjugated estrogens (PREMARIN) 0.3 mg tablet; Take 1 Tablet by mouth daily. 6. Dermatitis  -     REFERRAL TO DERMATOLOGY    7. Female genital prolapse, unspecified type  -     REFERRAL TO UROLOGY    Follow-up and Dispositions    Return in about 3 months (around 2/22/2023) for high blood pressure, insomnia, allergic rhinitis.

## 2022-11-22 NOTE — PROGRESS NOTES
Neville Owens presents today for   Chief Complaint   Patient presents with    Cholesterol Problem    Insomnia    Labs    Other      Patient has spot on her leg she would like you to check . She has tried multiple creams and this has not resolved . Allergic Rhinitis      Patient states that she has sinus pressure and pains  and congestion has taken multiple covid tests which are all negative . Urinary Pain     Patient complains of urinary frequency and  lower abdominal and back pain          Vitals:    11/22/22 1340   BP: 133/79   Pulse: (!) 105   Resp: 16   Temp: 97.6 °F (36.4 °C)   TempSrc: Oral   SpO2: 99%   Weight: 170 lb (77.1 kg)        Neville Owens preferred language for health care discussion is english/other. Is someone accompanying this pt? no    Is the patient using any DME equipment during [address]? no    Depression Screening:  3 most recent PHQ Screens 11/8/2021   Little interest or pleasure in doing things Not at all   Feeling down, depressed, irritable, or hopeless Not at all   Total Score PHQ 2 0       Learning Assessment:  Learning Assessment 2/19/2021   PRIMARY LEARNER Patient   HIGHEST LEVEL OF EDUCATION - PRIMARY LEARNER  SOME COLLEGE   BARRIERS PRIMARY LEARNER NONE   CO-LEARNER CAREGIVER No   PRIMARY LANGUAGE ENGLISH   LEARNER PREFERENCE PRIMARY READING     -     -     -     -   ANSWERED BY Patient   RELATIONSHIP SELF       Abuse Screening:  Abuse Screening Questionnaire 9/10/2021   Do you ever feel afraid of your partner? N   Are you in a relationship with someone who physically or mentally threatens you? N   Is it safe for you to go home? Y       Generalized Anxiety  MILLA 2/7 4/14/2021   Feeling nervous, anxious or on edge? 1   Not being able to stop or control worrying? 0   MILLA-2 Subtotal 1         Health Maintenance Due   Topic Date Due    DTaP/Tdap/Td series (1 - Tdap) Never done    Shingrix Vaccine Age 50> (1 of 2) Never done    Depression Screen  11/08/2022   .       Health Maintenance reviewed and discussed and ordered per Provider. VACCINES DUE   SCREENINGS DUE       Kashmir Raygoza is updated on all HM    1. \"Have you been to the ER, urgent care clinic since your last visit? Hospitalized since your last visit? \" No    2. \"Have you seen or consulted any other health care providers outside of the [address] [place] [address] since your last visit? \" No     3. For patients aged 39-70: Has the patient had a colonoscopy / FIT/ Cologuard? No     If the patient is female:    4. For patients aged 41-77: Has the patient had a mammogram within the past 2 years? Yes - no Care Gap present    5. For patients aged 21-65: Has the patient had a pap smear?  Yes - no Care Gap present

## 2022-11-30 ENCOUNTER — TELEPHONE (OUTPATIENT)
Dept: FAMILY MEDICINE CLINIC | Age: 62
End: 2022-11-30

## 2022-12-12 DIAGNOSIS — G47.00 INSOMNIA, UNSPECIFIED TYPE: ICD-10-CM

## 2022-12-12 DIAGNOSIS — I10 ESSENTIAL HYPERTENSION: ICD-10-CM

## 2022-12-13 ENCOUNTER — TELEPHONE (OUTPATIENT)
Dept: FAMILY MEDICINE CLINIC | Age: 62
End: 2022-12-13

## 2022-12-13 RX ORDER — TELMISARTAN AND HYDROCHLORTHIAZIDE 80; 25 MG/1; MG/1
1 TABLET ORAL DAILY
Qty: 90 TABLET | Refills: 4 | Status: SHIPPED | OUTPATIENT
Start: 2022-12-13

## 2022-12-13 RX ORDER — ESZOPICLONE 3 MG/1
3 TABLET, FILM COATED ORAL
Qty: 90 TABLET | Refills: 0 | Status: SHIPPED | OUTPATIENT
Start: 2022-12-13 | End: 2022-12-14 | Stop reason: SDUPTHER

## 2022-12-13 RX ORDER — ESZOPICLONE 3 MG/1
3 TABLET, FILM COATED ORAL
Qty: 90 TABLET | Refills: 0 | OUTPATIENT
Start: 2022-12-13

## 2022-12-13 NOTE — TELEPHONE ENCOUNTER
Ms Rand Almanza called and said that she does want lunestra and hydrochlorothyazide called to [facility].  Pt is also requesting diflucan to be called to [facility] for a yeast infection cause by the antibiotic that Dr. Sarbjit Rodriguez gave her. Please advise.

## 2023-01-03 ENCOUNTER — TELEPHONE (OUTPATIENT)
Dept: FAMILY MEDICINE CLINIC | Age: 63
End: 2023-01-03

## 2023-01-03 DIAGNOSIS — U07.1 COVID-19: Primary | ICD-10-CM

## 2023-01-03 NOTE — TELEPHONE ENCOUNTER
Pt tested positive for Covid Saturday 1/31/22  has fever, chills, cough, congestion,  headache, fatigue, appetite decreased   patient started with symptoms on  symptoms started Saturday as well , was exposed on mary . Pt has tried mucinex night and day time ,tylenol , and zycam rapid melts with little relief . If pt is Candidate for antiviral she would prefer  [facility] on [address] they have the following available below .  Pt would also like     [facility] #[id number]    [address], [address], [address] [place], Product #    7 Available       [facility] #[id number]    [address], [address], [address] [place] (molnupiravir), Product #    48 Available

## 2023-01-04 DIAGNOSIS — U07.1 COVID-19: ICD-10-CM

## 2023-01-04 NOTE — TELEPHONE ENCOUNTER
[facility] is out of this medication and patient would like it sent to [facility]. She said that she called there and they have the medication is stock. Please advise     Requested Prescriptions     Pending Prescriptions Disp Refills    molnupiravir 200 mg capsule 40 Capsule 0     Sig: Take 4 Capsules by mouth every twelve (12) hours.

## 2023-01-06 ENCOUNTER — TELEPHONE (OUTPATIENT)
Dept: FAMILY MEDICINE CLINIC | Age: 63
End: 2023-01-06

## 2023-01-06 NOTE — TELEPHONE ENCOUNTER
Patient called and said that she is having a hard time getting the Covid medication. She said that she called the [facility] on [address] in [place] and they have Angelazid she wanted to know if you could call this in for her.  Please advise

## 2023-01-19 ENCOUNTER — OFFICE VISIT (OUTPATIENT)
Dept: FAMILY MEDICINE CLINIC | Age: 63
End: 2023-01-19
Payer: COMMERCIAL

## 2023-01-19 VITALS
RESPIRATION RATE: 12 BRPM | OXYGEN SATURATION: 99 % | HEART RATE: 88 BPM | SYSTOLIC BLOOD PRESSURE: 130 MMHG | WEIGHT: 170 LBS | DIASTOLIC BLOOD PRESSURE: 89 MMHG | BODY MASS INDEX: 30.12 KG/M2 | HEIGHT: 63 IN | TEMPERATURE: 98.2 F

## 2023-01-19 DIAGNOSIS — N64.4 BREAST PAIN, RIGHT: Primary | ICD-10-CM

## 2023-01-19 NOTE — PROGRESS NOTES
Chief Complaint   Patient presents with    Breast pain     Right pt states a stabbing pain comes and goes over a month        Subjective  Amalia Durán is a 65 y.o. female. HPI  Pt c/o intermittent R breast pain. The pain is sharp and stabbing. It recently radiated to her armpit. She does not feel the pain is in her chest wall. She is doing self exams and has not noticed and lumps. Review of Systems   Constitutional: Negative. HENT: Negative. Respiratory: Negative. Cardiovascular: Negative. Genitourinary:         Breast pain   All other systems reviewed and are negative. Objective  Physical Exam  Vitals and nursing note reviewed. Constitutional:       Appearance: Normal appearance. She is not ill-appearing. HENT:      Head: Normocephalic and atraumatic. Right Ear: External ear normal.      Left Ear: External ear normal.      Nose: Nose normal.      Mouth/Throat:      Mouth: Mucous membranes are moist.   Eyes:      Extraocular Movements: Extraocular movements intact. Conjunctiva/sclera: Conjunctivae normal.   Cardiovascular:      Rate and Rhythm: Normal rate and regular rhythm. Heart sounds: No murmur heard. No friction rub. No gallop. Pulmonary:      Effort: Pulmonary effort is normal.      Breath sounds: Normal breath sounds. No wheezing, rhonchi or rales. Chest:      Chest wall: Tenderness present. No mass or deformity. Musculoskeletal:         General: Normal range of motion. Cervical back: Normal range of motion. Lymphadenopathy:      Upper Body:      Right upper body: No supraclavicular or axillary adenopathy. Skin:     General: Skin is warm and dry. Neurological:      Mental Status: She is alert and oriented to person, place, and time. Coordination: Coordination normal.   Psychiatric:         Mood and Affect: Mood normal.         Behavior: Behavior normal.         Thought Content:  Thought content normal.         Judgment: Judgment normal. Assessment & Plan  Diagnoses and all orders for this visit:    1. Breast pain, right  -     MARC 3D RHODA W MAMMO RT DX INCL CAD; Future  -     US BREAST RT LIMITED=<3 QUAD; Future  Follow-up as indicated  Follow-up and Dispositions    Return if symptoms worsen or fail to improve.        Rc Leyva MD

## 2023-01-19 NOTE — PROGRESS NOTES
Delpha Level presents today for   Chief Complaint   Patient presents with    Breast pain     Right pt states a stabbing pain comes and goes over a month          Vitals:    01/19/23 1223 01/19/23 1228   BP: (!) 132/91 130/89   Pulse: 88    Resp: 12    Temp: 98.2 °F (36.8 °C)    TempSrc: Temporal    SpO2: 99%    Weight: 170 lb (77.1 kg)    Height: 5' 3\" (1.6 m)         Delpha Level preferred language for health care discussion is english/other. Is someone accompanying this pt? no    Is the patient using any DME equipment during [address]? no    Depression Screening:  3 most recent PHQ Screens 1/19/2023   Little interest or pleasure in doing things Not at all   Feeling down, depressed, irritable, or hopeless Not at all   Total Score PHQ 2 0       Learning Assessment:  Learning Assessment 2/19/2021   PRIMARY LEARNER Patient   HIGHEST LEVEL OF EDUCATION - PRIMARY LEARNER  SOME COLLEGE   BARRIERS PRIMARY LEARNER NONE   CO-LEARNER CAREGIVER No   PRIMARY LANGUAGE ENGLISH   LEARNER PREFERENCE PRIMARY READING     -     -     -     -   ANSWERED BY Patient   RELATIONSHIP SELF       Abuse Screening:  Abuse Screening Questionnaire 9/10/2021   Do you ever feel afraid of your partner? N   Are you in a relationship with someone who physically or mentally threatens you? N   Is it safe for you to go home? Y       Generalized Anxiety  MILLA 2/7 4/14/2021   Feeling nervous, anxious or on edge? 1   Not being able to stop or control worrying? 0   MILLA-2 Subtotal 1         Health Maintenance Due   Topic Date Due    DTaP/Tdap/Td series (1 - Tdap) Never done    Shingles Vaccine (1 of 2) Never done    Depression Screen  11/08/2022   . Health Maintenance reviewed and discussed and ordered per Provider. VACCINES DUE   SCREENINGS DUE       Delpha Level is updated on all HM    1. \"Have you been to the ER, urgent care clinic since your last visit? Hospitalized since your last visit? \" No    2.  \"Have you seen or consulted any other health care providers outside of the [address] [place] [address] since your last visit? \" No     3. For patients aged 39-70: Has the patient had a colonoscopy / FIT/ Cologuard? Yes - no Care Gap present     If the patient is female:    4. For patients aged 41-77: Has the patient had a mammogram within the past 2 years? Yes - no Care Gap present    5. For patients aged 21-65: Has the patient had a pap smear?  Yes - no Care Gap present

## 2023-01-20 ENCOUNTER — TELEPHONE (OUTPATIENT)
Dept: FAMILY MEDICINE CLINIC | Age: 63
End: 2023-01-20

## 2023-01-20 DIAGNOSIS — E65 ABDOMINAL PANNUS: Primary | ICD-10-CM

## 2023-01-20 NOTE — TELEPHONE ENCOUNTER
Pt called plastic surgeon Arjun Butts she made an appt for 03/10/23 at 10:30 they need a referral faxed to them from you because they are not going to consider this a cosmetic  thing. Per pt's request she would like you to fax referral to their office fax#[phone].

## 2023-01-27 ENCOUNTER — OFFICE VISIT (OUTPATIENT)
Dept: PULMONOLOGY | Age: 63
End: 2023-01-27
Payer: COMMERCIAL

## 2023-01-27 VITALS
HEART RATE: 87 BPM | BODY MASS INDEX: 29.94 KG/M2 | DIASTOLIC BLOOD PRESSURE: 77 MMHG | WEIGHT: 169 LBS | TEMPERATURE: 97 F | SYSTOLIC BLOOD PRESSURE: 135 MMHG | OXYGEN SATURATION: 99 %

## 2023-01-27 DIAGNOSIS — Z98.84 S/P GASTRIC BYPASS: ICD-10-CM

## 2023-01-27 DIAGNOSIS — R06.02 SOB (SHORTNESS OF BREATH): Primary | ICD-10-CM

## 2023-01-27 NOTE — PROGRESS NOTES
[facility]  Pulmonary, Critical Care, and Sleep Medicine      Pulmonary Office Initial referral report    Name: Marry Hunt     : 1960     Date: 2023        Subjective:   Patient has been referred for evaluation of: Shortness of breath episodes    Patient is a 65 y.o. female who is a non-smoker states that she was well until . Right before the pandemic started she had come down with what she describes as a flu and pneumonia from which she recovered but since then she gets sensation of not being able to catch her breath-happens episodically usually at rest when she is sitting. She has not noticed any similar episodes when she is up and active and has not had any problems during sleep. She best describes these as a need to take a deep breath. Denies any associated wheezing, denies any chest pain, denies any cough or congestion. She continues to work full-time with no limitation related to her breathing. She states that she sleeps well-wakes up fresh and does not have any cognitive problems.     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  Environmental exposures-none  ILD history:  No Hx of connective tissue disease such as RA, Lupus, Scleroderma  No Hx Raynauds  No Hx sarcoidosis  No Hx taking medications- methotrexate, Amiodarone, Nitrofurantoin  No Hx cancer, chemotherapy, radiation  No Hx Birds, chickens, farm animals  Hx using hair spray  No Hx asbestos exposure, coal mining, textile industry work, construction work  Hx smoking-quit 20 years back  No Hx TB/positive PPD  Possible Hx covid-19 pneumonia-2020       Review of data:  I have personally reviewed all data-clinical encounters, imaging, outside test results pertinent to patient's care. Testing:  CXR  PFT    Past Medical History:   Diagnosis Date    AR (allergic rhinitis)     Cerebral thrombosis with cerebral infarction ([address] 2010    COVID 2023    Depression     GERD (gastroesophageal reflux disease) 2011    HTN (hypertension)     Hyperlipidemia     Rhinitis     Right shoulder pain     Ruptured appendix     Uterine prolapse        Past Surgical History:   Procedure Laterality Date    HX APPENDECTOMY  13    [facility]    HX BREAST REDUCTION      HX GASTRIC BYPASS  12    HX HAMMER TOE REPAIR  1986    right 5th toe    HX ORTHOPAEDIC      Right shoulder    HX TOTAL ABDOMINAL HYSTERECTOMY      MS ANES NRV MUSC TNDN FSCIA BURSA SHOULDER & AXILLA Right 2021    rotator cuff repair    MS COLPOPEXY VAGINAL INTRAPERITONEAL APPROACH  2018    [facility]    MS LAPAROSCOPY SURG CHOLECYSTECTOMY         Social History     Socioeconomic History    Marital status:    Occupational History    Occupation: MA   Tobacco Use    Smoking status: Former     Packs/day: 0.50     Years: 15.00     Pack years: 7.50     Types: Cigarettes     Quit date: 2002     Years since quittin.0    Smokeless tobacco: Never   Vaping Use    Vaping Use: Never used   Substance and Sexual Activity    Alcohol use:  Yes     Alcohol/week: 2.0 standard drinks     Types: 2 Glasses of wine per week    Drug use: Not Currently     Types: Marijuana     Social Determinants of Health     Financial Resource Strain: Low Risk     Difficulty of Paying Living Expenses: Not hard at all   Food Insecurity: No Food Insecurity    Worried About Running Out of Food in the Last Year: Never true    Ran Out of Food in the Last Year: Never true       Family History   Problem Relation Age of Onset    Hypertension Mother     Heart Attack Mother     Other Mother     OSTEOARTHRITIS Father     Cancer Father         prostate Hypertension Father     Stroke Father         CVA    Pneumonia Father 80        covid pna 2/2021    Other Father     Heart Attack Maternal Grandmother         MI    Osteoporosis Maternal Grandfather         Alzheimer's Disease    Other Paternal Grandmother     Heart Attack Paternal Grandfather         MI    Other Other         1 sib complications from Gastric Bypass    Hypertension Other         1 sib    Stroke Brother     Other Maternal Aunt        Allergies   Allergen Reactions    Percocet [Oxycodone-Acetaminophen] Itching    Tioconazole Itching       Current Outpatient Medications   Medication Sig Dispense Refill    pedi multivit no.25/folic acid (CHILDREN'S CHEWABLE MULTIVITMN PO) Take  by mouth. Flinstones w iron      eszopiclone (LUNESTA) 3 mg tablet Take 1 Tablet by mouth nightly as needed for Sleep. Max Daily Amount: 3 mg. 90 Tablet 0    telmisartan-hydroCHLOROthiazide (MICARDIS HCT) 80-25 mg per tablet Take 1 Tablet by mouth daily. 90 Tablet 4    conjugated estrogens (PREMARIN) 0.3 mg tablet Take 1 Tablet by mouth daily. 90 Tablet 1    montelukast (SINGULAIR) 10 mg tablet Take 1 Tablet by mouth daily. 90 Tablet 1    venlafaxine-SR (EFFEXOR-XR) 75 mg capsule Take 1 Capsule by mouth daily. 90 Capsule 1    atorvastatin (LIPITOR) 10 mg tablet Take 1 Tablet by mouth daily. 90 Tablet 4    azelastine (ASTELIN) 137 mcg (0.1 %) nasal spray 1 [place] by Both Nostrils route two (2) times a day. Use in each nostril as directed 3 Each 4    albuterol (PROVENTIL HFA, VENTOLIN HFA, PROAIR HFA) 90 mcg/actuation inhaler Take 2 Puffs by inhalation every four (4) hours as needed for Wheezing. 3 Each 4    esomeprazole (NEXIUM) 40 mg capsule TAKE 1 CAPSULE DAILY 90 Capsule 3    amLODIPine (NORVASC) 10 mg tablet Take 1 Tablet by mouth daily. 90 Tablet 4    Biotin 2,500 mcg cap Take  by mouth.      calcium carbonate (CALTREX) 600 mg (1,500 mg) tablet Take 600 mg by mouth daily.       Vitamin B Complex-Vit B12 1,200 mcg/mL Drop by SubLINGual route daily.              Review of Systems:  HEENT: No epistaxis, no nasal drainage, no difficulty in swallowing, no redness in eyes  Respiratory: as above  Cardiovascular: no chest pain, no palpitations, no chronic leg edema, no syncope  Gastrointestinal: no abd pain, no vomiting, no diarrhea, no bleeding symptoms  Genitourinary: No urinary symptoms or hematuria  Integument/breast: No ulcers or rashes  Musculoskeletal:Neg  Neurological: No focal weakness, no seizures, no headaches  Behvioral/Psych: No anxiety, no depression  Constitutional: No fever, no chills, no weight loss, no night sweats     Objective:   Visit Vitals  /77 (BP 1 Location: Left upper arm, BP Patient Position: Sitting)   Pulse 87   Temp 97 °F (36.1 °C) (Temporal)   Wt 76.7 kg (169 lb)   LMP 12/31/1996   SpO2 99%   BMI 29.94 kg/m²        Physical Exam:   General: comfortable, no acute distress  HEENT: pupils reactive, sclera anicteric, EOM intact  Neck: No adenopathy or thyroid swelling, no lymphadenopathy or JVD, supple  CVS: S1S2 no murmurs  RS: Mod AE bilaterally, no tactile fremitus or egophony, no accessory muscle use  Abd: soft, non tender, no hepatosplenomegaly  Neuro: non focal, awake, alert  Extrm: no leg edema, clubbing or cyanosis  Skin: no rash    Data review:   Pertinent labs: CBC, BMP, LFT's    PFT:        Results for orders placed or performed during the hospital encounter of 10/18/21   EKG, 12 LEAD, INITIAL   Result Value Ref Range    Ventricular Rate 85 BPM    Atrial Rate 85 BPM    P-R Interval 166 ms    QRS Duration 76 ms    Q-T Interval 366 ms    QTC Calculation (Bezet) 435 ms    Calculated P Axis 59 degrees    Calculated R Axis 0 degrees    Calculated T Axis 28 degrees    Diagnosis       Normal sinus rhythm  No previous ECGs available  Confirmed by Ida Adler ([id number]) on 10/18/2021 5:41:03 PM     Results for orders placed or performed in visit on 08/17/10   AMB POC EKG ROUTINE W/ 12 LEADS, INTER & REP Impression    NSR       Imaging:  I have personally reviewed the patients radiographs and have reviewed the reports:    Chest x-ray-3/7/2020    FINDINGS:      The lungs are clear. The heart and mediastinum are unremarkable. No evidence of pleural effusion. Bones and soft tissues appear unremarkable    IMPRESSION:    1. Unremarkable chest    XR Results (most recent):  Results from Orders Only encounter on 10/01/20    XR SHOULDER RT AP/LAT MIN 2 V    Narrative  RIGHT SHOULDER, 2 views    CPT CODE: 58291    INDICATION: Above. Right shoulder pain, no known traumatic injury. COMPARISON: No prior right shoulder radiographs. Reference chest radiographs  3/7/2020. TECHNIQUE:  Two AP views of the right shoulder with the humerus held in internal  and external rotation are reviewed. FINDINGS:    There is no evidence of acute fracture or dislocation. The visualized joint  spaces appear grossly maintained. No definite radiographic evidence of  significant localized soft tissue swelling is seen. Streaky opacities are noted overlying the soft tissues of the lower  neck/shoulder region possibly related to the patient's hair. Metallic  buckle-like opacity projects over the right axillary region likely related to  clothing. Impression  Impression:    No evidence of acute fracture or dislocation. CT Results (most recent):  No results found for this or any previous visit.     .     Patient Active Problem List   Diagnosis Code    Essential hypertension I10    Cerebral thrombosis with cerebral infarction ([facility] [facility] I63.30    Hyperlipidemia E78.5    AR (allergic rhinitis) J30.9    GERD (gastroesophageal reflux disease) K21.9    S/P gastric bypass Z98.84    Thrombocytosis D75.839    SOB (shortness of breath) R06.02       IMPRESSION:   Episodes of shortness of breath-described by patient is need for taking deep breath every so often usually at rest.  Started after she had flu/pneumonia type of illness in early 2020 immediately prior to start of COVID pandemic. Question if she had COVID at that time and has some residual tracheobronchomalacia-sequelae. Chest imaging and PFTs unremarkable and therefore not functionally significant. Less likely VCD . patient reassured. History of sleep apnea-AHI of 10 on sleep study in 2010-never treated. Currently states she sleeps well, wakes up refreshed, does not have any cognitive impairment. History of allergic rhinitis  History of hypertension  History of gastric bypass  History of GERD      RECOMMENDATIONS:   Explained to patient that in view of normal imaging, no significant PFT abnormalities do not suspect any structural functional pulmonary problems. Discussed need for further intervention only if develops new symptoms or persistent symptoms. Discussed her sleep hygiene and consideration for repeat sleep study. She feels well and defers it which I agree given low AHI on last study. Patient encouraged to continue with current treatment, active lifestyle  Preventive vaccinations  We will follow-up if she develops any new symptoms otherwise defer management to primary team     Health maintenance screens deferred to Primary care provider. Marcie Eric MD    This patient has a moderate-high complexity chronic care condition   This Visit needed Moderate complexity medically necessary decision making and management plans. Please note that this dictation was completed with [employer], the [employer] voice recognition software. Quite often unanticipated grammatical, syntax, homophones, and other interpretive errors are inadvertently transcribed by the computer software. Please disregard these errors. Please excuse any errors that have escaped final proofreading.

## 2023-01-27 NOTE — PROGRESS NOTES
Marie Gay presents today for dyspnea. Pt reports needing to catch her breath at times without exertion. Chief Complaint   Patient presents with    Breathing Problem       Is someone accompanying this pt? no    Is the patient using any DME equipment during OV? no    -DME Company n/a    Depression Screening:  3 most recent PHQ Screens 1/27/2023   Little interest or pleasure in doing things Not at all   Feeling down, depressed, irritable, or hopeless Not at all   Total Score PHQ 2 0       Learning Assessment:  Learning Assessment 2/19/2021   PRIMARY LEARNER Patient   HIGHEST LEVEL OF EDUCATION - PRIMARY LEARNER  SOME COLLEGE   BARRIERS PRIMARY LEARNER NONE   CO-LEARNER CAREGIVER No   PRIMARY LANGUAGE ENGLISH   LEARNER PREFERENCE PRIMARY READING     -     -     -     -   ANSWERED BY Patient   RELATIONSHIP SELF       Abuse Screening:  Abuse Screening Questionnaire 9/10/2021   Do you ever feel afraid of your partner? N   Are you in a relationship with someone who physically or mentally threatens you? N   Is it safe for you to go home? Y       Fall Risk  Fall Risk Assessment, last 12 mths 1/22/2020   Able to walk? Yes   Fall in past 12 months? No         Coordination of Care:  1. Have you been to the ER, urgent care clinic since your last visit? Hospitalized since your last visit? No    2. Have you seen or consulted any other health care providers outside of the [address] [place] [address] since your last visit? Include any pap smears or colon screening.  no

## 2023-01-27 NOTE — LETTER
1/27/2023    Patient: Clyde Zamudio   YOB: 1960   Date of Visit: 1/27/2023     Emmett Villatoro MD  [id number]  [phone]  Pinky Miles    Dear Emmett Villatoro MD,      Thank you for referring Ms. Mayela Zaman to [address] [place] [address] for evaluation. My notes for this consultation are attached. If you have questions, please do not hesitate to call me. I look forward to following your patient along with you.       Sincerely,    Juana Anand MD

## 2023-02-01 ENCOUNTER — TELEPHONE (OUTPATIENT)
Dept: FAMILY MEDICINE CLINIC | Age: 63
End: 2023-02-01

## 2023-02-01 NOTE — TELEPHONE ENCOUNTER
Derek Nguyễn from [facility] needs an order faxed for right diagnostic mammogram with ultrasound if needed.   Please fax to Derek Nguyễn #[phone]

## 2023-02-13 DIAGNOSIS — N81.9 FEMALE GENITAL PROLAPSE, UNSPECIFIED TYPE: Primary | ICD-10-CM

## 2023-02-24 ENCOUNTER — OFFICE VISIT (OUTPATIENT)
Age: 63
End: 2023-02-24
Payer: COMMERCIAL

## 2023-02-24 VITALS
TEMPERATURE: 97 F | DIASTOLIC BLOOD PRESSURE: 72 MMHG | BODY MASS INDEX: 29.95 KG/M2 | HEART RATE: 69 BPM | RESPIRATION RATE: 16 BRPM | SYSTOLIC BLOOD PRESSURE: 123 MMHG | OXYGEN SATURATION: 97 % | WEIGHT: 169 LBS | HEIGHT: 63 IN

## 2023-02-24 DIAGNOSIS — G47.00 INSOMNIA, UNSPECIFIED TYPE: ICD-10-CM

## 2023-02-24 DIAGNOSIS — I10 ESSENTIAL (PRIMARY) HYPERTENSION: ICD-10-CM

## 2023-02-24 DIAGNOSIS — R10.13 EPIGASTRIC PAIN: Primary | ICD-10-CM

## 2023-02-24 DIAGNOSIS — F41.9 ANXIETY DISORDER, UNSPECIFIED TYPE: ICD-10-CM

## 2023-02-24 DIAGNOSIS — Z98.84 BARIATRIC SURGERY STATUS: ICD-10-CM

## 2023-02-24 DIAGNOSIS — L98.9 SKIN LESION: ICD-10-CM

## 2023-02-24 PROCEDURE — 99214 OFFICE O/P EST MOD 30 MIN: CPT | Performed by: FAMILY MEDICINE

## 2023-02-24 PROCEDURE — 3074F SYST BP LT 130 MM HG: CPT | Performed by: FAMILY MEDICINE

## 2023-02-24 PROCEDURE — 3078F DIAST BP <80 MM HG: CPT | Performed by: FAMILY MEDICINE

## 2023-02-24 RX ORDER — ZOLPIDEM TARTRATE 12.5 MG/1
TABLET, FILM COATED, EXTENDED RELEASE ORAL
COMMUNITY
Start: 2016-11-18

## 2023-02-24 RX ORDER — ALBUTEROL SULFATE 90 UG/1
2 AEROSOL, METERED RESPIRATORY (INHALATION) EVERY 4 HOURS PRN
COMMUNITY
Start: 2020-03-07

## 2023-02-24 RX ORDER — AMLODIPINE BESYLATE 10 MG/1
TABLET ORAL
COMMUNITY
Start: 2022-12-22 | End: 2023-02-24 | Stop reason: SDUPTHER

## 2023-02-24 RX ORDER — ATORVASTATIN CALCIUM 10 MG/1
10 TABLET, FILM COATED ORAL DAILY
COMMUNITY
Start: 2022-09-16

## 2023-02-24 RX ORDER — VENLAFAXINE HYDROCHLORIDE 75 MG/1
75 CAPSULE, EXTENDED RELEASE ORAL DAILY
Qty: 90 CAPSULE | Refills: 4 | Status: SHIPPED | OUTPATIENT
Start: 2023-02-24

## 2023-02-24 RX ORDER — CONJUGATED ESTROGENS 0.3 MG/1
TABLET, FILM COATED ORAL
COMMUNITY
Start: 2022-12-06

## 2023-02-24 RX ORDER — ESOMEPRAZOLE MAGNESIUM 40 MG/1
40 CAPSULE, DELAYED RELEASE ORAL 2 TIMES DAILY
Qty: 180 CAPSULE | Refills: 1 | Status: SHIPPED | OUTPATIENT
Start: 2023-02-24

## 2023-02-24 RX ORDER — TELMISARTAN AND HYDROCHLORTHIAZIDE 80; 25 MG/1; MG/1
TABLET ORAL
COMMUNITY
Start: 2022-12-21

## 2023-02-24 RX ORDER — AMLODIPINE BESYLATE 10 MG/1
10 TABLET ORAL DAILY
Qty: 90 TABLET | Refills: 4 | Status: SHIPPED | OUTPATIENT
Start: 2023-02-24

## 2023-02-24 RX ORDER — VENLAFAXINE HYDROCHLORIDE 75 MG/1
75 CAPSULE, EXTENDED RELEASE ORAL DAILY
COMMUNITY
Start: 2022-10-07 | End: 2023-02-24 | Stop reason: SDUPTHER

## 2023-02-24 RX ORDER — ESZOPICLONE 3 MG/1
3 TABLET, FILM COATED ORAL
COMMUNITY
Start: 2017-03-25

## 2023-02-24 RX ORDER — MONTELUKAST SODIUM 10 MG/1
10 TABLET ORAL DAILY
COMMUNITY
Start: 2022-11-22

## 2023-02-24 RX ORDER — ESOMEPRAZOLE MAGNESIUM 40 MG/1
40 CAPSULE, DELAYED RELEASE ORAL 2 TIMES DAILY
Qty: 180 CAPSULE | Refills: 1 | Status: SHIPPED | OUTPATIENT
Start: 2023-02-24 | End: 2023-02-24 | Stop reason: SDUPTHER

## 2023-02-24 RX ORDER — AZELASTINE 1 MG/ML
1 SPRAY, METERED NASAL 2 TIMES DAILY
COMMUNITY
Start: 2022-08-15

## 2023-02-24 SDOH — ECONOMIC STABILITY: FOOD INSECURITY: WITHIN THE PAST 12 MONTHS, YOU WORRIED THAT YOUR FOOD WOULD RUN OUT BEFORE YOU GOT MONEY TO BUY MORE.: NEVER TRUE

## 2023-02-24 SDOH — ECONOMIC STABILITY: INCOME INSECURITY: HOW HARD IS IT FOR YOU TO PAY FOR THE VERY BASICS LIKE FOOD, HOUSING, MEDICAL CARE, AND HEATING?: NOT HARD AT ALL

## 2023-02-24 SDOH — ECONOMIC STABILITY: FOOD INSECURITY: WITHIN THE PAST 12 MONTHS, THE FOOD YOU BOUGHT JUST DIDN'T LAST AND YOU DIDN'T HAVE MONEY TO GET MORE.: NEVER TRUE

## 2023-02-24 SDOH — ECONOMIC STABILITY: HOUSING INSECURITY
IN THE LAST 12 MONTHS, WAS THERE A TIME WHEN YOU DID NOT HAVE A STEADY PLACE TO SLEEP OR SLEPT IN A SHELTER (INCLUDING NOW)?: NO

## 2023-02-24 ASSESSMENT — PATIENT HEALTH QUESTIONNAIRE - PHQ9
SUM OF ALL RESPONSES TO PHQ QUESTIONS 1-9: 0
SUM OF ALL RESPONSES TO PHQ QUESTIONS 1-9: 0
SUM OF ALL RESPONSES TO PHQ9 QUESTIONS 1 & 2: 0
SUM OF ALL RESPONSES TO PHQ QUESTIONS 1-9: 0
2. FEELING DOWN, DEPRESSED OR HOPELESS: 0
SUM OF ALL RESPONSES TO PHQ QUESTIONS 1-9: 0
1. LITTLE INTEREST OR PLEASURE IN DOING THINGS: 0

## 2023-02-24 ASSESSMENT — ANXIETY QUESTIONNAIRES
GAD7 TOTAL SCORE: 0
4. TROUBLE RELAXING: 0
2. NOT BEING ABLE TO STOP OR CONTROL WORRYING: 0
5. BEING SO RESTLESS THAT IT IS HARD TO SIT STILL: 0
6. BECOMING EASILY ANNOYED OR IRRITABLE: 0
IF YOU CHECKED OFF ANY PROBLEMS ON THIS QUESTIONNAIRE, HOW DIFFICULT HAVE THESE PROBLEMS MADE IT FOR YOU TO DO YOUR WORK, TAKE CARE OF THINGS AT HOME, OR GET ALONG WITH OTHER PEOPLE: NOT DIFFICULT AT ALL
3. WORRYING TOO MUCH ABOUT DIFFERENT THINGS: 0
7. FEELING AFRAID AS IF SOMETHING AWFUL MIGHT HAPPEN: 0
1. FEELING NERVOUS, ANXIOUS, OR ON EDGE: 0

## 2023-02-24 NOTE — PROGRESS NOTES
ASSESSMENT/PLAN:  1. Epigastric pain  -     esomeprazole (NEXIUM) 40 MG delayed release capsule; Take 1 capsule by mouth in the morning and at bedtime, Disp-180 capsule, R-1Print  -     [facility] -  Odalis Carver MD, Bariatric Surgery, [place]  Concern for possible pouchitis. Advised patient that evaluation by bariatric surgeon would be prudent at this time. Her surgery was 7 years ago at [facility]. We will have her establish care with a civilian provider if approved by her insurance. 2. Essential (primary) hypertension  -     amLODIPine (NORVASC) 10 MG tablet; Take 1 tablet by mouth daily, Disp-90 tablet, R-4Normal  Stable, cont pres tx plan. 3. Insomnia, unspecified type  Stable, cont pres tx plan. 4. Anxiety disorder, unspecified type  -     venlafaxine (EFFEXOR XR) 75 MG extended release capsule; Take 1 capsule by mouth daily, Disp-90 capsule, R-4Normal  Stable, cont pres tx plan. 5. Skin lesion  -     External Referral To Dermatology    6. Bariatric surgery status  -     [facility] -  Odalis Carver MD, Bariatric Surgery, [place]      Return in about 3 months (around 5/24/2023) for HTN, gerd, insomnia, anxiety, specialist eval.      SUBJECTIVE/OBJECTIVE:    Chief Complaint   Patient presents with    Hypertension    Insomnia    Allergic Rhinitis     Abdominal Pain     Pt states stomach gets upset when eating been going on for 3 weeks    Medication Refill         HPI    Genaro Morris is a 65 y.o. female presenting today for follow-up of hypertension, allergic rhinitis, insomnia. Patient does need medication refills today. New concerns today: pt c/o intermittent abd pain. She has severe pain at times, early satiety, and may have increased pain with eating. She may occasionally have diarrhea. She has tried mylanta with mild improvement. Pt reports that the skin lesion on her L lower leg has not resolved. It will dry out but then flare up again.      Review of Systems   Constitutional: Negative. HENT: Negative. Respiratory: Negative. Cardiovascular: Negative. Gastrointestinal:  Positive for abdominal pain. Skin:  Positive for rash. All other systems reviewed and are negative. Physical Exam  Vitals and nursing note reviewed. Constitutional:       General: She is not in acute distress. Appearance: Normal appearance. HENT:      Head: Normocephalic and atraumatic. Right Ear: External ear normal.      Left Ear: External ear normal.      Nose: Nose normal.      Mouth/Throat:      Mouth: Mucous membranes are moist.   Eyes:      Extraocular Movements: Extraocular movements intact. Conjunctiva/sclera: Conjunctivae normal.   Cardiovascular:      Rate and Rhythm: Normal rate and regular rhythm. Heart sounds: No murmur heard. No friction rub. No gallop. Pulmonary:      Effort: Pulmonary effort is normal.      Breath sounds: Normal breath sounds. No wheezing, rhonchi or rales. Musculoskeletal:         General: Normal range of motion. Cervical back: Normal range of motion. Skin:     General: Skin is warm and dry. Findings: Lesion present. Neurological:      Mental Status: She is alert and oriented to person, place, and time. Coordination: Coordination normal.   Psychiatric:         Mood and Affect: Mood normal.         Behavior: Behavior normal.         Thought Content: Thought content normal.         Judgment: Judgment normal.                 An electronic signature was used to authenticate this note.     --Yo Knutson MD

## 2023-02-24 NOTE — PROGRESS NOTES
Janine Grief presents today for   Chief Complaint   Patient presents with    Hypertension    Insomnia    Allergic Rhinitis     Abdominal Pain     Pt states stomach gets upset when eating been going on for 3 weeks    Medication Refill       Vitals:    02/24/23 1310   BP: 123/72   Site: Left Upper Arm   Position: Sitting   Pulse: 69   Resp: 16   Temp: 97 °F (36.1 °C)   TempSrc: Temporal   SpO2: 97%   Weight: 169 lb (76.7 kg)   Height: 5' 3\" (1.6 m)        Is someone accompanying this pt? no    Is the patient using any DME equipment during OV? no    Depression Screening:  PHQ-9 Questionaire 2/24/2023   Little interest or pleasure in doing things 0   Feeling down, depressed, or hopeless 0   PHQ-9 Total Score 0       Abuse Screening: AMB Abuse Screening 2/24/2023   Do you ever feel afraid of your partner? N   Are you in a relationship with someone who physically or mentally threatens you? N   Is it safe for you to go home? Y       Fall Screening  No flowsheet data found. Generalized Anxiety  ESME-7 SCREENING 2/24/2023   Feeling nervous, anxious, or on edge Not at all   Not being able to stop or control worrying Not at all   Worrying too much about different things Not at all   Trouble relaxing Not at all   Being so restless that it is hard to sit still Not at all   Becoming easily annoyed or irritable Not at all   Feeling afraid as if something awful might happen Not at all   ESME-7 Total Score 0   How difficult have these problems made it for you to do your work, take care of things at home, or get along with other people? Not difficult at all        Health Maintenance Due   Topic Date Due    Hepatitis C screen  Never done    DTaP/Tdap/Td vaccine (1 - Tdap) Never done    Shingles vaccine (1 of 2) Never done   . Health Maintenance reviewed and discussed and ordered per Provider. Vaccines Due   Screenings Due       Herschidi Grief is updated on all HM    1.  \"Have you been to the ER, urgent care clinic since your last visit? Hospitalized since your last visit? \" No    2. \"Have you seen or consulted any other health care providers outside of the [address] [place] [address] since your last visit? \" No     3. For patients aged 39-70: Has the patient had a colonoscopy / FIT/ Cologuard? Yes     If the patient is female:    4. For patients aged 41-77: Has the patient had a mammogram within the past 2 years? Yes    5. For patients aged 21-65: Has the patient had a pap smear?  Yes

## 2023-02-26 ASSESSMENT — ENCOUNTER SYMPTOMS
ABDOMINAL PAIN: 1
RESPIRATORY NEGATIVE: 1

## 2023-03-03 ENCOUNTER — TELEPHONE (OUTPATIENT)
Facility: CLINIC | Age: 63
End: 2023-03-03

## 2023-03-08 ENCOUNTER — TELEPHONE (OUTPATIENT)
Facility: CLINIC | Age: 63
End: 2023-03-08

## 2023-03-09 ENCOUNTER — TELEPHONE (OUTPATIENT)
Age: 63
End: 2023-03-09

## 2023-03-09 DIAGNOSIS — G47.00 INSOMNIA, UNSPECIFIED TYPE: Primary | ICD-10-CM

## 2023-03-09 NOTE — TELEPHONE ENCOUNTER
Patient need a medication refill. Patient  would like it sent to the [facility]. Please advise                  eszopiclone (LUNESTA) 3 mg tablet Take 1 Tablet by mouth nightly as needed for Sleep. Max Daily Amount: 3 mg.

## 2023-03-10 RX ORDER — ESZOPICLONE 3 MG/1
3 TABLET, FILM COATED ORAL NIGHTLY PRN
Qty: 90 TABLET | Refills: 0 | Status: SHIPPED | OUTPATIENT
Start: 2023-03-10 | End: 2023-06-08

## 2023-03-30 DIAGNOSIS — F41.9 ANXIETY DISORDER, UNSPECIFIED TYPE: ICD-10-CM

## 2023-03-31 RX ORDER — VENLAFAXINE HYDROCHLORIDE 75 MG/1
75 CAPSULE, EXTENDED RELEASE ORAL DAILY
Qty: 90 CAPSULE | Refills: 4 | Status: SHIPPED | OUTPATIENT
Start: 2023-03-31

## 2023-04-03 NOTE — TELEPHONE ENCOUNTER
----- Message from Jaswant Chun sent at 3/30/2023  9:31 AM EDT -----  Regarding: Prescription refill  Hi Dr Reji Wolf can you also send in my nasal spray to [facility]. Thanks!  Have a great day

## 2023-04-05 RX ORDER — AZELASTINE 1 MG/ML
1 SPRAY, METERED NASAL 2 TIMES DAILY
Qty: 3 EACH | Refills: 4 | Status: SHIPPED | OUTPATIENT
Start: 2023-04-05

## 2023-05-15 ENCOUNTER — TELEPHONE (OUTPATIENT)
Facility: CLINIC | Age: 63
End: 2023-05-15

## 2023-05-15 NOTE — TELEPHONE ENCOUNTER
----- Message from Kat Johnson sent at 5/12/2023 11:27 AM EDT -----  Subject: Referral Request    Reason for referral request? skin rash  Provider patient wants to be referred to(if known):     Provider Phone Number(if known):     Additional Information for Provider? pt [facility].   ---------------------------------------------------------------------------  --------------  [address] [employer]    [phone]; OK to leave message on voicemail  ---------------------------------------------------------------------------  --------------

## 2023-05-15 NOTE — TELEPHONE ENCOUNTER
----- Message from Kirk Marciano sent at 5/12/2023 11:27 AM EDT -----  Subject: Referral Request    Reason for referral request? skin rash  Provider patient wants to be referred to(if known):     Provider Phone Number(if known):     Additional Information for Provider? pt would like to see the   dermatologist of [facility].   ---------------------------------------------------------------------------  --------------  [id number] [employer]    [phone]; OK to leave message on voicemail  ---------------------------------------------------------------------------  --------------

## 2023-05-16 ENCOUNTER — PATIENT MESSAGE (OUTPATIENT)
Facility: CLINIC | Age: 63
End: 2023-05-16

## 2023-05-17 NOTE — TELEPHONE ENCOUNTER
----- Message from [address] [facility] sent at 5/17/2023  9:10 AM EDT -----  Subject: Message to Provider    QUESTIONS  Information for Provider? Pt states that the dermatologist didn't get   referral and that they need auth #. Please call pt when this is done  ---------------------------------------------------------------------------  --------------  Lit MOJICA  [phone]; OK to leave message on voicemail,OK to respond with electronic   message via [employer] portal (only for patients who have registered [employer]   account)  ---------------------------------------------------------------------------  --------------  SCRIPT ANSWERS  Relationship to Patient?  Self

## 2023-05-23 DIAGNOSIS — G47.00 INSOMNIA, UNSPECIFIED TYPE: ICD-10-CM

## 2023-05-24 RX ORDER — ESZOPICLONE 3 MG/1
3 TABLET, FILM COATED ORAL NIGHTLY PRN
Qty: 90 TABLET | Refills: 0 | Status: SHIPPED | OUTPATIENT
Start: 2023-05-24 | End: 2023-08-22

## 2023-06-11 DIAGNOSIS — I10 ESSENTIAL (PRIMARY) HYPERTENSION: ICD-10-CM

## 2023-06-11 DIAGNOSIS — F41.9 ANXIETY DISORDER, UNSPECIFIED TYPE: ICD-10-CM

## 2023-06-11 DIAGNOSIS — R10.13 EPIGASTRIC PAIN: ICD-10-CM

## 2023-06-12 NOTE — TELEPHONE ENCOUNTER
Last seen 2/24/23  Last labs 11/18/22  Last filled  12/6/22 ,  pt pt [facility] did not receive other Rxs   Next appointment 6/16/23

## 2023-06-16 RX ORDER — CONJUGATED ESTROGENS 0.3 MG/1
0.3 TABLET, FILM COATED ORAL DAILY
Qty: 90 TABLET | Refills: 4 | Status: SHIPPED | OUTPATIENT
Start: 2023-06-16

## 2023-06-16 RX ORDER — AZELASTINE 1 MG/ML
1 SPRAY, METERED NASAL 2 TIMES DAILY
Qty: 3 EACH | Refills: 4 | Status: SHIPPED | OUTPATIENT
Start: 2023-06-16

## 2023-06-16 RX ORDER — ESOMEPRAZOLE MAGNESIUM 40 MG/1
40 CAPSULE, DELAYED RELEASE ORAL 2 TIMES DAILY
Qty: 180 CAPSULE | Refills: 4 | Status: SHIPPED | OUTPATIENT
Start: 2023-06-16

## 2023-06-16 RX ORDER — AMLODIPINE BESYLATE 10 MG/1
10 TABLET ORAL DAILY
Qty: 90 TABLET | Refills: 4 | Status: SHIPPED | OUTPATIENT
Start: 2023-06-16

## 2023-06-16 RX ORDER — VENLAFAXINE HYDROCHLORIDE 75 MG/1
75 CAPSULE, EXTENDED RELEASE ORAL DAILY
Qty: 90 CAPSULE | Refills: 4 | Status: SHIPPED | OUTPATIENT
Start: 2023-06-16

## 2023-06-30 ENCOUNTER — OFFICE VISIT (OUTPATIENT)
Facility: CLINIC | Age: 63
End: 2023-06-30
Payer: COMMERCIAL

## 2023-06-30 VITALS
WEIGHT: 164 LBS | SYSTOLIC BLOOD PRESSURE: 99 MMHG | DIASTOLIC BLOOD PRESSURE: 62 MMHG | BODY MASS INDEX: 29.05 KG/M2 | RESPIRATION RATE: 16 BRPM | OXYGEN SATURATION: 97 % | HEART RATE: 102 BPM

## 2023-06-30 DIAGNOSIS — E78.5 HYPERLIPIDEMIA, UNSPECIFIED HYPERLIPIDEMIA TYPE: ICD-10-CM

## 2023-06-30 DIAGNOSIS — Z98.84 BARIATRIC SURGERY STATUS: ICD-10-CM

## 2023-06-30 DIAGNOSIS — I10 ESSENTIAL (PRIMARY) HYPERTENSION: ICD-10-CM

## 2023-06-30 DIAGNOSIS — E55.9 VITAMIN D DEFICIENCY: ICD-10-CM

## 2023-06-30 DIAGNOSIS — K21.9 GASTROESOPHAGEAL REFLUX DISEASE, UNSPECIFIED WHETHER ESOPHAGITIS PRESENT: ICD-10-CM

## 2023-06-30 DIAGNOSIS — G47.00 INSOMNIA, UNSPECIFIED TYPE: ICD-10-CM

## 2023-06-30 DIAGNOSIS — N95.1 MENOPAUSAL SYMPTOMS: ICD-10-CM

## 2023-06-30 DIAGNOSIS — I10 ESSENTIAL (PRIMARY) HYPERTENSION: Primary | ICD-10-CM

## 2023-06-30 PROCEDURE — 3078F DIAST BP <80 MM HG: CPT | Performed by: FAMILY MEDICINE

## 2023-06-30 PROCEDURE — 99214 OFFICE O/P EST MOD 30 MIN: CPT | Performed by: FAMILY MEDICINE

## 2023-06-30 PROCEDURE — 3074F SYST BP LT 130 MM HG: CPT | Performed by: FAMILY MEDICINE

## 2023-06-30 RX ORDER — PRAVASTATIN SODIUM 40 MG
40 TABLET ORAL DAILY
Qty: 90 TABLET | Refills: 1 | Status: SHIPPED | OUTPATIENT
Start: 2023-06-30

## 2023-06-30 ASSESSMENT — ENCOUNTER SYMPTOMS
ABDOMINAL PAIN: 1
RESPIRATORY NEGATIVE: 1

## 2023-07-07 ENCOUNTER — TELEPHONE (OUTPATIENT)
Facility: CLINIC | Age: 63
End: 2023-07-07

## 2023-07-07 RX ORDER — MONTELUKAST SODIUM 10 MG/1
10 TABLET ORAL DAILY
Qty: 90 TABLET | Refills: 1 | Status: SHIPPED | OUTPATIENT
Start: 2023-07-07

## 2023-07-31 DIAGNOSIS — G47.00 INSOMNIA, UNSPECIFIED TYPE: ICD-10-CM

## 2023-08-02 RX ORDER — ESZOPICLONE 3 MG/1
3 TABLET, FILM COATED ORAL NIGHTLY PRN
Qty: 90 TABLET | Refills: 0 | Status: SHIPPED | OUTPATIENT
Start: 2023-08-02 | End: 2023-10-31

## 2023-09-06 DIAGNOSIS — R10.13 EPIGASTRIC PAIN: ICD-10-CM

## 2023-09-06 RX ORDER — ESOMEPRAZOLE MAGNESIUM 40 MG/1
40 CAPSULE, DELAYED RELEASE ORAL 2 TIMES DAILY
Qty: 180 CAPSULE | Refills: 4 | OUTPATIENT
Start: 2023-09-06

## 2023-09-11 DIAGNOSIS — R10.13 EPIGASTRIC PAIN: ICD-10-CM

## 2023-09-12 RX ORDER — CONJUGATED ESTROGENS 0.3 MG/1
0.3 TABLET, FILM COATED ORAL DAILY
Qty: 90 TABLET | Refills: 4 | Status: SHIPPED | OUTPATIENT
Start: 2023-09-12

## 2023-09-12 RX ORDER — ESOMEPRAZOLE MAGNESIUM 40 MG/1
40 CAPSULE, DELAYED RELEASE ORAL 2 TIMES DAILY
Qty: 180 CAPSULE | Refills: 4 | Status: SHIPPED | OUTPATIENT
Start: 2023-09-12

## 2023-09-21 LAB
A/G RATIO: 1.9 RATIO (ref 1.1–2.6)
ALBUMIN SERPL-MCNC: 4.4 G/DL (ref 3.5–5)
ALP BLD-CCNC: 84 U/L (ref 40–120)
ALT SERPL-CCNC: 8 U/L (ref 5–40)
ANION GAP SERPL CALCULATED.3IONS-SCNC: 9 MMOL/L (ref 3–15)
AST SERPL-CCNC: 19 U/L (ref 10–37)
BILIRUB SERPL-MCNC: 0.3 MG/DL (ref 0.2–1.2)
BUN BLDV-MCNC: 16 MG/DL (ref 6–22)
CALCIUM SERPL-MCNC: 10.1 MG/DL (ref 8.4–10.5)
CHLORIDE BLD-SCNC: 96 MMOL/L (ref 98–110)
CHOLESTEROL/HDL RATIO: 2.6 (ref 0–5)
CHOLESTEROL: 287 MG/DL (ref 110–200)
CO2: 35 MMOL/L (ref 20–32)
CREAT SERPL-MCNC: 0.6 MG/DL (ref 0.8–1.4)
GLOBULIN: 2.3 G/DL (ref 2–4)
GLOMERULAR FILTRATION RATE: >60 ML/MIN/1.73 SQ.M.
GLUCOSE: 95 MG/DL (ref 70–99)
HCT VFR BLD CALC: 36.2 % (ref 35.1–48)
HDLC SERPL-MCNC: 112 MG/DL
HEMOGLOBIN: 11.8 G/DL (ref 11.7–16)
IRON % SATURATION: 29 % (ref 20–50)
IRON: 102 MCG/DL (ref 30–160)
LDL CHOLESTEROL CALCULATED: 149 MG/DL (ref 50–99)
LDL/HDL RATIO: 1.3
MCH RBC QN AUTO: 30 PG (ref 26–34)
MCHC RBC AUTO-ENTMCNC: 33 G/DL (ref 31–36)
MCV RBC AUTO: 92 FL (ref 80–99)
NON-HDL CHOLESTEROL: 175 MG/DL
PDW BLD-RTO: 14.4 % (ref 10–15.5)
PLATELET # BLD: 323 K/UL (ref 140–440)
PMV BLD AUTO: 11.4 FL (ref 9–13)
POTASSIUM SERPL-SCNC: 4 MMOL/L (ref 3.5–5.5)
RBC: 3.95 M/UL (ref 3.8–5.2)
SODIUM BLD-SCNC: 140 MMOL/L (ref 133–145)
THIAMINE BLOOD: 80 NMOL/L (ref 78–185)
TOTAL IRON BINDING CAPACITY: 353 MCG/DL (ref 228–428)
TOTAL PROTEIN: 6.7 G/DL (ref 6.2–8.1)
TRIGL SERPL-MCNC: 128 MG/DL (ref 40–149)
UIBC: 251 MCG/DL (ref 110–370)
VITAMIN B-12: 1830 PG/ML (ref 211–911)
VITAMIN D 25-HYDROXY: 47.7 NG/ML (ref 32–100)
VLDLC SERPL CALC-MCNC: 26 MG/DL (ref 8–30)
WBC: 5.6 K/UL (ref 4–11)

## 2023-10-11 ENCOUNTER — TELEMEDICINE (OUTPATIENT)
Facility: CLINIC | Age: 63
End: 2023-10-11
Payer: COMMERCIAL

## 2023-10-11 DIAGNOSIS — R10.13 EPIGASTRIC PAIN: ICD-10-CM

## 2023-10-11 DIAGNOSIS — N95.1 MENOPAUSAL SYMPTOMS: ICD-10-CM

## 2023-10-11 DIAGNOSIS — K21.9 GASTROESOPHAGEAL REFLUX DISEASE, UNSPECIFIED WHETHER ESOPHAGITIS PRESENT: ICD-10-CM

## 2023-10-11 DIAGNOSIS — E78.5 HYPERLIPIDEMIA, UNSPECIFIED HYPERLIPIDEMIA TYPE: ICD-10-CM

## 2023-10-11 DIAGNOSIS — F41.9 ANXIETY: ICD-10-CM

## 2023-10-11 DIAGNOSIS — K21.9 GASTROESOPHAGEAL REFLUX DISEASE, UNSPECIFIED WHETHER ESOPHAGITIS PRESENT: Primary | ICD-10-CM

## 2023-10-11 PROCEDURE — 99214 OFFICE O/P EST MOD 30 MIN: CPT | Performed by: FAMILY MEDICINE

## 2023-10-11 RX ORDER — FAMOTIDINE 40 MG/1
40 TABLET, FILM COATED ORAL 2 TIMES DAILY
Qty: 180 TABLET | Refills: 1 | Status: SHIPPED | OUTPATIENT
Start: 2023-10-11

## 2023-10-11 RX ORDER — EZETIMIBE 10 MG/1
10 TABLET ORAL DAILY
Qty: 90 TABLET | Refills: 1 | Status: SHIPPED | OUTPATIENT
Start: 2023-10-11

## 2023-10-11 RX ORDER — PRAVASTATIN SODIUM 20 MG
20 TABLET ORAL DAILY
Qty: 90 TABLET | Refills: 1 | Status: SHIPPED | OUTPATIENT
Start: 2023-10-11

## 2023-10-11 RX ORDER — FAMOTIDINE 40 MG/1
40 TABLET, FILM COATED ORAL 2 TIMES DAILY
Qty: 60 TABLET | Refills: 1 | Status: SHIPPED | OUTPATIENT
Start: 2023-10-11

## 2023-10-11 RX ORDER — HYDROXYZINE HYDROCHLORIDE 10 MG/1
10 TABLET, FILM COATED ORAL 2 TIMES DAILY PRN
Qty: 40 TABLET | Refills: 1 | Status: SHIPPED | OUTPATIENT
Start: 2023-10-11

## 2023-10-11 NOTE — PROGRESS NOTES
Lulu Lopez, was evaluated through a synchronous (real-time) audio-video encounter. The patient (or guardian if applicable) is aware that this is a billable service, which includes applicable co-pays. This Virtual Visit was conducted with patient's (and/or legal guardian's) consent. Patient identification was verified, and a caregiver was present when appropriate. The patient was located at Home: [address]  Provider was located at Home ([address] [facility]): [facility] (:  1960) is a Established patient, presenting virtually for evaluation of the following:  Chief Complaint   Patient presents with    Hypertension    Cholesterol Problem    Discuss Labs    vit d def    Gastroesophageal Reflux     Patient complains of indegestion  x week now  states that tums did not help  but mylanta did . Pt complains of pain right in the middle of her stomach she is prev. Gastric bypass pt . She thinks anxiety may be contibuting factor due to up coming move          Assessment & Plan   Below is the assessment and plan developed based on review of pertinent history, physical exam, labs, studies, and medications. 1. Gastroesophageal reflux disease, unspecified whether esophagitis present  Assessment & Plan:   Uncontrolled, continue current medications and add pepcid bid. pt prefers to wait and see gi after she moves to [place]  Orders:  -     famotidine (PEPCID) 40 MG tablet; Take 1 tablet by mouth 2 times daily, Disp-180 tablet, R-1Normal  -     famotidine (PEPCID) 40 MG tablet; Take 1 tablet by mouth 2 times daily, Disp-60 tablet, R-1Normal  2. Epigastric pain  Assessment & Plan:   Uncontrolled, as above, nexium bid, pepcid bid, see gi after moving to [place]. Orders:  -     famotidine (PEPCID) 40 MG tablet; Take 1 tablet by mouth 2 times daily, Disp-180 tablet, R-1Normal  -     famotidine (PEPCID) 40 MG tablet;  Take 1 tablet by mouth 2 times daily, Disp-60 tablet,

## 2023-10-12 RX ORDER — FAMOTIDINE 40 MG/1
40 TABLET, FILM COATED ORAL 2 TIMES DAILY
Qty: 180 TABLET | OUTPATIENT
Start: 2023-10-12

## 2023-10-15 DIAGNOSIS — G47.00 INSOMNIA, UNSPECIFIED TYPE: ICD-10-CM

## 2023-10-15 PROBLEM — F41.9 ANXIETY: Status: ACTIVE | Noted: 2023-10-15

## 2023-10-15 PROBLEM — R10.13 EPIGASTRIC PAIN: Status: ACTIVE | Noted: 2023-10-15

## 2023-10-15 PROBLEM — N95.1 MENOPAUSAL SYMPTOMS: Status: ACTIVE | Noted: 2023-10-15

## 2023-10-15 ASSESSMENT — ENCOUNTER SYMPTOMS
RESPIRATORY NEGATIVE: 1
ABDOMINAL PAIN: 1

## 2023-10-16 RX ORDER — ESZOPICLONE 3 MG/1
3 TABLET, FILM COATED ORAL NIGHTLY PRN
Qty: 90 TABLET | Refills: 0 | Status: SHIPPED | OUTPATIENT
Start: 2023-10-16 | End: 2024-01-14

## 2023-10-30 NOTE — TELEPHONE ENCOUNTER
Chief Complaint   Patient presents with    New Patient     new - referral from Jaja Pulliam CNP - [facility]         Vitals were taken, medications reconciled.    Kaelyn Arteaga CMA  3:21 PM     I have attempted to send it to her preferred pharmacy. They have not be accepting controlled substance prescriptions from civilian providers so I do not know if it will go through. Her last refill was sent to a civilian pharmacy for that reason.

## 2023-11-10 DIAGNOSIS — N95.9 UNSPECIFIED MENOPAUSAL AND PERIMENOPAUSAL DISORDER: ICD-10-CM

## 2023-11-10 DIAGNOSIS — E78.5 HYPERLIPIDEMIA, UNSPECIFIED HYPERLIPIDEMIA TYPE: ICD-10-CM

## 2023-11-10 DIAGNOSIS — R10.13 EPIGASTRIC PAIN: ICD-10-CM

## 2023-11-10 DIAGNOSIS — K21.9 GASTROESOPHAGEAL REFLUX DISEASE, UNSPECIFIED WHETHER ESOPHAGITIS PRESENT: ICD-10-CM

## 2023-11-10 DIAGNOSIS — F41.9 ANXIETY: ICD-10-CM

## 2023-11-10 DIAGNOSIS — G47.00 INSOMNIA, UNSPECIFIED TYPE: ICD-10-CM

## 2023-11-10 DIAGNOSIS — I10 ESSENTIAL (PRIMARY) HYPERTENSION: ICD-10-CM

## 2023-11-10 DIAGNOSIS — J30.89 ALLERGIC RHINITIS DUE TO OTHER ALLERGIC TRIGGER, UNSPECIFIED SEASONALITY: Primary | ICD-10-CM

## 2023-11-10 DIAGNOSIS — F41.9 ANXIETY DISORDER, UNSPECIFIED TYPE: ICD-10-CM

## 2023-11-10 NOTE — TELEPHONE ENCOUNTER
----- Message from Paco Adkins sent at 11/10/2023 12:03 PM EST -----  Regarding: Medication transfer  Contact: [phone]  Hi Dr Marvel Bermudez and your nurse. Hope all is well. [facility]  Thanks

## 2023-11-13 RX ORDER — VENLAFAXINE HYDROCHLORIDE 75 MG/1
75 CAPSULE, EXTENDED RELEASE ORAL DAILY
Qty: 90 CAPSULE | Refills: 0 | Status: SHIPPED | OUTPATIENT
Start: 2023-11-13

## 2023-11-13 RX ORDER — ESZOPICLONE 3 MG/1
3 TABLET, FILM COATED ORAL NIGHTLY PRN
Qty: 90 TABLET | Refills: 0 | Status: SHIPPED | OUTPATIENT
Start: 2023-11-13 | End: 2024-02-11

## 2023-11-13 RX ORDER — MONTELUKAST SODIUM 10 MG/1
10 TABLET ORAL DAILY
Qty: 90 TABLET | Refills: 0 | Status: SHIPPED | OUTPATIENT
Start: 2023-11-13

## 2023-11-13 RX ORDER — HYDROXYZINE HYDROCHLORIDE 10 MG/1
10 TABLET, FILM COATED ORAL 2 TIMES DAILY PRN
Qty: 90 TABLET | Refills: 0 | Status: SHIPPED | OUTPATIENT
Start: 2023-11-13

## 2023-11-13 RX ORDER — CONJUGATED ESTROGENS 0.3 MG/1
0.3 TABLET, FILM COATED ORAL DAILY
Qty: 90 TABLET | Refills: 0 | Status: SHIPPED | OUTPATIENT
Start: 2023-11-13

## 2023-11-13 RX ORDER — AZELASTINE 1 MG/ML
1 SPRAY, METERED NASAL 2 TIMES DAILY
Qty: 3 EACH | Refills: 0 | Status: SHIPPED | OUTPATIENT
Start: 2023-11-13

## 2023-11-13 RX ORDER — AMLODIPINE BESYLATE 10 MG/1
10 TABLET ORAL DAILY
Qty: 90 TABLET | Refills: 0 | Status: SHIPPED | OUTPATIENT
Start: 2023-11-13

## 2023-11-13 RX ORDER — EZETIMIBE 10 MG/1
10 TABLET ORAL DAILY
Qty: 90 TABLET | Refills: 0 | Status: SHIPPED | OUTPATIENT
Start: 2023-11-13

## 2023-11-13 RX ORDER — ESOMEPRAZOLE MAGNESIUM 40 MG/1
40 CAPSULE, DELAYED RELEASE ORAL 2 TIMES DAILY
Qty: 180 CAPSULE | Refills: 0 | Status: SHIPPED | OUTPATIENT
Start: 2023-11-13

## 2023-11-13 RX ORDER — FAMOTIDINE 40 MG/1
40 TABLET, FILM COATED ORAL 2 TIMES DAILY
Qty: 180 TABLET | Refills: 0 | Status: SHIPPED | OUTPATIENT
Start: 2023-11-13

## 2023-12-06 NOTE — TELEPHONE ENCOUNTER
Last Filled - 12/13/22    Last Appt -10/11/23    Next Appt -none    Labs - 9/18/23      Additional Notes:

## 2023-12-08 RX ORDER — TELMISARTAN AND HYDROCHLORTHIAZIDE 80; 25 MG/1; MG/1
1 TABLET ORAL DAILY
Qty: 90 TABLET | Refills: 4 | Status: SHIPPED | OUTPATIENT
Start: 2023-12-08

## 2023-12-11 ENCOUNTER — TELEPHONE (OUTPATIENT)
Facility: CLINIC | Age: 63
End: 2023-12-11

## 2023-12-11 NOTE — TELEPHONE ENCOUNTER
----- Message from August Nuria sent at 12/8/2023  4:54 PM EST -----  Subject: Refill Request    QUESTIONS  Name of Medication? telmisartan-hydroCHLOROthiazide (MICARDIS HCT) 80-25   MG per tablet  Patient-reported dosage and instructions? unknown  How many days do you have left? 0  Preferred Pharmacy? [facility] phone number (if available)? [phone]  Additional Information for Provider? t should get a 90 day supply  ---------------------------------------------------------------------------  --------------  CALL BACK INFO  What is the best way for the office to contact you? Do not leave any   message, patient will call back for answer  Preferred Call Back Phone Number? [phone]  ---------------------------------------------------------------------------  --------------  SCRIPT ANSWERS  Relationship to Patient? Covered Entity  Covered Entity Type? Pharmacy? Representative Name?  Josie/ Yumiko perrin

## 2024-01-03 NOTE — LETTER
NOTIFICATION RETURN TO WORK / SCHOOL    1/24/2022 1:29 PM    Ms. Ave Drake  [address]  [address]      To Whom It May Concern:    Ave Drake is currently under the care of [address] [place] [address] Eleazar Sood. She will remain on no duty at work for the next 6 weeks. If there are questions or concerns please have the patient contact our office.         Sincerely,      Tripp Johnson MD Never

## 2024-02-07 DIAGNOSIS — J30.89 ALLERGIC RHINITIS DUE TO OTHER ALLERGIC TRIGGER, UNSPECIFIED SEASONALITY: ICD-10-CM

## 2024-02-07 RX ORDER — MONTELUKAST SODIUM 10 MG/1
10 TABLET ORAL DAILY
Qty: 90 TABLET | Refills: 3 | OUTPATIENT
Start: 2024-02-07

## 2024-02-12 DIAGNOSIS — K21.9 GASTROESOPHAGEAL REFLUX DISEASE, UNSPECIFIED WHETHER ESOPHAGITIS PRESENT: ICD-10-CM

## 2024-02-12 DIAGNOSIS — F41.9 ANXIETY: ICD-10-CM

## 2024-02-12 DIAGNOSIS — I10 ESSENTIAL (PRIMARY) HYPERTENSION: ICD-10-CM

## 2024-02-12 DIAGNOSIS — J30.89 ALLERGIC RHINITIS DUE TO OTHER ALLERGIC TRIGGER, UNSPECIFIED SEASONALITY: ICD-10-CM

## 2024-02-12 DIAGNOSIS — R10.13 EPIGASTRIC PAIN: ICD-10-CM

## 2024-02-12 DIAGNOSIS — E78.5 HYPERLIPIDEMIA, UNSPECIFIED HYPERLIPIDEMIA TYPE: ICD-10-CM

## 2024-02-12 DIAGNOSIS — F41.9 ANXIETY DISORDER, UNSPECIFIED TYPE: ICD-10-CM

## 2024-02-12 NOTE — TELEPHONE ENCOUNTER
----- Message from Juliette Pedroza sent at 2/12/2024 10:33 AM EST -----  Regarding: Prescription   Contact: [phone]  Hi Dr Reeves can you pls refill my prescriptions until I can find a doctor. The Dr cotton referred me to only see private patient. Do not take any ins patients.

## 2024-02-23 RX ORDER — HYDROXYZINE HYDROCHLORIDE 10 MG/1
10 TABLET, FILM COATED ORAL 2 TIMES DAILY PRN
Qty: 30 TABLET | Refills: 0 | Status: SHIPPED | OUTPATIENT
Start: 2024-02-23

## 2024-02-23 RX ORDER — TELMISARTAN AND HYDROCHLORTHIAZIDE 80; 25 MG/1; MG/1
1 TABLET ORAL DAILY
Qty: 30 TABLET | Refills: 0 | Status: SHIPPED | OUTPATIENT
Start: 2024-02-23

## 2024-02-23 RX ORDER — AZELASTINE 1 MG/ML
1 SPRAY, METERED NASAL 2 TIMES DAILY
Qty: 1 EACH | Refills: 0 | Status: SHIPPED | OUTPATIENT
Start: 2024-02-23

## 2024-02-23 RX ORDER — FAMOTIDINE 40 MG/1
40 TABLET, FILM COATED ORAL 2 TIMES DAILY
Qty: 60 TABLET | Refills: 0 | Status: SHIPPED | OUTPATIENT
Start: 2024-02-23

## 2024-02-23 RX ORDER — EZETIMIBE 10 MG/1
10 TABLET ORAL DAILY
Qty: 30 TABLET | Refills: 0 | Status: SHIPPED | OUTPATIENT
Start: 2024-02-23

## 2024-02-23 RX ORDER — ESOMEPRAZOLE MAGNESIUM 40 MG/1
40 CAPSULE, DELAYED RELEASE ORAL 2 TIMES DAILY
Qty: 60 CAPSULE | Refills: 0 | Status: SHIPPED | OUTPATIENT
Start: 2024-02-23

## 2024-02-23 RX ORDER — VENLAFAXINE HYDROCHLORIDE 75 MG/1
75 CAPSULE, EXTENDED RELEASE ORAL DAILY
Qty: 30 CAPSULE | Refills: 0 | Status: SHIPPED | OUTPATIENT
Start: 2024-02-23

## 2024-02-23 RX ORDER — MONTELUKAST SODIUM 10 MG/1
10 TABLET ORAL DAILY
Qty: 30 TABLET | Refills: 0 | Status: SHIPPED | OUTPATIENT
Start: 2024-02-23

## 2024-02-23 RX ORDER — AMLODIPINE BESYLATE 10 MG/1
10 TABLET ORAL DAILY
Qty: 30 TABLET | Refills: 0 | Status: SHIPPED | OUTPATIENT
Start: 2024-02-23

## 2024-02-23 NOTE — TELEPHONE ENCOUNTER
Pt advised that  we can only and 30 day supply   she is still [facility] .

## 2024-02-26 DIAGNOSIS — I10 ESSENTIAL (PRIMARY) HYPERTENSION: ICD-10-CM

## 2024-02-26 RX ORDER — AMLODIPINE BESYLATE 10 MG/1
10 TABLET ORAL DAILY
Qty: 90 TABLET | Refills: 3 | OUTPATIENT
Start: 2024-02-26

## 2024-02-27 DIAGNOSIS — E78.5 HYPERLIPIDEMIA, UNSPECIFIED HYPERLIPIDEMIA TYPE: ICD-10-CM

## 2024-02-28 RX ORDER — EZETIMIBE 10 MG/1
10 TABLET ORAL DAILY
Qty: 90 TABLET | Refills: 3 | OUTPATIENT
Start: 2024-02-28

## 2024-03-01 DIAGNOSIS — N95.9 UNSPECIFIED MENOPAUSAL AND PERIMENOPAUSAL DISORDER: ICD-10-CM

## 2024-03-07 RX ORDER — CONJUGATED ESTROGENS 0.3 MG/1
0.3 TABLET, FILM COATED ORAL DAILY
Qty: 90 TABLET | Refills: 3 | OUTPATIENT
Start: 2024-03-07

## 2024-05-06 DIAGNOSIS — J30.89 ALLERGIC RHINITIS DUE TO OTHER ALLERGIC TRIGGER, UNSPECIFIED SEASONALITY: ICD-10-CM

## 2024-05-10 NOTE — TELEPHONE ENCOUNTER
Last seen 10/11/2023   Last labs   Last filled 2/23/24   Next appointment Visit date not found     Lab Results   Component Value Date     09/18/2023    K 4.0 09/18/2023    CL 96 (L) 09/18/2023    CO2 35 (H) 09/18/2023    BUN 16 09/18/2023    CREATININE 0.6 (L) 09/18/2023    GLUCOSE 95 09/18/2023    CALCIUM 10.1 09/18/2023    BILITOT 0.3 09/18/2023    ALKPHOS 84 09/18/2023    AST 19 09/18/2023    ALT 8 09/18/2023    LABGLOM >60.0 09/18/2023    GFRAA >60.0 03/19/2022    AGRATIO 1.9 09/18/2023    GLOB 2.3 09/18/2023

## 2024-05-12 RX ORDER — AZELASTINE 1 MG/ML
SPRAY, METERED NASAL
Qty: 30 ML | Refills: 6 | OUTPATIENT
Start: 2024-05-12